# Patient Record
Sex: FEMALE | Race: WHITE | NOT HISPANIC OR LATINO | Employment: UNEMPLOYED | URBAN - NONMETROPOLITAN AREA
[De-identification: names, ages, dates, MRNs, and addresses within clinical notes are randomized per-mention and may not be internally consistent; named-entity substitution may affect disease eponyms.]

---

## 2017-04-10 ENCOUNTER — OFFICE VISIT (OUTPATIENT)
Dept: OTOLARYNGOLOGY | Facility: CLINIC | Age: 37
End: 2017-04-10

## 2017-04-10 VITALS
WEIGHT: 158 LBS | TEMPERATURE: 97.8 F | DIASTOLIC BLOOD PRESSURE: 87 MMHG | HEIGHT: 62 IN | BODY MASS INDEX: 29.08 KG/M2 | SYSTOLIC BLOOD PRESSURE: 135 MMHG | HEART RATE: 78 BPM

## 2017-04-10 DIAGNOSIS — H61.21 IMPACTED CERUMEN OF RIGHT EAR: Primary | ICD-10-CM

## 2017-04-10 DIAGNOSIS — Z85.841 HISTORY OF BRAIN CANCER: ICD-10-CM

## 2017-04-10 PROCEDURE — 99213 OFFICE O/P EST LOW 20 MIN: CPT | Performed by: NURSE PRACTITIONER

## 2017-04-10 PROCEDURE — 69210 REMOVE IMPACTED EAR WAX UNI: CPT | Performed by: NURSE PRACTITIONER

## 2017-04-10 NOTE — PROGRESS NOTES
YOB: 1980  Location: [facility]  Location Address: [address] [place] [address], [facility], [address] [place] [address]  Location Phone: [phone]    Chief Complaint   Patient presents with   • Cerumen Impaction       History of Present Illness  Valarie Gomez is a 37 y.o. female.  Valarie Gomez is here for follow up of ENT complaints. The patient has had problems with cerumen accumulation  The symptoms are localized to the right ear. The patient has had moderate symptoms. The symptoms have been present for the last several months . The symptoms are aggravated by  no identifiable factors . The symptoms are improved by no identifieable factors.  Hx of brain cancer with XRT - related chronic ear problems.  She has recently had an  MRI - with a recurrence of tumor that is in in an inoperable location.  Denies any other complaints.       Past Medical History:   Diagnosis Date   • Brain cancer    • Cerumen impaction    • Depression    • Seizure    • Sensorineural hearing loss    • Subjective tinnitus        Past Surgical History:   Procedure Laterality Date   • BRAIN TUMOR EXCISION     • BREAST EXCISIONAL BIOPSY Left 2006   • BREAST EXCISIONAL BIOPSY Left 2010   • CRANIOTOMY     • TUBAL ABDOMINAL LIGATION           Current Outpatient Prescriptions:   •  Cholecalciferol (VITAMIN D3) 5000 UNITS capsule capsule, Take 5,000 Units by mouth Daily., Disp: , Rfl:   •  ferrous sulfate 325 (65 FE) MG tablet, Take 325 mg by mouth Daily With Breakfast., Disp: , Rfl:   •  folic acid (FOLVITE) 1 MG tablet, Take 1 mg by mouth Daily., Disp: , Rfl:   •  OXcarbazepine (TRILEPTAL) 300 MG tablet, TAKE 1 TABLET BY MOUTH TWICE A DAY, Disp: , Rfl: 3  •  tobramycin (TOBREX) 0.3 % solution ophthalmic solution, Administer 1 drop into the left eye Every 4 (Four) Hours., Disp: 5 mL, Rfl: 0  •  Vortioxetine HBr (TRINTELLIX) 10 MG tablet, Take  by mouth., Disp: , Rfl:     Review of patient's allergies indicates no known allergies.    Family  History   Problem Relation Age of Onset   • Breast cancer Mother    • Breast cancer Maternal Aunt        Social History     Social History   • Marital status:      Spouse name: N/A   • Number of children: N/A   • Years of education: N/A     Occupational History   • Not on file.     Social History Main Topics   • Smoking status: Never Smoker   • Smokeless tobacco: Not on file   • Alcohol use No   • Drug use: Defer   • Sexual activity: Not on file     Other Topics Concern   • Not on file     Social History Narrative       Review of Systems   Constitutional: Negative.    HENT:        See hpi   Eyes: Negative.    Respiratory: Negative.    Cardiovascular: Negative.    Gastrointestinal: Negative.    Endocrine: Negative.    Genitourinary: Negative.    Musculoskeletal: Negative.    Skin: Negative.    Allergic/Immunologic: Negative.    Neurological: Negative.    Hematological: Negative.    Psychiatric/Behavioral: Negative.        Vitals:    04/10/17 1305   BP: 135/87   Pulse: 78   Temp: 97.8 °F (36.6 °C)       Objective     Physical Exam  Physical Exam     CONSTITUTIONAL: well nourished, alert, oriented, in no acute distress     COMMUNICATION AND VOICE: able to communicate normally, normal voice quality    HEAD: normocephalic, no lesions, atraumatic, no tenderness, no masses     FACE: appearance normal, no lesions, no tenderness, no deformities, facial motion symmetric    SALIVARY GLANDS: parotid glands with no tenderness, no swelling, no masses, submandibular glands with normal size, nontender    EYES: ocular motility normal, eyelids normal, orbits normal, no proptosis, conjunctiva normal , pupils equal, round     EARS:  Hearing: response to conversational voice normal bilaterally   External Ears: auricles without lesions  Otoscopic: tympanic membrane appearance normal, no lesions, no perforation, normal mobility, no fluid, right TM with adhered cerumen removed with suction    NOSE:  External Nose: structure normal,  no tenderness on palpation, no nasal discharge, no lesions, no evidence of trauma, nostrils patent         ORAL:  Lips: upper and lower lips without lesion   HYPOPHARYNX:   LARYNX: deferred    LYMPH NODES: no lymphadenopathy    CHEST/RESPIRATORY: respiratory effort normal,  CARDIOVASCULAR:  extremities without cyanosis or edema      NEUROLOGIC/PSYCHIATRIC: oriented to time, place and person, mood normal, affect appropriate, CN II-XII intact grossly    Assessment/Plan   Valarie was seen today for cerumen impaction.    Diagnoses and all orders for this visit:    Impacted cerumen of right ear    History of brain cancer      * Surgery not found *  No orders of the defined types were placed in this encounter.    Return in about 6 months (around 10/10/2017).       Patient Instructions   Cerumen was removed

## 2017-04-10 NOTE — PROGRESS NOTES
Procedure Note    Preprocedure Diagnosis:  Cerumen Impaction    Postprocedure Diagnosis:  Cerumen Impaction      PROCEDURE NOTE    PROCEDURE:cerumen removal .     ANESTHESIA:None     REASON FOR THE PROCEDURE:The patient presented with cerumen impaction .   The patient verbally consented to intervention after a full discussion of risks, benefits, and alternatives. No guarantees were made or implied.    DETAILS OF THE PROCEDURE:The patient was seated upright in the exam room chair. The open head otoscope was used to visualize the ear canal and tympanic membrane. Cerumen was then removed from the right ear/TM using a suction

## 2017-06-27 RX ORDER — ALPRAZOLAM 0.25 MG/1
0.25 TABLET ORAL 2 TIMES DAILY PRN
COMMUNITY
End: 2020-10-13 | Stop reason: ALTCHOICE

## 2017-06-27 RX ORDER — FERROUS SULFATE 325(65) MG
325 TABLET ORAL
COMMUNITY

## 2017-06-27 RX ORDER — FOLIC ACID 1 MG/1
1 TABLET ORAL DAILY
COMMUNITY
End: 2019-03-21

## 2017-06-27 RX ORDER — OXCARBAZEPINE 300 MG/1
300 TABLET, FILM COATED ORAL
COMMUNITY

## 2017-06-28 ENCOUNTER — OFFICE VISIT (OUTPATIENT)
Dept: INTERNAL MEDICINE | Age: 37
End: 2017-06-28
Payer: COMMERCIAL

## 2017-06-28 VITALS
WEIGHT: 162 LBS | OXYGEN SATURATION: 98 % | BODY MASS INDEX: 31.8 KG/M2 | HEART RATE: 80 BPM | HEIGHT: 60 IN | DIASTOLIC BLOOD PRESSURE: 68 MMHG | SYSTOLIC BLOOD PRESSURE: 100 MMHG

## 2017-06-28 DIAGNOSIS — G40.909 SEIZURE DISORDER (HCC): ICD-10-CM

## 2017-06-28 DIAGNOSIS — C71.9 ANAPLASTIC GLIOMA OF BRAIN (HCC): ICD-10-CM

## 2017-06-28 DIAGNOSIS — F41.9 ANXIETY: ICD-10-CM

## 2017-06-28 DIAGNOSIS — F34.1 DYSTHYMIA: ICD-10-CM

## 2017-06-28 PROCEDURE — 99213 OFFICE O/P EST LOW 20 MIN: CPT | Performed by: INTERNAL MEDICINE

## 2017-06-28 ASSESSMENT — ENCOUNTER SYMPTOMS
ABDOMINAL PAIN: 0
VOMITING: 0
RHINORRHEA: 0
NAUSEA: 0
EYE REDNESS: 0
SINUS PRESSURE: 0
DIARRHEA: 0
CONSTIPATION: 0
COUGH: 0
SHORTNESS OF BREATH: 0

## 2017-07-13 ENCOUNTER — TELEPHONE (OUTPATIENT)
Dept: INTERNAL MEDICINE | Age: 37
End: 2017-07-13

## 2017-08-29 ENCOUNTER — OFFICE VISIT (OUTPATIENT)
Dept: INTERNAL MEDICINE | Age: 37
End: 2017-08-29
Payer: COMMERCIAL

## 2017-08-29 VITALS
OXYGEN SATURATION: 96 % | BODY MASS INDEX: 32.22 KG/M2 | DIASTOLIC BLOOD PRESSURE: 68 MMHG | HEART RATE: 80 BPM | SYSTOLIC BLOOD PRESSURE: 104 MMHG | WEIGHT: 165 LBS

## 2017-08-29 DIAGNOSIS — F41.9 ANXIETY: ICD-10-CM

## 2017-08-29 DIAGNOSIS — F34.1 DYSTHYMIA: Primary | ICD-10-CM

## 2017-08-29 DIAGNOSIS — G40.909 SEIZURE DISORDER (HCC): ICD-10-CM

## 2017-08-29 DIAGNOSIS — C71.9 ANAPLASTIC GLIOMA OF BRAIN (HCC): ICD-10-CM

## 2017-08-29 PROCEDURE — 99213 OFFICE O/P EST LOW 20 MIN: CPT | Performed by: INTERNAL MEDICINE

## 2017-09-04 ASSESSMENT — ENCOUNTER SYMPTOMS: SHORTNESS OF BREATH: 0

## 2017-09-11 ENCOUNTER — OFFICE VISIT (OUTPATIENT)
Dept: OTOLARYNGOLOGY | Facility: CLINIC | Age: 37
End: 2017-09-11

## 2017-09-11 VITALS
HEART RATE: 75 BPM | WEIGHT: 166 LBS | SYSTOLIC BLOOD PRESSURE: 118 MMHG | HEIGHT: 60 IN | DIASTOLIC BLOOD PRESSURE: 89 MMHG | BODY MASS INDEX: 32.59 KG/M2 | TEMPERATURE: 97.9 F

## 2017-09-11 DIAGNOSIS — Z85.841 HISTORY OF BRAIN CANCER: ICD-10-CM

## 2017-09-11 DIAGNOSIS — H61.21 IMPACTED CERUMEN OF RIGHT EAR: Primary | ICD-10-CM

## 2017-09-11 PROCEDURE — 99213 OFFICE O/P EST LOW 20 MIN: CPT | Performed by: NURSE PRACTITIONER

## 2017-09-11 RX ORDER — ALPRAZOLAM 0.25 MG/1
TABLET ORAL
COMMUNITY

## 2017-09-11 NOTE — PROGRESS NOTES
YOB: 1980  Location: [facility]  Location Address: [address] [place] [address], [facility] [address], [address] [place] [address]  Location Phone: [phone]    Chief Complaint   Patient presents with   • Cerumen Impaction       History of Present Illness  Valarie Gomez is a 37 y.o. female.  Valarie Gomez is here for follow up of ENT complaints. The patient has had problems with cerumen accumulation  The symptoms are localized to the right ear. The patient has had moderate symptoms. The symptoms have been present for the last several months . The symptoms are aggravated by  no identifiable factors . The symptoms are improved by no identifieable factors.  Hx of brain cancer with XRT - related chronic ear problems.  She has recently had an  MRI - with a recurrence of tumor that is in in an inoperable location however suspect its radiation effects.  Denies any other complaints.  Father passed away two years ago       Past Medical History:   Diagnosis Date   • Brain cancer    • Cerumen impaction    • Depression    • Seizure    • Sensorineural hearing loss    • Subjective tinnitus        Past Surgical History:   Procedure Laterality Date   • BRAIN TUMOR EXCISION     • BREAST EXCISIONAL BIOPSY Left 2006   • BREAST EXCISIONAL BIOPSY Left 2010   • CRANIOTOMY     • TUBAL ABDOMINAL LIGATION           Current Outpatient Prescriptions:   •  ALPRAZolam (XANAX) 0.25 MG tablet, Take  by mouth., Disp: , Rfl:   •  Cholecalciferol (VITAMIN D3) 5000 UNITS capsule capsule, Take 5,000 Units by mouth Daily., Disp: , Rfl:   •  ferrous sulfate 325 (65 FE) MG tablet, Take 325 mg by mouth Daily With Breakfast., Disp: , Rfl:   •  folic acid (FOLVITE) 1 MG tablet, Take 1 mg by mouth Daily., Disp: , Rfl:   •  OXcarbazepine (TRILEPTAL) 300 MG tablet, TAKE 1 TABLET BY MOUTH TWICE A DAY, Disp: , Rfl: 3  •  tobramycin (TOBREX) 0.3 % solution ophthalmic solution, Administer 1 drop into the left eye Every 4 (Four) Hours., Disp: 5 mL, Rfl: 0  •   Vortioxetine HBr (TRINTELLIX) 10 MG tablet, Take  by mouth., Disp: , Rfl:     Review of patient's allergies indicates no known allergies.    Family History   Problem Relation Age of Onset   • Breast cancer Mother    • Breast cancer Maternal Aunt        Social History     Social History   • Marital status:      Spouse name: N/A   • Number of children: N/A   • Years of education: N/A     Occupational History   • Not on file.     Social History Main Topics   • Smoking status: Never Smoker   • Smokeless tobacco: Not on file   • Alcohol use No   • Drug use: Defer   • Sexual activity: Not on file     Other Topics Concern   • Not on file     Social History Narrative       Review of Systems   Constitutional: Negative.    HENT:        See hpi   Eyes: Negative.    Respiratory: Negative.    Cardiovascular: Negative.    Gastrointestinal: Negative.    Endocrine: Negative.    Genitourinary: Negative.    Musculoskeletal: Negative.    Skin: Negative.    Allergic/Immunologic: Negative.    Neurological: Negative.    Hematological: Negative.    Psychiatric/Behavioral: Negative.        Vitals:    09/11/17 1106   BP: 118/89   Pulse: 75   Temp: 97.9 °F (36.6 °C)       Objective     Physical Exam  Physical Exam     CONSTITUTIONAL: well nourished, alert, oriented, in no acute distress     COMMUNICATION AND VOICE: able to communicate normally, normal voice quality    HEAD: normocephalic, no lesions, atraumatic, no tenderness, no masses     FACE: appearance normal, no lesions, no tenderness, no deformities, facial motion symmetric    SALIVARY GLANDS: parotid glands with no tenderness, no swelling, no masses, submandibular glands with normal size, nontender    EYES: ocular motility normal, eyelids normal, orbits normal, no proptosis, conjunctiva normal , pupils equal, round     EARS:  Hearing: response to conversational voice normal bilaterally   External Ears: auricles without lesions  Otoscopic: tympanic membrane appearance normal,  no lesions, no perforation, normal mobility, no fluid, right TM with adhered cerumen removed with suction    NOSE:  External Nose: structure normal, no tenderness on palpation, no nasal discharge, no lesions, no evidence of trauma, nostrils patent       ORAL:  Lips: upper and lower lips without lesion   HYPOPHARYNX:   LARYNX: deferred    LYMPH NODES: no lymphadenopathy    CHEST/RESPIRATORY: respiratory effort normal,  CARDIOVASCULAR:  extremities without cyanosis or edema      NEUROLOGIC/PSYCHIATRIC: oriented to time, place and person, mood normal, affect appropriate, CN II-XII intact grossly    Assessment/Plan   Valarie was seen today for cerumen impaction.    Diagnoses and all orders for this visit:    Impacted cerumen of right ear    History of brain cancer      * Surgery not found *  No orders of the defined types were placed in this encounter.    Return in about 4 months (around 1/11/2018).       Patient Instructions   Cerumen was removed    Call for problems or worsening symptoms

## 2017-10-12 ENCOUNTER — TRANSCRIBE ORDERS (OUTPATIENT)
Dept: ADMINISTRATIVE | Facility: HOSPITAL | Age: 37
End: 2017-10-12

## 2017-10-12 DIAGNOSIS — Z12.39 SCREENING FOR BREAST CANCER: ICD-10-CM

## 2017-10-12 DIAGNOSIS — Z12.31 ENCOUNTER FOR SCREENING MAMMOGRAM FOR MALIGNANT NEOPLASM OF BREAST: Primary | ICD-10-CM

## 2017-10-19 ENCOUNTER — TELEPHONE (OUTPATIENT)
Dept: INTERNAL MEDICINE | Age: 37
End: 2017-10-19

## 2017-10-19 DIAGNOSIS — B37.9 YEAST INFECTION: ICD-10-CM

## 2017-10-19 RX ORDER — FLUCONAZOLE 200 MG/1
TABLET ORAL
Qty: 3 TABLET | Refills: 0 | Status: SHIPPED | OUTPATIENT
Start: 2017-10-19 | End: 2018-03-01 | Stop reason: ALTCHOICE

## 2017-10-19 NOTE — TELEPHONE ENCOUNTER
She has a yeast infection and needs medicine sent to [facility]. She is concerned that she is getting these frequently either after her period or sex.

## 2017-10-19 NOTE — TELEPHONE ENCOUNTER
Diflucan sent to pharmacy  And some nystatin/ triamcinolone cream   Try eating yogurt or taking probiotics- might help prevent

## 2017-11-27 ENCOUNTER — TELEPHONE (OUTPATIENT)
Dept: INTERNAL MEDICINE | Age: 37
End: 2017-11-27

## 2017-11-27 ENCOUNTER — HOSPITAL ENCOUNTER (EMERGENCY)
Age: 37
Discharge: HOME | End: 2017-11-27
Attending: EMERGENCY MEDICINE
Payer: COMMERCIAL

## 2017-11-27 ENCOUNTER — APPOINTMENT (OUTPATIENT)
Dept: CT IMAGING | Age: 37
End: 2017-11-27
Payer: COMMERCIAL

## 2017-11-27 VITALS
DIASTOLIC BLOOD PRESSURE: 87 MMHG | TEMPERATURE: 98.4 F | OXYGEN SATURATION: 98 % | HEART RATE: 81 BPM | SYSTOLIC BLOOD PRESSURE: 119 MMHG | RESPIRATION RATE: 18 BRPM

## 2017-11-27 DIAGNOSIS — R93.0 ABNORMAL HEAD CT: ICD-10-CM

## 2017-11-27 DIAGNOSIS — G45.9 TRANSIENT CEREBRAL ISCHEMIA, UNSPECIFIED TYPE: Primary | ICD-10-CM

## 2017-11-27 LAB
BACTERIA: NEGATIVE /HPF
BASOPHILS ABSOLUTE: 0.1 K/UL (ref 0–0.2)
BASOPHILS RELATIVE PERCENT: 0.5 % (ref 0–1)
BILIRUBIN URINE: NEGATIVE
BLOOD, URINE: ABNORMAL
CLARITY: CLEAR
COLOR: YELLOW
EOSINOPHILS ABSOLUTE: 0.1 K/UL (ref 0–0.6)
EOSINOPHILS RELATIVE PERCENT: 1 % (ref 0–5)
EPITHELIAL CELLS, UA: 4 /HPF (ref 0–5)
GLUCOSE URINE: NEGATIVE MG/DL
HCT VFR BLD CALC: 41.3 % (ref 37–47)
HEMOGLOBIN: 13.3 G/DL (ref 12–16)
HYALINE CASTS: 0 /HPF (ref 0–8)
KETONES, URINE: NEGATIVE MG/DL
LEUKOCYTE ESTERASE, URINE: ABNORMAL
LYMPHOCYTES ABSOLUTE: 2.6 K/UL (ref 1.1–4.5)
LYMPHOCYTES RELATIVE PERCENT: 24.5 % (ref 20–40)
MCH RBC QN AUTO: 29.9 PG (ref 27–31)
MCHC RBC AUTO-ENTMCNC: 32.2 G/DL (ref 33–37)
MCV RBC AUTO: 92.8 FL (ref 81–99)
MONOCYTES ABSOLUTE: 0.4 K/UL (ref 0–0.9)
MONOCYTES RELATIVE PERCENT: 4.1 % (ref 0–10)
NEUTROPHILS ABSOLUTE: 7.5 K/UL (ref 1.5–7.5)
NEUTROPHILS RELATIVE PERCENT: 69.6 % (ref 50–65)
NITRITE, URINE: NEGATIVE
PDW BLD-RTO: 13 % (ref 11.5–14.5)
PH UA: 5.5
PLATELET # BLD: 285 K/UL (ref 130–400)
PMV BLD AUTO: 10.8 FL (ref 9.4–12.3)
PROTEIN UA: NEGATIVE MG/DL
RBC # BLD: 4.45 M/UL (ref 4.2–5.4)
RBC UA: 7 /HPF (ref 0–4)
SPECIFIC GRAVITY UA: 1.02
UROBILINOGEN, URINE: 0.2 E.U./DL
WBC # BLD: 10.8 K/UL (ref 4.8–10.8)
WBC UA: 4 /HPF (ref 0–5)

## 2017-11-27 PROCEDURE — 81001 URINALYSIS AUTO W/SCOPE: CPT

## 2017-11-27 PROCEDURE — 85025 COMPLETE CBC W/AUTO DIFF WBC: CPT

## 2017-11-27 PROCEDURE — 99284 EMERGENCY DEPT VISIT MOD MDM: CPT | Performed by: EMERGENCY MEDICINE

## 2017-11-27 PROCEDURE — 70450 CT HEAD/BRAIN W/O DYE: CPT

## 2017-11-27 PROCEDURE — 99285 EMERGENCY DEPT VISIT HI MDM: CPT

## 2017-11-27 PROCEDURE — 87086 URINE CULTURE/COLONY COUNT: CPT

## 2017-11-27 PROCEDURE — 36415 COLL VENOUS BLD VENIPUNCTURE: CPT

## 2017-11-27 ASSESSMENT — ENCOUNTER SYMPTOMS
BACK PAIN: 0
SHORTNESS OF BREATH: 0
ABDOMINAL PAIN: 0

## 2017-11-27 NOTE — TELEPHONE ENCOUNTER
Her  called and said they were fixing to leave to go to [facility] for some scheduled MRI's and Lila Lynch started having slurred speech and weakness on left side. He is taking her to [facility] ER.

## 2017-11-27 NOTE — ED PROVIDER NOTES
[facility]  eMERGENCY dEPARTMENT eNCOUnter      Pt Name: Carola Gunn  MRN: [id number]  [place] 1980  Date of evaluation: 11/27/2017  Provider: Eliud Lewis MD    CHIEF COMPLAINT       Chief Complaint   Patient presents with    Aphasia     Patient had slurred speech that began while en route to [facility]. Symptoms began at 1430. Left sided weakness, unable to  objects         HISTORY OF PRESENT ILLNESS   (Location/Symptom, Timing/Onset, Context/Setting, Quality, Duration, Modifying Factors, Severity)  Note limiting factors. Carola Gunn is a 44 y.o. female who presents to the emergency department with slurred speech and left arm weakness that lasted about 15 minutes. She was riding in a car with her . They were having a conversation when she couldn't get her words out. The patient can move her jaw correctly either. 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 She is follow with Dr. Davon Lowe at [facility]. The patient is back to normal now. This happened about 30 minutes prior to arrival.     The patient was on her way to [facility] to have scheduled MRI done tomorrow. The patient has had seizures in the past only once or twice. She had an episode similar to this [facility] in 2013 follow-up likely to be may be more a partial seizure. The patient does take Trileptal for these. She doesn't really have seizures often at all. The patient otherwise denies any acute headache she has no weakness she speaking normally she is at her normal baseline at this time. She has frequent MRIs of her brain to follow with the abnormal right temporal lobe of her brain. The patient denies any recent illness or fevers. The history is provided by the patient and the spouse. Nursing Notes were reviewed. REVIEW OF SYSTEMS    (2-9 systems for level 4, 10 or more for level 5)     Review of Systems   Constitutional: Negative for fever. Respiratory: Negative for shortness of breath. Gastrointestinal: Negative for abdominal pain. Genitourinary: Negative for dysuria. Musculoskeletal: Negative for back pain. Neurological: Positive for speech difficulty and weakness. Negative for syncope and headaches. Psychiatric/Behavioral: Negative for confusion. A complete review of systems was performed and is negative except as noted above in the HPI. PAST MEDICAL HISTORY     Past Medical History:   Diagnosis Date    [facility]     Anxiety 6/28/2017    Dysthymia 6/28/2017    [facility] 6/28/2017         SURGICAL HISTORY       Past Surgical History:   Procedure Laterality Date    CRANIOTOMY           CURRENT MEDICATIONS       Discharge Medication List as of 11/27/2017  4:22 PM      CONTINUE these medications which have NOT CHANGED    Details   VORTIoxetine (TRINTELLIX) 10 MG TABS tablet Take 10 mg by mouth daily, Disp-90 tablet, R-3Next time this is due give a 3 month supply if coveredNormal      ALPRAZolam (XANAX) 0.25 MG tablet Take 0.25 mg by mouth 2 times daily as needed for SleepHistorical Med      folic acid (FOLVITE) 1 MG tablet Take 1 mg by mouth dailyHistorical Med      ferrous sulfate 325 (65 Fe) MG tablet Take 325 mg by mouth daily (with breakfast)Historical Med      OXcarbazepine (TRILEPTAL) 300 MG tablet Take 300 mg by mouth 2 times dailyHistorical Med      Cholecalciferol (VITAMIN D3) 5000 units TABS Take by mouth Indications: TAke 1 tab dailyHistorical Med      fluconazole (DIFLUCAN) 200 MG tablet Take 1 po times 1 and repeat weekly if not better, Disp-3 tablet, R-0Normal      nystatin-triamcinolone (MYCOLOG II) 331076-3.1 UNIT/GM-% cream Apply topically 2 times daily prn itching, Disp-30 g, R-0, Normal             ALLERGIES     Review of patient's allergies indicates no known allergies.     FAMILY HISTORY       Family History   Problem Relation Age of Onset    Hypertension Mother    [facility] Esophageal Cancer Father     Breast Cancer Other     Ovarian Cancer Other           SOCIAL HISTORY       Social History     Social History    Marital status:      Spouse name: N/A    Number of children: N/A    Years of education: N/A     Social History Main Topics    Smoking status: Never Smoker    Smokeless tobacco: Never Used    Alcohol use None    Drug use: No    Sexual activity: Not Asked     Other Topics Concern    None     Social History Narrative    None       SCREENINGS   NIH Stroke Scale  NIH Stroke Scale Assessed: Yes  Level of Consciousness (1a. ): Alert  LOC Questions (1b. ): Answers both correctly  LOC Commands (1c. ): Obeys both correctly  Best Gaze (2. ): Normal  Visual (3. ): No visual loss  Facial Palsy (4. ): Normal  Motor Arm, Left (5a. ): No drift  Motor Arm, Right (5b. ): No drift  Motor Leg, Left (6a. ): No drift  Motor Leg, Right (6b. ): No driftGlasgow Coma Scale  Eye Opening: Spontaneous  Best Verbal Response: Oriented  Best Motor Response: Obeys commands  Elmore Coma Scale Score: 15        PHYSICAL EXAM    (up to 7 for level 4, 8 or more for level 5)     ED Triage Vitals [11/27/17 1451]   BP Temp Temp Source Pulse Resp SpO2 Height Weight   119/87 98.4 °F (36.9 °C) Oral 86 16 99 % -- --       Physical Exam   Constitutional: She is oriented to person, place, and time. She appears well-developed and well-nourished. No distress. HENT:   Head: Normocephalic. Prior craniotomy scar   Eyes: EOM are normal. Pupils are equal, round, and reactive to light. Neck: Normal range of motion. Neck supple. Cardiovascular: Normal rate, regular rhythm and normal heart sounds. Pulmonary/Chest: Effort normal and breath sounds normal.   Abdominal: Soft. Bowel sounds are normal. She exhibits no distension. There is no tenderness. Musculoskeletal: Normal range of motion. She exhibits no edema. Neurological: She is alert and oriented to person, place, and time.  No cranial nerve deficit or sensory deficit. She exhibits normal muscle tone. Coordination and gait normal. GCS eye subscore is 4. GCS verbal subscore is 5. GCS motor subscore is 6. Skin: Skin is warm and dry. She is not diaphoretic. Psychiatric: She has a normal mood and affect. Her behavior is normal.   Nursing note and vitals reviewed. DIAGNOSTIC RESULTS     EKG: All EKG's are interpreted by the Emergency Department Physician who either signs or Co-signs this chart in the absence of a cardiologist.      RADIOLOGY:   Non-plain film images such as CT, Ultrasound and MRI are read by the radiologist. Plain radiographic images are visualized and preliminarily interpreted by the emergency physician with the below findings:        Interpretation per the Radiologist below, if available at the time of this note:    CT Head WO Contrast   Final Result   1. Prior craniotomy on the right side with encephalomalacia in the   right temporal fossa that appears stable. 2. Vague small area of low density in the right frontal   periventricular white matter is nonspecific. Acute infarct is in the   differential. Tumor recurrence would be in the differential. Follow-up   MRI without and with contrast is recommended. The full report of this exam was immediately signed and available to   the emergency room. The patient is currently in the emergency room. Signed by Dr Irma Valenzuela on 11/27/2017 4:05 PM            ED BEDSIDE ULTRASOUND:   Performed by ED Physician - none    LABS:  Labs Reviewed   CBC WITH AUTO DIFFERENTIAL - Abnormal; Notable for the following:        Result Value    MCHC 32.2 (*)     Neutrophils % 69.6 (*)     All other components within normal limits    Narrative:     Hemolyzed. Notified Dalila/RN/ER at [phone], ER to collect. URINALYSIS - Abnormal; Notable for the following:     Blood, Urine TRACE (*)     Leukocyte Esterase, Urine SMALL (*)     All other components within normal limits    Narrative:     Hemolyzed.

## 2017-11-29 LAB — URINE CULTURE, ROUTINE: NORMAL

## 2017-12-12 ENCOUNTER — OFFICE VISIT (OUTPATIENT)
Dept: OTOLARYNGOLOGY | Facility: CLINIC | Age: 37
End: 2017-12-12

## 2017-12-12 ENCOUNTER — HOSPITAL ENCOUNTER (OUTPATIENT)
Dept: MAMMOGRAPHY | Facility: HOSPITAL | Age: 37
Discharge: HOME | End: 2017-12-12
Admitting: INTERNAL MEDICINE

## 2017-12-12 VITALS
TEMPERATURE: 97.8 F | DIASTOLIC BLOOD PRESSURE: 85 MMHG | BODY MASS INDEX: 33.23 KG/M2 | HEART RATE: 68 BPM | HEIGHT: 60 IN | SYSTOLIC BLOOD PRESSURE: 123 MMHG | WEIGHT: 169.25 LBS

## 2017-12-12 DIAGNOSIS — H61.21 IMPACTED CERUMEN OF RIGHT EAR: ICD-10-CM

## 2017-12-12 DIAGNOSIS — Z12.31 ENCOUNTER FOR SCREENING MAMMOGRAM FOR MALIGNANT NEOPLASM OF BREAST: ICD-10-CM

## 2017-12-12 PROCEDURE — G0202 SCR MAMMO BI INCL CAD: HCPCS

## 2017-12-12 PROCEDURE — 69210 REMOVE IMPACTED EAR WAX UNI: CPT | Performed by: NURSE PRACTITIONER

## 2017-12-12 PROCEDURE — 77063 BREAST TOMOSYNTHESIS BI: CPT

## 2017-12-12 NOTE — PROGRESS NOTES
Procedure Note    Preprocedure Diagnosis:  Cerumen Impaction    Postprocedure Diagnosis:  Cerumen Impaction      PROCEDURE NOTE    PROCEDURE:cerumen removal .     ANESTHESIA:None     REASON FOR THE PROCEDURE:The patient presented with cerumen impaction .   The patient verbally consented to intervention after a full discussion of risks, benefits, and alternatives. No guarantees were made or implied.    DETAILS OF THE PROCEDURE:The patient was seated upright in the exam room chair. The open head otoscope was used to visualize the ear canal and tympanic membrane. Cerumen was then removed from the right TM/EAC with suction/forceps

## 2017-12-13 ENCOUNTER — APPOINTMENT (OUTPATIENT)
Dept: MAMMOGRAPHY | Facility: HOSPITAL | Age: 37
End: 2017-12-13

## 2018-02-22 DIAGNOSIS — G40.909 SEIZURE DISORDER (HCC): ICD-10-CM

## 2018-02-22 LAB
ALBUMIN SERPL-MCNC: 4.1 G/DL (ref 3.5–5.2)
ALP BLD-CCNC: 89 U/L (ref 35–104)
ALT SERPL-CCNC: 15 U/L (ref 5–33)
ANION GAP SERPL CALCULATED.3IONS-SCNC: 13 MMOL/L (ref 7–19)
AST SERPL-CCNC: 15 U/L (ref 5–32)
BILIRUB SERPL-MCNC: <0.2 MG/DL (ref 0.2–1.2)
BUN BLDV-MCNC: 8 MG/DL (ref 6–20)
CALCIUM SERPL-MCNC: 9.2 MG/DL (ref 8.6–10)
CHLORIDE BLD-SCNC: 96 MMOL/L (ref 98–111)
CHOLESTEROL, TOTAL: 151 MG/DL (ref 160–199)
CO2: 27 MMOL/L (ref 22–29)
CREAT SERPL-MCNC: 0.6 MG/DL (ref 0.5–0.9)
GFR NON-AFRICAN AMERICAN: >60
GLUCOSE BLD-MCNC: 90 MG/DL (ref 74–109)
HCT VFR BLD CALC: 35.3 % (ref 37–47)
HDLC SERPL-MCNC: 55 MG/DL (ref 65–121)
HEMOGLOBIN: 11.5 G/DL (ref 12–16)
LDL CHOLESTEROL CALCULATED: 83 MG/DL
MCH RBC QN AUTO: 30.1 PG (ref 27–31)
MCHC RBC AUTO-ENTMCNC: 32.6 G/DL (ref 33–37)
MCV RBC AUTO: 92.4 FL (ref 81–99)
PDW BLD-RTO: 12.8 % (ref 11.5–14.5)
PLATELET # BLD: 233 K/UL (ref 130–400)
PMV BLD AUTO: 10.8 FL (ref 9.4–12.3)
POTASSIUM SERPL-SCNC: 4.6 MMOL/L (ref 3.5–5)
RBC # BLD: 3.82 M/UL (ref 4.2–5.4)
SODIUM BLD-SCNC: 136 MMOL/L (ref 136–145)
T4 FREE: 0.7 NG/DL (ref 0.9–1.7)
TOTAL PROTEIN: 7.2 G/DL (ref 6.6–8.7)
TRIGL SERPL-MCNC: 66 MG/DL (ref 0–149)
TSH SERPL DL<=0.05 MIU/L-ACNC: 2.65 UIU/ML (ref 0.27–4.2)
WBC # BLD: 6.1 K/UL (ref 4.8–10.8)

## 2018-02-23 ENCOUNTER — TELEPHONE (OUTPATIENT)
Dept: INTERNAL MEDICINE | Age: 38
End: 2018-02-23

## 2018-02-23 DIAGNOSIS — J06.9 UPPER RESPIRATORY TRACT INFECTION, UNSPECIFIED TYPE: ICD-10-CM

## 2018-02-23 RX ORDER — AZITHROMYCIN 250 MG/1
TABLET, FILM COATED ORAL
Qty: 1 PACKET | Refills: 0 | Status: SHIPPED | OUTPATIENT
Start: 2018-02-23 | End: 2018-03-01 | Stop reason: ALTCHOICE

## 2018-02-23 RX ORDER — FLUCONAZOLE 200 MG/1
200 TABLET ORAL DAILY
Qty: 3 TABLET | Refills: 0 | Status: SHIPPED | OUTPATIENT
Start: 2018-02-23 | End: 2018-02-26

## 2018-03-01 ENCOUNTER — OFFICE VISIT (OUTPATIENT)
Dept: INTERNAL MEDICINE | Age: 38
End: 2018-03-01
Payer: COMMERCIAL

## 2018-03-01 ENCOUNTER — HOSPITAL ENCOUNTER (OUTPATIENT)
Age: 38
Setting detail: SPECIMEN
Discharge: HOME | End: 2018-03-01
Payer: COMMERCIAL

## 2018-03-01 VITALS
HEART RATE: 68 BPM | SYSTOLIC BLOOD PRESSURE: 100 MMHG | WEIGHT: 177 LBS | DIASTOLIC BLOOD PRESSURE: 70 MMHG | BODY MASS INDEX: 34.57 KG/M2

## 2018-03-01 DIAGNOSIS — F34.1 DYSTHYMIA: ICD-10-CM

## 2018-03-01 DIAGNOSIS — D64.9 ANEMIA, UNSPECIFIED TYPE: ICD-10-CM

## 2018-03-01 DIAGNOSIS — Z00.00 ANNUAL PHYSICAL EXAM: Primary | ICD-10-CM

## 2018-03-01 DIAGNOSIS — Z12.4 PAP SMEAR FOR CERVICAL CANCER SCREENING: ICD-10-CM

## 2018-03-01 DIAGNOSIS — C71.9 ANAPLASTIC GLIOMA OF BRAIN (HCC): ICD-10-CM

## 2018-03-01 DIAGNOSIS — G40.909 SEIZURE DISORDER (HCC): ICD-10-CM

## 2018-03-01 PROCEDURE — 99395 PREV VISIT EST AGE 18-39: CPT | Performed by: INTERNAL MEDICINE

## 2018-03-01 PROCEDURE — 88142 CYTOPATH C/V THIN LAYER: CPT

## 2018-03-01 PROCEDURE — 87624 HPV HI-RISK TYP POOLED RSLT: CPT

## 2018-03-01 ASSESSMENT — ENCOUNTER SYMPTOMS
COUGH: 0
ABDOMINAL DISTENTION: 0
ABDOMINAL PAIN: 0
BACK PAIN: 0
SINUS PRESSURE: 0
EYE REDNESS: 0
EYE DISCHARGE: 0
SHORTNESS OF BREATH: 0

## 2018-03-01 ASSESSMENT — PATIENT HEALTH QUESTIONNAIRE - PHQ9
1. LITTLE INTEREST OR PLEASURE IN DOING THINGS: 0
2. FEELING DOWN, DEPRESSED OR HOPELESS: 0
SUM OF ALL RESPONSES TO PHQ9 QUESTIONS 1 & 2: 0
SUM OF ALL RESPONSES TO PHQ QUESTIONS 1-9: 0

## 2018-03-01 NOTE — PROGRESS NOTES
by mouth 2 times daily as needed for Sleep      folic acid (FOLVITE) 1 MG tablet Take 1 mg by mouth daily      ferrous sulfate 325 (65 Fe) MG tablet Take 325 mg by mouth daily (with breakfast)      OXcarbazepine (TRILEPTAL) 300 MG tablet Take 600 mg by mouth 2 times daily       Cholecalciferol (VITAMIN D3) 5000 units TABS Take by mouth Indications: TAke 1 tab daily       No current facility-administered medications for this visit. Review of Systems   Constitutional: Positive for fatigue. Negative for chills and fever. HENT: Negative for congestion and sinus pressure. Eyes: Negative for discharge and redness. Respiratory: Negative for cough and shortness of breath. Cardiovascular: Negative for chest pain, palpitations and leg swelling. Gastrointestinal: Negative for abdominal distention and abdominal pain. Genitourinary: Negative for dysuria, frequency and urgency. Musculoskeletal: Negative for arthralgias and back pain. Skin: Negative for rash and wound. Neurological: Negative for dizziness, light-headedness and headaches. Psychiatric/Behavioral: Positive for dysphoric mood. Negative for sleep disturbance. The patient is not nervous/anxious. /70   Pulse 68   Wt 177 lb (80.3 kg)   BMI 34.57 kg/m²     Physical Exam   Constitutional: She is oriented to person, place, and time. She appears well-developed. No distress. HENT:   Right Ear: External ear normal. Tympanic membrane is not injected. Left Ear: External ear normal. Tympanic membrane is not injected. Mouth/Throat: Oropharynx is clear and moist. No oropharyngeal exudate. Eyes: Conjunctivae are normal. No scleral icterus. Neck: Neck supple. Carotid bruit is not present. No thyroid mass and no thyromegaly present. Cardiovascular: Normal rate, regular rhythm, S1 normal and S2 normal.  Exam reveals no S3 and no S4. No murmur heard.   Pulses:       Carotid pulses are 0 on the right side, and 0 on the left mg/dL    Triglycerides 66 0 - 149 mg/dL    HDL 55 (L) 65 - 121 mg/dL    LDL Calculated 83 <100 mg/dL   TSH without Reflex   Result Value Ref Range    TSH 2.650 0.270 - 4.200 uIU/mL   T4, Free   Result Value Ref Range    T4 Free 0.7 (L) 0.9 - 1.7 ng/dL       ASSESSMENT/ PLAN:  1. Annual physical exam  Chart medications and labs reviewed keep up-to-date with routine medical care and follow-up call with any problems or complaints    2. 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 3. Dysthymia  Stable we looked for samples and did not have any hopefully her insurance will cover her current care as she has tried other antidepressants without good results. We also encouraged her to go to counseling and counseling with her and her family would also be helpful. - Comprehensive Metabolic Panel; Future  - TSH without Reflex; Future  - T4, Free; Future    4. Seizure disorder [facility])  Stay off driving until cleared by her neuro-oncologist sounds like he will clear her in 2 more months if seizure-free he recently adjusted her medication continue to follow    5. Anemia, unspecified type  Stable monitor reassess with next visit  - CBC; Future  - Vitamin B12; Future  - Folate; Future  - Ferritin; Future  - Iron; Future    6.  Pap smear for cervical cancer screening    - PAP SMEAR; Future

## 2018-03-13 LAB
HPV SOURCE: NORMAL
HPV, HIGH RISK: NEGATIVE

## 2018-04-11 PROBLEM — J06.9 URI (UPPER RESPIRATORY INFECTION): Status: RESOLVED | Noted: 2018-02-23 | Resolved: 2018-04-11

## 2018-04-11 PROBLEM — Z00.00 ANNUAL PHYSICAL EXAM: Status: RESOLVED | Noted: 2018-03-01 | Resolved: 2018-04-11

## 2018-04-12 ENCOUNTER — OFFICE VISIT (OUTPATIENT)
Dept: OTOLARYNGOLOGY | Facility: CLINIC | Age: 38
End: 2018-04-12

## 2018-04-12 VITALS
SYSTOLIC BLOOD PRESSURE: 114 MMHG | HEART RATE: 84 BPM | DIASTOLIC BLOOD PRESSURE: 81 MMHG | WEIGHT: 182 LBS | HEIGHT: 60 IN | TEMPERATURE: 98.2 F | BODY MASS INDEX: 35.73 KG/M2

## 2018-04-12 DIAGNOSIS — H61.23 BILATERAL IMPACTED CERUMEN: ICD-10-CM

## 2018-04-12 PROBLEM — Z12.39 SCREENING FOR BREAST CANCER: Status: RESOLVED | Noted: 2017-10-12 | Resolved: 2018-04-12

## 2018-04-12 PROCEDURE — 69210 REMOVE IMPACTED EAR WAX UNI: CPT | Performed by: NURSE PRACTITIONER

## 2018-04-12 NOTE — PROGRESS NOTES
Procedure Note    Preprocedure Diagnosis:  Cerumen Impaction    Postprocedure Diagnosis:  Cerumen Impaction      PROCEDURE NOTE    PROCEDURE:Bilateral cerumen removal .     ANESTHESIA:None     REASON FOR THE PROCEDURE:The patient presented with cerumen impaction .   The patient verbally consented to intervention after a full discussion of risks, benefits, and alternatives. No guarantees were made or implied.    DETAILS OF THE PROCEDURE:The patient was seated upright in the exam room chair. The open head otoscope was used to visualize the ear canal and tympanic membrane. Cerumen was then removed from the both ears using a suction, forceps .

## 2018-07-16 ENCOUNTER — OFFICE VISIT (OUTPATIENT)
Dept: OTOLARYNGOLOGY | Facility: CLINIC | Age: 38
End: 2018-07-16

## 2018-07-16 VITALS
DIASTOLIC BLOOD PRESSURE: 88 MMHG | HEART RATE: 84 BPM | WEIGHT: 183 LBS | BODY MASS INDEX: 35.93 KG/M2 | HEIGHT: 60 IN | TEMPERATURE: 98 F | SYSTOLIC BLOOD PRESSURE: 130 MMHG

## 2018-07-16 DIAGNOSIS — Z85.841 HISTORY OF BRAIN CANCER: ICD-10-CM

## 2018-07-16 DIAGNOSIS — L92.9 ABNORMAL GRANULATION: ICD-10-CM

## 2018-07-16 DIAGNOSIS — H61.21 IMPACTED CERUMEN OF RIGHT EAR: Primary | ICD-10-CM

## 2018-07-16 PROCEDURE — 99213 OFFICE O/P EST LOW 20 MIN: CPT | Performed by: NURSE PRACTITIONER

## 2018-07-16 RX ORDER — NEOMYCIN SULFATE, POLYMYXIN B SULFATE, HYDROCORTISONE 3.5; 10000; 1 MG/ML; [USP'U]/ML; MG/ML
3 SOLUTION/ DROPS AURICULAR (OTIC) 2 TIMES DAILY
Qty: 10 ML | Refills: 1 | Status: SHIPPED | OUTPATIENT
Start: 2018-07-16 | End: 2018-10-16

## 2018-07-16 NOTE — PROGRESS NOTES
YOB: 1980  Location: [facility]  Location Address: [address] [place] [address], [facility], [address] [place] [address]  Location Phone: [phone]    Chief Complaint   Patient presents with   • Cerumen Impaction       History of Present Illness  Valarie Gomez is a 38 y.o. female.  Valarie Gomez is here for follow up of ENT complaints. The patient has had problems with cerumen accumulation  The symptoms are localized to the right side. The patient has had moderate symptoms. The symptoms have been present for the last several years The symptoms are aggravated by  no identifiable factors. The symptoms are improved by no identifiable factors.       Past Medical History:   Diagnosis Date   • Brain cancer (CMS/HCC)    • Cerumen impaction    • Depression    • Seizure (CMS/HCC)    • Sensorineural hearing loss    • Subjective tinnitus        Past Surgical History:   Procedure Laterality Date   • BRAIN TUMOR EXCISION     • BREAST EXCISIONAL BIOPSY Left 2006   • BREAST EXCISIONAL BIOPSY Left 2010   • CRANIOTOMY     • TUBAL ABDOMINAL LIGATION         Outpatient Prescriptions Marked as Taking for the 18 encounter (Office Visit) with CAROL Cheek   Medication Sig Dispense Refill   • ALPRAZolam (XANAX) 0.25 MG tablet Take  by mouth.     • aspirin 81 MG tablet Take 81 mg by mouth.     • Cholecalciferol (VITAMIN D3) 5000 UNITS capsule capsule Take 5,000 Units by mouth Daily.     • ferrous sulfate 325 (65 FE) MG tablet Take 325 mg by mouth Daily With Breakfast.     • folic acid (FOLVITE) 1 MG tablet Take 1 mg by mouth Daily.     • OXcarbazepine (TRILEPTAL) 300 MG tablet TAKE 1 TABLET BY MOUTH TWICE A DAY  3   • tobramycin (TOBREX) 0.3 % solution ophthalmic solution Administer 1 drop into the left eye Every 4 (Four) Hours. 5 mL 0   • Vortioxetine HBr (TRINTELLIX) 10 MG tablet Take  by mouth.         Patient has no known allergies.    Family History   Problem Relation Age of Onset   • Breast cancer Mother    • Breast  cancer Maternal Aunt    • No Known Problems Father    • No Known Problems Sister    • No Known Problems Brother    • No Known Problems Daughter    • No Known Problems Son    • No Known Problems Maternal Grandmother    • No Known Problems Paternal Grandmother    • No Known Problems Paternal Aunt    • BRCA 1/2 Neg Hx    • Colon cancer Neg Hx    • Endometrial cancer Neg Hx    • Ovarian cancer Neg Hx        Social History     Social History   • Marital status:      Spouse name: N/A   • Number of children: N/A   • Years of education: N/A     Occupational History   • Not on file.     Social History Main Topics   • Smoking status: Never Smoker   • Smokeless tobacco: Never Used   • Alcohol use No   • Drug use: Unknown   • Sexual activity: Not on file     Other Topics Concern   • Not on file     Social History Narrative   • No narrative on file       Review of Systems   Constitutional: Negative.    HENT:        SEE HPI   Eyes: Negative.    Respiratory: Negative.    Cardiovascular: Negative.    Gastrointestinal: Negative.    Endocrine: Negative.    Genitourinary: Negative.    Musculoskeletal: Negative.    Skin: Negative.    Allergic/Immunologic: Negative.    Neurological: Negative.    Hematological: Negative.    Psychiatric/Behavioral: Negative.        Vitals:    07/16/18 1043   BP: 130/88   Pulse: 84   Temp: 98 °F (36.7 °C)       Body mass index is 35.74 kg/m².    Objective     Physical Exam  CONSTITUTIONAL: well nourished, alert, oriented, in no acute distress     COMMUNICATION AND VOICE: able to communicate normally, normal voice quality    HEAD: normocephalic, no lesions, atraumatic, no tenderness, no masses     FACE: appearance normal, no lesions, no tenderness, no deformities, facial motion symmetric    SALIVARY GLANDS: parotid glands with no tenderness, no swelling, no masses, submandibular glands with normal size, nontender    EYES: ocular motility normal, eyelids normal, orbits normal, no proptosis,  conjunctiva normal , pupils equal, round     EARS:  Hearing: response to conversational voice normal bilaterally   External Ears: auricles without lesions  Otoscopic: right EAC with squamoceruminous debris removed with suction, small 1 mm inferior granulation tissue, left TM/EAC normal.    NOSE:  External Nose: structure normal, no tenderness on palpation, no nasal discharge, no lesions, no evidence of trauma, nostrils patent   Intranasal Exam: nasal mucosa normal, vestibule within normal limits, inferior turbinate normal, nasal septum midline     ORAL:  Lips: upper and lower lips without lesion   Teeth: dentition within normal limits for age   Gums: gingivae healthy   Oral Mucosa: oral mucosa normal, no mucosal lesions   Floor of Mouth: Warthin’s duct patent, mucosa normal  Tongue: lingual mucosa normal without lesions, normal tongue mobility   Palate: soft and hard palates with normal mucosa and structure  Oropharynx: oropharyngeal mucosa normal    NECK: neck appearance normal, no mass,  noted without erythema or tenderness    THYROID: no overt thyromegaly, no tenderness, nodules or mass present on palpation, position midline     LYMPH NODES: no lymphadenopathy    CHEST/RESPIRATORY: respiratory effort normal,     CARDIOVASCULAR:extremities without cyanosis or edema      NEUROLOGIC/PSYCHIATRIC: oriented to time, place and person, mood normal, affect appropriate, CN II-XII intact grossly    Assessment/Plan   Valarie was seen today for cerumen impaction.    Diagnoses and all orders for this visit:    Impacted cerumen of right ear    History of brain cancer    Abnormal granulation    Other orders  -     neomycin-polymyxin-hydrocortisone (CORTISPORIN) 1 % solution otic solution; Administer 3 drops to the right ear 2 (Two) Times a Day.      * Surgery not found *  No orders of the defined types were placed in this encounter.    Return in about 6 months (around 1/16/2019).       Patient Instructions   Call for problems or  worsening symptoms    Cerumen was removed

## 2018-08-23 RX ORDER — FLUCONAZOLE 200 MG/1
200 TABLET ORAL DAILY
Qty: 2 TABLET | Refills: 0 | Status: SHIPPED | OUTPATIENT
Start: 2018-08-23 | End: 2018-08-25

## 2018-08-23 RX ORDER — AZITHROMYCIN 250 MG/1
TABLET, FILM COATED ORAL
Qty: 1 PACKET | Refills: 0 | Status: SHIPPED | OUTPATIENT
Start: 2018-08-23 | End: 2018-08-27

## 2018-08-28 DIAGNOSIS — F34.1 DYSTHYMIA: ICD-10-CM

## 2018-08-28 DIAGNOSIS — D64.9 ANEMIA, UNSPECIFIED TYPE: ICD-10-CM

## 2018-08-28 LAB
ALBUMIN SERPL-MCNC: 4 G/DL (ref 3.5–5.2)
ALP BLD-CCNC: 97 U/L (ref 35–104)
ALT SERPL-CCNC: 35 U/L (ref 5–33)
ANION GAP SERPL CALCULATED.3IONS-SCNC: 16 MMOL/L (ref 7–19)
AST SERPL-CCNC: 21 U/L (ref 5–32)
BILIRUB SERPL-MCNC: <0.2 MG/DL (ref 0.2–1.2)
BUN BLDV-MCNC: 8 MG/DL (ref 6–20)
CALCIUM SERPL-MCNC: 9.4 MG/DL (ref 8.6–10)
CHLORIDE BLD-SCNC: 100 MMOL/L (ref 98–111)
CO2: 24 MMOL/L (ref 22–29)
CREAT SERPL-MCNC: 0.6 MG/DL (ref 0.5–0.9)
FERRITIN: 38 NG/ML (ref 13–150)
FOLATE: 18.4 NG/ML (ref 4.8–37.3)
GFR NON-AFRICAN AMERICAN: >60
GLUCOSE BLD-MCNC: 86 MG/DL (ref 74–109)
HCT VFR BLD CALC: 34.7 % (ref 37–47)
HEMOGLOBIN: 11.1 G/DL (ref 12–16)
IRON: 50 UG/DL (ref 37–145)
MCH RBC QN AUTO: 29.8 PG (ref 27–31)
MCHC RBC AUTO-ENTMCNC: 32 G/DL (ref 33–37)
MCV RBC AUTO: 93 FL (ref 81–99)
PDW BLD-RTO: 13 % (ref 11.5–14.5)
PLATELET # BLD: 294 K/UL (ref 130–400)
PMV BLD AUTO: 10.5 FL (ref 9.4–12.3)
POTASSIUM SERPL-SCNC: 4.5 MMOL/L (ref 3.5–5)
RBC # BLD: 3.73 M/UL (ref 4.2–5.4)
SODIUM BLD-SCNC: 140 MMOL/L (ref 136–145)
T4 FREE: 0.8 NG/DL (ref 0.9–1.7)
TOTAL PROTEIN: 7.4 G/DL (ref 6.6–8.7)
TSH SERPL DL<=0.05 MIU/L-ACNC: 2.38 UIU/ML (ref 0.27–4.2)
VITAMIN B-12: 490 PG/ML (ref 211–946)
WBC # BLD: 6.4 K/UL (ref 4.8–10.8)

## 2018-09-04 ENCOUNTER — TRANSCRIBE ORDERS (OUTPATIENT)
Dept: ADMINISTRATIVE | Facility: HOSPITAL | Age: 38
End: 2018-09-04

## 2018-09-04 ENCOUNTER — OFFICE VISIT (OUTPATIENT)
Dept: INTERNAL MEDICINE | Age: 38
End: 2018-09-04
Payer: COMMERCIAL

## 2018-09-04 VITALS
HEART RATE: 80 BPM | BODY MASS INDEX: 35.93 KG/M2 | HEIGHT: 60 IN | WEIGHT: 183 LBS | SYSTOLIC BLOOD PRESSURE: 112 MMHG | DIASTOLIC BLOOD PRESSURE: 70 MMHG | OXYGEN SATURATION: 98 %

## 2018-09-04 DIAGNOSIS — Z12.31 SCREENING MAMMOGRAM, ENCOUNTER FOR: ICD-10-CM

## 2018-09-04 DIAGNOSIS — G40.909 SEIZURE DISORDER (HCC): ICD-10-CM

## 2018-09-04 DIAGNOSIS — F41.9 ANXIETY: ICD-10-CM

## 2018-09-04 DIAGNOSIS — C71.9 ANAPLASTIC GLIOMA OF BRAIN (HCC): ICD-10-CM

## 2018-09-04 DIAGNOSIS — F34.1 DYSTHYMIA: Primary | ICD-10-CM

## 2018-09-04 DIAGNOSIS — Z12.31 ENCOUNTER FOR SCREENING MAMMOGRAM FOR MALIGNANT NEOPLASM OF BREAST: Primary | ICD-10-CM

## 2018-09-04 DIAGNOSIS — E55.9 VITAMIN D DEFICIENCY: ICD-10-CM

## 2018-09-04 DIAGNOSIS — D64.9 ANEMIA, UNSPECIFIED TYPE: ICD-10-CM

## 2018-09-04 PROCEDURE — 99213 OFFICE O/P EST LOW 20 MIN: CPT | Performed by: INTERNAL MEDICINE

## 2018-09-04 NOTE — PROGRESS NOTES
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 She asked she seems to be better today with anxiety and depression she is stable regarding her history of glioma and follows at [address] [place] and no recent seizure. She is frustrated with her weight she denies chest pressure chest pain dyspnea abdominal pain. Past Medical History:   Diagnosis Date    [facility] brain ([facility]     Anxiety 6/28/2017    Dysthymia 6/28/2017    Seizure disorder ([facility] 6/28/2017       Past Surgical History:   Procedure Laterality Date    CRANIOTOMY         Family History   Problem Relation Age of Onset    Hypertension Mother     Esophageal Cancer Father     Breast Cancer Other     Ovarian Cancer Other        Social History     Social History    Marital status:      Spouse name: N/A    Number of children: N/A    Years of education: N/A     Occupational History    Not on file.      Social History Main Topics    Smoking status: Never Smoker    Smokeless tobacco: Never Used    Alcohol use Not on file    Drug use: No    Sexual activity: Not on file     Other Topics Concern    Not on file     Social History Narrative    No narrative on file       No Known Allergies    Current Outpatient Prescriptions   Medication Sig Dispense Refill    aspirin 81 MG tablet Take 81 mg by mouth daily      ALPRAZolam (XANAX) 0.25 MG tablet Take 0.25 mg by mouth 2 times daily as needed for Sleep      folic acid (FOLVITE) 1 MG tablet Take 1 mg by mouth daily      ferrous sulfate 325 (65 Fe) MG tablet Take 325 mg by mouth daily (with breakfast)      OXcarbazepine (TRILEPTAL) 300 MG tablet Take 600 mg by mouth 2 times daily       Cholecalciferol (VITAMIN D3) 5000 units TABS Take by mouth daily       TRINTELLIX 10 MG TABS tablet TAKE 1 TABLET BY

## 2018-09-05 ENCOUNTER — NURSE ONLY (OUTPATIENT)
Dept: INTERNAL MEDICINE | Age: 38
End: 2018-09-05
Payer: COMMERCIAL

## 2018-09-05 DIAGNOSIS — Z23 NEED FOR VACCINATION: Primary | ICD-10-CM

## 2018-09-05 PROCEDURE — 90715 TDAP VACCINE 7 YRS/> IM: CPT | Performed by: INTERNAL MEDICINE

## 2018-09-05 PROCEDURE — 90471 IMMUNIZATION ADMIN: CPT | Performed by: INTERNAL MEDICINE

## 2018-09-08 DIAGNOSIS — F41.9 ANXIETY: ICD-10-CM

## 2018-09-08 DIAGNOSIS — F34.1 DYSTHYMIA: ICD-10-CM

## 2018-09-10 RX ORDER — VORTIOXETINE 10 MG/1
TABLET, FILM COATED ORAL
Qty: 30 TABLET | Refills: 3 | Status: SHIPPED | OUTPATIENT
Start: 2018-09-10 | End: 2018-12-07 | Stop reason: SDUPTHER

## 2018-10-16 ENCOUNTER — OFFICE VISIT (OUTPATIENT)
Dept: OTOLARYNGOLOGY | Facility: CLINIC | Age: 38
End: 2018-10-16

## 2018-10-16 VITALS
DIASTOLIC BLOOD PRESSURE: 89 MMHG | SYSTOLIC BLOOD PRESSURE: 149 MMHG | BODY MASS INDEX: 36.37 KG/M2 | HEIGHT: 60 IN | HEART RATE: 86 BPM | WEIGHT: 185.25 LBS | TEMPERATURE: 98 F

## 2018-10-16 DIAGNOSIS — H61.23 BILATERAL IMPACTED CERUMEN: ICD-10-CM

## 2018-10-16 PROCEDURE — 69210 REMOVE IMPACTED EAR WAX UNI: CPT | Performed by: NURSE PRACTITIONER

## 2018-10-16 NOTE — PROGRESS NOTES
Procedure Note    Preprocedure Diagnosis:  Cerumen Impaction    Postprocedure Diagnosis:  Cerumen Impaction      PROCEDURE NOTE    PROCEDURE:cerumen removal .     ANESTHESIA:None     REASON FOR THE PROCEDURE:The patient presented with cerumen impaction .   The patient verbally consented to intervention after a full discussion of risks, benefits, and alternatives. No guarantees were made or implied.    DETAILS OF THE PROCEDURE:The patient was seated upright in the exam room chair. The open head otoscope was used to visualize the ear canal and tympanic membrane. Cerumen was then removed from the both ears using a suction, forceps .

## 2018-12-07 DIAGNOSIS — F34.1 DYSTHYMIA: ICD-10-CM

## 2018-12-07 DIAGNOSIS — F41.9 ANXIETY: ICD-10-CM

## 2018-12-07 RX ORDER — VORTIOXETINE 10 MG/1
TABLET, FILM COATED ORAL
Qty: 90 TABLET | Refills: 3 | Status: SHIPPED | OUTPATIENT
Start: 2018-12-07 | End: 2019-05-24 | Stop reason: CLARIF

## 2018-12-13 ENCOUNTER — HOSPITAL ENCOUNTER (OUTPATIENT)
Dept: MAMMOGRAPHY | Facility: HOSPITAL | Age: 38
Discharge: HOME | End: 2018-12-13
Admitting: INTERNAL MEDICINE

## 2018-12-13 DIAGNOSIS — Z12.31 SCREENING MAMMOGRAM, ENCOUNTER FOR: ICD-10-CM

## 2018-12-13 DIAGNOSIS — Z12.31 ENCOUNTER FOR SCREENING MAMMOGRAM FOR MALIGNANT NEOPLASM OF BREAST: ICD-10-CM

## 2018-12-13 PROCEDURE — 77067 SCR MAMMO BI INCL CAD: CPT

## 2018-12-13 PROCEDURE — 77063 BREAST TOMOSYNTHESIS BI: CPT

## 2019-01-16 ENCOUNTER — OFFICE VISIT (OUTPATIENT)
Dept: OTOLARYNGOLOGY | Facility: CLINIC | Age: 39
End: 2019-01-16

## 2019-01-16 VITALS
SYSTOLIC BLOOD PRESSURE: 116 MMHG | TEMPERATURE: 97.9 F | WEIGHT: 184.8 LBS | HEIGHT: 60 IN | DIASTOLIC BLOOD PRESSURE: 74 MMHG | OXYGEN SATURATION: 98 % | HEART RATE: 81 BPM | BODY MASS INDEX: 36.28 KG/M2 | RESPIRATION RATE: 16 BRPM

## 2019-01-16 DIAGNOSIS — H61.21 IMPACTED CERUMEN OF RIGHT EAR: Primary | ICD-10-CM

## 2019-01-16 PROCEDURE — 69210 REMOVE IMPACTED EAR WAX UNI: CPT | Performed by: NURSE PRACTITIONER

## 2019-01-16 NOTE — PROGRESS NOTES
Procedure Note    Preprocedure Diagnosis:  Right Cerumen Impaction    Postprocedure Diagnosis:  Right Cerumen Impaction      PROCEDURE NOTE    PROCEDURE:cerumen removal .     ANESTHESIA:None     REASON FOR THE PROCEDURE:The patient presented with cerumen impaction .   The patient verbally consented to intervention after a full discussion of risks, benefits, and alternatives. No guarantees were made or implied.    DETAILS OF THE PROCEDURE:The patient was seated upright in the exam room chair. The open head otoscope was used to visualize the ear canal and tympanic membrane. Cerumen was then removed from the right ears using a suction.

## 2019-01-17 RX ORDER — FLUCONAZOLE 200 MG/1
200 TABLET ORAL DAILY
Qty: 3 TABLET | Refills: 0 | Status: SHIPPED | OUTPATIENT
Start: 2019-01-17 | End: 2019-01-20

## 2019-03-18 ENCOUNTER — TELEPHONE (OUTPATIENT)
Dept: INTERNAL MEDICINE | Age: 39
End: 2019-03-18

## 2019-03-18 RX ORDER — BUPROPION HYDROCHLORIDE 150 MG/1
150 TABLET ORAL EVERY MORNING
Qty: 30 TABLET | Refills: 3 | Status: SHIPPED | OUTPATIENT
Start: 2019-03-18 | End: 2019-07-15 | Stop reason: SDUPTHER

## 2019-03-21 ENCOUNTER — OFFICE VISIT (OUTPATIENT)
Dept: OTOLARYNGOLOGY | Age: 39
End: 2019-03-21
Payer: COMMERCIAL

## 2019-03-21 VITALS
HEART RATE: 80 BPM | SYSTOLIC BLOOD PRESSURE: 106 MMHG | DIASTOLIC BLOOD PRESSURE: 72 MMHG | WEIGHT: 180 LBS | HEIGHT: 60 IN | RESPIRATION RATE: 18 BRPM | OXYGEN SATURATION: 99 % | TEMPERATURE: 97.5 F | BODY MASS INDEX: 35.34 KG/M2

## 2019-03-21 DIAGNOSIS — H61.23 BILATERAL IMPACTED CERUMEN: Primary | ICD-10-CM

## 2019-03-21 DIAGNOSIS — Z92.3 HX OF RADIATION THERAPY: ICD-10-CM

## 2019-03-21 DIAGNOSIS — H93.11 TINNITUS OF RIGHT EAR: ICD-10-CM

## 2019-03-21 DIAGNOSIS — Z87.898 HISTORY OF BRAIN TUMOR: ICD-10-CM

## 2019-03-21 PROCEDURE — 99203 OFFICE O/P NEW LOW 30 MIN: CPT | Performed by: OTOLARYNGOLOGY

## 2019-04-15 ENCOUNTER — TELEPHONE (OUTPATIENT)
Dept: INTERNAL MEDICINE | Age: 39
End: 2019-04-15

## 2019-04-15 NOTE — TELEPHONE ENCOUNTER
She has a history of seizures and recently had to switch from Trintelix to Wellbutrin. Since she has switched she randomly has spasms in her head. No other symptoms  She had this happen several years ago when she stopped her antidepressent. She is scheduled for an MRI in [place] next month.

## 2019-05-24 ENCOUNTER — TRANSCRIBE ORDERS (OUTPATIENT)
Dept: ADMINISTRATIVE | Facility: HOSPITAL | Age: 39
End: 2019-05-24

## 2019-05-24 ENCOUNTER — HOSPITAL ENCOUNTER (OUTPATIENT)
Age: 39
Setting detail: SPECIMEN
Discharge: HOME | End: 2019-05-24
Payer: COMMERCIAL

## 2019-05-24 ENCOUNTER — OFFICE VISIT (OUTPATIENT)
Dept: INTERNAL MEDICINE | Age: 39
End: 2019-05-24
Payer: COMMERCIAL

## 2019-05-24 VITALS
OXYGEN SATURATION: 99 % | HEART RATE: 80 BPM | HEIGHT: 60 IN | DIASTOLIC BLOOD PRESSURE: 80 MMHG | BODY MASS INDEX: 35.14 KG/M2 | WEIGHT: 179 LBS | SYSTOLIC BLOOD PRESSURE: 120 MMHG

## 2019-05-24 DIAGNOSIS — E55.9 VITAMIN D DEFICIENCY: ICD-10-CM

## 2019-05-24 DIAGNOSIS — C71.9 ANAPLASTIC GLIOMA OF BRAIN (HCC): ICD-10-CM

## 2019-05-24 DIAGNOSIS — F34.1 DYSTHYMIA: ICD-10-CM

## 2019-05-24 DIAGNOSIS — G40.909 SEIZURE DISORDER (HCC): ICD-10-CM

## 2019-05-24 DIAGNOSIS — Z12.31 SCREENING MAMMOGRAM, ENCOUNTER FOR: Primary | ICD-10-CM

## 2019-05-24 DIAGNOSIS — D22.9 ATYPICAL MOLE: ICD-10-CM

## 2019-05-24 DIAGNOSIS — Z01.419 PAP SMEAR, AS PART OF ROUTINE GYNECOLOGICAL EXAMINATION: ICD-10-CM

## 2019-05-24 DIAGNOSIS — Z12.31 SCREENING MAMMOGRAM, ENCOUNTER FOR: ICD-10-CM

## 2019-05-24 DIAGNOSIS — N94.9 ADNEXAL FULLNESS: ICD-10-CM

## 2019-05-24 DIAGNOSIS — Z00.00 ANNUAL PHYSICAL EXAM: Primary | ICD-10-CM

## 2019-05-24 DIAGNOSIS — F41.9 ANXIETY: ICD-10-CM

## 2019-05-24 PROCEDURE — 99396 PREV VISIT EST AGE 40-64: CPT | Performed by: INTERNAL MEDICINE

## 2019-05-24 PROCEDURE — 88142 CYTOPATH C/V THIN LAYER: CPT

## 2019-05-24 RX ORDER — ERGOCALCIFEROL 1.25 MG/1
50000 CAPSULE ORAL WEEKLY
Qty: 12 CAPSULE | Refills: 1 | Status: SHIPPED | OUTPATIENT
Start: 2019-05-24 | End: 2019-10-24 | Stop reason: SDUPTHER

## 2019-05-24 NOTE — PROGRESS NOTES
Chief Complaint   Patient presents with    Annual Exam     pap today    Depression       HPI: Patient is here today for annual physical exam and to follow-up a history of anaplastic glioma of the brain history of seizure disorder at the time of diagnosis history of anxiety and depression she was doing very well with depression with trintelix but it was cost prohibitive. Switched her back to Wellbutrin but it really she does have a history of a seizure disorder this is not her most preferable medication but she has done well with Wellbutrin in the past does not tolerate SSRI medicines well and she feels comfortable remaining on low-dose Wellbutrin for now she denies any seizure she denies any new complaints or depression and anxiety persists with some social stressors but she's managing doing well she denies chest pain dyspnea abdominal pain fevers chills rashes cough or congestion. She sees her neuro-oncologist in the next week at [place] and will have further scans.     Past Medical History:   Diagnosis Date    [facility]plastic [facility]     Anxiety 6/28/2017    Dysthymia 6/28/2017    Seizure disorder ([facility] 6/28/2017       Past Surgical History:   Procedure Laterality Date    CRANIOTOMY         Family History   Problem Relation Age of Onset    Hypertension Mother     Esophageal Cancer Father     Breast Cancer Other     Ovarian Cancer Other        Social History     Socioeconomic History    Marital status:      Spouse name: Not on file    Number of children: Not on file    Years of education: Not on file    Highest education level: Not on file   Occupational History    Not on file   Social Needs    Financial resource strain: Not on file    Food insecurity:     Worry: Not on file     Inability: Not on file    Transportation needs:     Medical: Not on file     Non-medical: Not on file   Tobacco Use    Smoking status: Never Smoker    Smokeless tobacco: Never Used   Substance and Sexual Activity    Alcohol use: Not on file    Drug use: No    Sexual activity: Not on file   Lifestyle    Physical activity:     Days per week: Not on file     Minutes per session: Not on file    Stress: Not on file   Relationships    Social connections:     Talks on phone: Not on file     Gets together: Not on file     Attends Quaker service: Not on file     Active member of club or organization: Not on file     Attends meetings of clubs or organizations: Not on file     Relationship status: Not on file    Intimate partner violence:     Fear of current or ex partner: Not on file     Emotionally abused: Not on file     Physically abused: Not on file     Forced sexual activity: Not on file   Other Topics Concern    Not on file   Social History Narrative    Not on file       No Known Allergies    Current Outpatient Medications   Medication Sig Dispense Refill    vitamin D (ERGOCALCIFEROL) 89152 units CAPS capsule Take 1 capsule by mouth once a week 12 capsule 1    buPROPion (WELLBUTRIN XL) 150 MG extended release tablet Take 1 tablet by mouth every morning 30 tablet 3    aspirin 81 MG tablet Take 81 mg by mouth daily      ALPRAZolam (XANAX) 0.25 MG tablet Take 0.25 mg by mouth 2 times daily as needed for Sleep      ferrous sulfate 325 (65 Fe) MG tablet Take 325 mg by mouth daily (with breakfast)      OXcarbazepine (TRILEPTAL) 300 MG tablet Take 600 mg by mouth 2 times daily       Cholecalciferol (VITAMIN D3) 5000 units TABS Take by mouth daily        No current facility-administered medications for this visit. Review of Systems   Constitutional: Positive for fatigue. Negative for chills and fever. HENT: Negative for congestion and sinus pressure. Eyes: Negative for discharge and redness. Respiratory: Negative for cough and shortness of breath. Cardiovascular: Negative for chest pain, palpitations and leg swelling.    Gastrointestinal: Negative for abdominal distention and abdominal pain.   Genitourinary: Negative for dysuria, frequency and urgency. Musculoskeletal: Negative for arthralgias and back pain. Skin: Negative for rash and wound. Neurological: Negative for dizziness, light-headedness and headaches. Psychiatric/Behavioral: Positive for dysphoric mood. Negative for sleep disturbance. The patient is nervous/anxious. /80 (Site: Left Upper Arm)   Pulse 80   Ht 5' (1.524 m)   Wt 179 lb (81.2 kg)   SpO2 99%   BMI 34.96 kg/m²   BP Readings from Last 7 Encounters:   05/24/19 120/80   03/21/19 106/72   09/04/18 112/70   03/01/18 100/70   11/27/17 119/87   08/29/17 104/68   06/28/17 100/68     Wt Readings from Last 7 Encounters:   05/24/19 179 lb (81.2 kg)   03/21/19 180 lb (81.6 kg)   09/04/18 183 lb (83 kg)   03/01/18 177 lb (80.3 kg)   08/29/17 165 lb (74.8 kg)   06/28/17 162 lb (73.5 kg)     BMI Readings from Last 7 Encounters:   05/24/19 34.96 kg/m²   03/21/19 35.15 kg/m²   09/04/18 35.74 kg/m²   03/01/18 34.57 kg/m²   08/29/17 32.22 kg/m²   06/28/17 31.64 kg/m²     Resp Readings from Last 7 Encounters:   03/21/19 18   11/27/17 18       Physical Exam   Constitutional: She is oriented to person, place, and time. She appears well-developed. No distress. HENT:   Right Ear: External ear normal. Tympanic membrane is not injected. Left Ear: External ear normal. Tympanic membrane is not injected. Mouth/Throat: Oropharynx is clear and moist. No oropharyngeal exudate. Eyes: Conjunctivae are normal. No scleral icterus. Neck: Neck supple. Carotid bruit is not present. No thyroid mass and no thyromegaly present. Cardiovascular: Normal rate, regular rhythm, S1 normal and S2 normal. Exam reveals no S3 and no S4. No murmur heard. Pulmonary/Chest: Effort normal and breath sounds normal. No respiratory distress. She has no wheezes. She has no rales. Abdominal: Soft. Normal appearance and bowel sounds are normal. She exhibits no distension and no mass.  There is no seizure would have to stop Wellbutrin    9. Dysthymia  Mood is doing okay with the current medication but follow closely would not want to increase    10.  Anxiety  Seems better continue current care and follow

## 2019-05-28 ENCOUNTER — TELEPHONE (OUTPATIENT)
Dept: INTERNAL MEDICINE | Age: 39
End: 2019-05-28

## 2019-05-28 DIAGNOSIS — Z01.419 PAP SMEAR, AS PART OF ROUTINE GYNECOLOGICAL EXAMINATION: ICD-10-CM

## 2019-05-28 NOTE — TELEPHONE ENCOUNTER
She called to let you know that she had MRI at [facility] today if you want to look at results and will get ultrasound tomorrow.

## 2019-05-29 NOTE — TELEPHONE ENCOUNTER
Let her know thanks for contacting me I reviewed Dr. Josefa Lucero note and the tests.  We will keep follow-up also and continue to be cautious with Wellbutrin due to slightly higher seizure risk and follow

## 2019-05-30 LAB
HPV TYPE 16: NOT DETECTED
HPV TYPE 18: NOT DETECTED
INTERPRETATION: NORMAL
OTHER HIGH RISK HPV: NOT DETECTED
SOURCE: NORMAL

## 2019-06-03 ASSESSMENT — ENCOUNTER SYMPTOMS
BACK PAIN: 0
ABDOMINAL DISTENTION: 0
EYE REDNESS: 0
EYE DISCHARGE: 0
SINUS PRESSURE: 0
ABDOMINAL PAIN: 0
COUGH: 0
SHORTNESS OF BREATH: 0

## 2019-06-21 ENCOUNTER — OFFICE VISIT (OUTPATIENT)
Dept: OTOLARYNGOLOGY | Age: 39
End: 2019-06-21
Payer: COMMERCIAL

## 2019-06-21 VITALS
SYSTOLIC BLOOD PRESSURE: 118 MMHG | HEART RATE: 64 BPM | HEIGHT: 60 IN | OXYGEN SATURATION: 98 % | TEMPERATURE: 97.9 F | BODY MASS INDEX: 34.96 KG/M2 | RESPIRATION RATE: 18 BRPM | DIASTOLIC BLOOD PRESSURE: 68 MMHG

## 2019-06-21 DIAGNOSIS — H61.21 IMPACTED CERUMEN OF RIGHT EAR: Primary | ICD-10-CM

## 2019-06-21 PROCEDURE — 69210 REMOVE IMPACTED EAR WAX UNI: CPT | Performed by: OTOLARYNGOLOGY

## 2019-06-21 NOTE — PROGRESS NOTES
[facility]  [address] [facility]  [address]  Dept: [phone]  Dept Fax: [phone]  Loc: [phone]     Gorge Paul is a 35 y.o. female who presents today for her medical conditions/complaintsas noted below. Gorge Paul is c/o of Cerumen Impaction (3 momth ear cleaning )      Current Outpatient Medications   Medication Sig Dispense Refill    vitamin D (ERGOCALCIFEROL) 84998 units CAPS capsule Take 1 capsule by mouth once a week 12 capsule 1    buPROPion (WELLBUTRIN XL) 150 MG extended release tablet Take 1 tablet by mouth every morning 30 tablet 3    aspirin 81 MG tablet Take 81 mg by mouth daily      ALPRAZolam (XANAX) 0.25 MG tablet Take 0.25 mg by mouth 2 times daily as needed for Sleep      ferrous sulfate 325 (65 Fe) MG tablet Take 325 mg by mouth daily (with breakfast)      OXcarbazepine (TRILEPTAL) 300 MG tablet Take 600 mg by mouth 2 times daily       Cholecalciferol (VITAMIN D3) 5000 units TABS Take by mouth daily        No current facility-administered medications for this visit. No Known Allergies    Subjective:    Impacted cerumen bilaterally. Patient medical history reviewed. Objective:   Cerumen and false epithelium on TM surface. Physical Exam  /68   Pulse 64   Temp 97.9 °F (36.6 °C)   Resp 18   Ht 5' (1.524 m)   SpO2 98%   BMI 34.96 kg/m²       The patient has impacted cerumen. Impacted cerumen was safely removed from the right ear with appropriate instrumentation viewing through an operating microscope. It was necessary to use the microscope. Cerumen was removed as well as other debris uneventfully with special instruments including a day hook, curette, and ear suction. Assessment:       ICD-10-CM    1. Impacted cerumen of right ear H61.21            Plan:     Impacted cerumen right ear. Cleared. RTO 3 months.       Lizzie Piper am scribing for and in the presence of Dr. Livan Rasmussen June 21, 2019/8:49 AM/Luiza Borrego. Krzysztof Burton MD,  I personally performed the services described in this documentation as scribed by Ranjan Saavedra in my presence and it appears accurate and complete.

## 2019-07-15 RX ORDER — BUPROPION HYDROCHLORIDE 150 MG/1
150 TABLET ORAL EVERY MORNING
Qty: 90 TABLET | Refills: 3 | Status: SHIPPED | OUTPATIENT
Start: 2019-07-15 | End: 2020-06-24

## 2019-07-17 DIAGNOSIS — F34.1 DYSTHYMIA: ICD-10-CM

## 2019-07-17 DIAGNOSIS — F41.9 ANXIETY: ICD-10-CM

## 2019-07-17 RX ORDER — VORTIOXETINE 10 MG/1
TABLET, FILM COATED ORAL
Qty: 90 TABLET | Refills: 1 | Status: SHIPPED | OUTPATIENT
Start: 2019-07-17 | End: 2019-09-24 | Stop reason: CLARIF

## 2019-09-23 ENCOUNTER — PROCEDURE VISIT (OUTPATIENT)
Dept: OTOLARYNGOLOGY | Age: 39
End: 2019-09-23
Payer: COMMERCIAL

## 2019-09-23 ENCOUNTER — OFFICE VISIT (OUTPATIENT)
Dept: OTOLARYNGOLOGY | Age: 39
End: 2019-09-23
Payer: COMMERCIAL

## 2019-09-23 VITALS
DIASTOLIC BLOOD PRESSURE: 83 MMHG | HEART RATE: 82 BPM | BODY MASS INDEX: 32.79 KG/M2 | TEMPERATURE: 98.3 F | HEIGHT: 60 IN | SYSTOLIC BLOOD PRESSURE: 120 MMHG | WEIGHT: 167 LBS

## 2019-09-23 DIAGNOSIS — H61.21 CERUMEN DEBRIS ON TYMPANIC MEMBRANE OF RIGHT EAR: ICD-10-CM

## 2019-09-23 DIAGNOSIS — C71.9 ANAPLASTIC GLIOMA OF BRAIN (HCC): ICD-10-CM

## 2019-09-23 DIAGNOSIS — H72.91 TYMPANIC MEMBRANE PERFORATION, RIGHT: Primary | ICD-10-CM

## 2019-09-23 DIAGNOSIS — H72.91 TYMPANIC MEMBRANE PERFORATION, RIGHT: ICD-10-CM

## 2019-09-23 DIAGNOSIS — H90.71 MIXED CONDUCTIVE AND SENSORINEURAL HEARING LOSS OF RIGHT EAR WITH UNRESTRICTED HEARING OF LEFT EAR: ICD-10-CM

## 2019-09-23 PROCEDURE — 99212 OFFICE O/P EST SF 10 MIN: CPT | Performed by: OTOLARYNGOLOGY

## 2019-09-23 PROCEDURE — 69210 REMOVE IMPACTED EAR WAX UNI: CPT | Performed by: OTOLARYNGOLOGY

## 2019-09-23 PROCEDURE — 92567 TYMPANOMETRY: CPT | Performed by: AUDIOLOGIST

## 2019-09-23 PROCEDURE — 92557 COMPREHENSIVE HEARING TEST: CPT | Performed by: AUDIOLOGIST

## 2019-09-23 SDOH — HEALTH STABILITY: MENTAL HEALTH: HOW OFTEN DO YOU HAVE A DRINK CONTAINING ALCOHOL?: NEVER

## 2019-09-23 NOTE — ASSESSMENT & PLAN NOTE
High-frequency component of hearing loss primarily sensorineural and likely related to prior radiation treatments  Low-frequency component of hearing loss possibly related to perforation.   We will reevaluate in 1 month

## 2019-09-23 NOTE — ASSESSMENT & PLAN NOTE
Treated at [facility] in February 2010  Craniotomy and radiation therapy

## 2019-09-23 NOTE — PROGRESS NOTES
35 y.o.  female presents today with strip recurrent wax impactions. She had formally been a patient of Dr. Brice Cruz and in the past and also been seen by [facility] for ear cleaning.   She feels that her hearing has deteriorated and presents today for follow-up evaluation    Family History   Problem Relation Age of Onset    Hypertension Mother     Esophageal Cancer Father     Breast Cancer Other     Ovarian Cancer Other      Social History     Socioeconomic History    Marital status:      Spouse name: None    Number of children: None    Years of education: None    Highest education level: None   Occupational History    None   Social Needs    Financial resource strain: None    Food insecurity:     Worry: None     Inability: None    Transportation needs:     Medical: None     Non-medical: None   Tobacco Use    Smoking status: Never Smoker    Smokeless tobacco: Never Used   Substance and Sexual Activity    Alcohol use: Never     Frequency: Never    Drug use: No    Sexual activity: None   Lifestyle    Physical activity:     Days per week: None     Minutes per session: None    Stress: None   Relationships    Social connections:     Talks on phone: None     Gets together: None     Attends Jew service: None     Active member of club or organization: None     Attends meetings of clubs or organizations: None     Relationship status: None    Intimate partner violence:     Fear of current or ex partner: None     Emotionally abused: None     Physically abused: None     Forced sexual activity: None   Other Topics Concern    None   Social History Narrative    None     Past Medical History:   Diagnosis Date    Anaplastic glioma of brain ([facility]     Anxiety 6/28/2017    Dysthymia 6/28/2017    Seizure disorder ([facility] 6/28/2017     Past Surgical History:   Procedure Laterality Date    BREAST BIOPSY      benign    CRANIOTOMY           REVIEW OF SYSTEMS:  all other systems reviewed and are negative  General Health: no change in health status since last visit  Ears: ear pain No Bilateral drainage No  Bilateral ear popping/ fullness Yes  Right episodic  Hearing: hearing loss: Yes       Comments:     PHYSICAL EXAM:    /83   Pulse 82   Temp 98.3 °F (36.8 °C)   Ht 5' (1.524 m)   Wt 167 lb (75.8 kg)   BMI 32.61 kg/m²   Body mass index is 32.61 kg/m². General Appearance: well developed  and well nourished  Head/ Face: normocephalic and atraumatic  Vocal Quality: good/ normal and weak  Ears: Right Ear: External: external ears normal Otoscopy Ear Canal: cerumen impaction Otoscopy TM: perforation: central and Small elliptical central perforation and posterior-superior portion of TM below attic region. Involves less than 5% of TM surface Left Ear: External: external ears normal Otoscopy Ear Canal: canal clear Otoscopy TM: TM's normal  Hearing: see audiogram  Neuro: alert and oriented x3 and cranial nerves II- XII grossly intact  Psych/ Mood: cooperative and no depression, anxiety or agitation    Assessment & Plan:    Problem List Items Addressed This Visit        ENT Problems    Tympanic membrane perforation, right     Small perforation and posterior-superior portion of the TM. Elliptical shape involving 5% of TM  Patient unaware of perforation but does feel that her hearing has deteriorated. No recent history of ear drainage pain or discomfort. In reviewing records from Dr. Elvis Cerna no mention was made of perforation during initial visit in March 2019. However audiology evaluation refers to large volumes and tympanometry testing of right ear which was felt to be consistent with perforation. Hearing testing does show a conductive component in the lower frequencies in the right ear. Possibly related to perforation. At this point will have patient return in 1 month for reevaluation.   If perforation persists we will consider placing paper patch and see how she responds            Other Anaplastic glioma of brain [facility])     Treated at [facility] in February 2010  Craniotomy and radiation therapy         Cerumen debris on tympanic membrane of right ear     Structuring wax in right ear canal.  Will clean. Relevant Orders    HI REMOVAL IMPACTED CERUMEN INSTRUMENTATION UNILAT (Completed)    Mixed conductive and sensorineural hearing loss of right ear with unrestricted hearing of left ear     High-frequency component of hearing loss primarily sensorineural and likely related to prior radiation treatments  Low-frequency component of hearing loss possibly related to perforation. We will reevaluate in 1 month               Orders Placed This Encounter   Procedures    HI REMOVAL IMPACTED CERUMEN INSTRUMENTATION UNILAT     Wax impaction cleared from right ear canal under microscopic guidance with aid of suction and forceps       No orders of the defined types were placed in this encounter. Please note that this chart was generated using dragon dictation software. Although every effort was made to ensure the accuracy of this automated transcription, some errors in transcription may have occurred.

## 2019-09-24 ENCOUNTER — OFFICE VISIT (OUTPATIENT)
Dept: INTERNAL MEDICINE | Age: 39
End: 2019-09-24
Payer: COMMERCIAL

## 2019-09-24 VITALS
OXYGEN SATURATION: 98 % | HEIGHT: 61 IN | WEIGHT: 176 LBS | HEART RATE: 74 BPM | BODY MASS INDEX: 33.23 KG/M2 | SYSTOLIC BLOOD PRESSURE: 120 MMHG | DIASTOLIC BLOOD PRESSURE: 80 MMHG

## 2019-09-24 DIAGNOSIS — C71.9 ANAPLASTIC GLIOMA OF BRAIN (HCC): Primary | ICD-10-CM

## 2019-09-24 DIAGNOSIS — F41.9 ANXIETY: ICD-10-CM

## 2019-09-24 DIAGNOSIS — E61.1 IRON DEFICIENCY: ICD-10-CM

## 2019-09-24 DIAGNOSIS — Z23 NEEDS FLU SHOT: ICD-10-CM

## 2019-09-24 DIAGNOSIS — E55.9 VITAMIN D DEFICIENCY: ICD-10-CM

## 2019-09-24 DIAGNOSIS — F34.1 DYSTHYMIA: ICD-10-CM

## 2019-09-24 DIAGNOSIS — G40.909 SEIZURE DISORDER (HCC): ICD-10-CM

## 2019-09-24 LAB
ALBUMIN SERPL-MCNC: 4.4 G/DL (ref 3.5–5.2)
ALP BLD-CCNC: 98 U/L (ref 35–104)
ALT SERPL-CCNC: 15 U/L (ref 5–33)
ANION GAP SERPL CALCULATED.3IONS-SCNC: 11 MMOL/L (ref 7–19)
AST SERPL-CCNC: 16 U/L (ref 5–32)
BILIRUB SERPL-MCNC: <0.2 MG/DL (ref 0.2–1.2)
BUN BLDV-MCNC: 7 MG/DL (ref 6–20)
CALCIUM SERPL-MCNC: 9.6 MG/DL (ref 8.6–10)
CHLORIDE BLD-SCNC: 97 MMOL/L (ref 98–111)
CO2: 25 MMOL/L (ref 22–29)
CREAT SERPL-MCNC: 0.6 MG/DL (ref 0.5–0.9)
FERRITIN: 35.5 NG/ML (ref 13–150)
GFR NON-AFRICAN AMERICAN: >60
GLUCOSE BLD-MCNC: 96 MG/DL (ref 74–109)
HCT VFR BLD CALC: 37 % (ref 37–47)
HEMOGLOBIN: 11.8 G/DL (ref 12–16)
IRON: 183 UG/DL (ref 37–145)
MCH RBC QN AUTO: 29.8 PG (ref 27–31)
MCHC RBC AUTO-ENTMCNC: 31.9 G/DL (ref 33–37)
MCV RBC AUTO: 93.4 FL (ref 81–99)
PDW BLD-RTO: 12.9 % (ref 11.5–14.5)
PLATELET # BLD: 318 K/UL (ref 130–400)
PMV BLD AUTO: 10.8 FL (ref 9.4–12.3)
POTASSIUM SERPL-SCNC: 4.1 MMOL/L (ref 3.5–5)
RBC # BLD: 3.96 M/UL (ref 4.2–5.4)
SODIUM BLD-SCNC: 133 MMOL/L (ref 136–145)
TOTAL PROTEIN: 7.5 G/DL (ref 6.6–8.7)
TSH SERPL DL<=0.05 MIU/L-ACNC: 2.49 UIU/ML (ref 0.27–4.2)
VITAMIN D 25-HYDROXY: 80.3 NG/ML
WBC # BLD: 9.7 K/UL (ref 4.8–10.8)

## 2019-09-24 PROCEDURE — 99213 OFFICE O/P EST LOW 20 MIN: CPT | Performed by: INTERNAL MEDICINE

## 2019-09-24 PROCEDURE — 90471 IMMUNIZATION ADMIN: CPT | Performed by: INTERNAL MEDICINE

## 2019-09-24 PROCEDURE — 90686 IIV4 VACC NO PRSV 0.5 ML IM: CPT | Performed by: INTERNAL MEDICINE

## 2019-09-24 RX ORDER — FOLIC ACID 1 MG/1
1 TABLET ORAL DAILY
COMMUNITY
End: 2020-10-13 | Stop reason: ALTCHOICE

## 2019-10-01 ENCOUNTER — TELEPHONE (OUTPATIENT)
Dept: OTOLARYNGOLOGY | Age: 39
End: 2019-10-01

## 2019-10-03 ENCOUNTER — TELEPHONE (OUTPATIENT)
Dept: OTOLARYNGOLOGY | Age: 39
End: 2019-10-03

## 2019-10-03 RX ORDER — CIPROFLOXACIN AND DEXAMETHASONE 3; 1 MG/ML; MG/ML
SUSPENSION/ DROPS AURICULAR (OTIC)
Qty: 1 BOTTLE | Refills: 1 | Status: SHIPPED | OUTPATIENT
Start: 2019-10-03 | End: 2019-10-03 | Stop reason: SDUPTHER

## 2019-10-03 RX ORDER — CIPROFLOXACIN AND DEXAMETHASONE 3; 1 MG/ML; MG/ML
SUSPENSION/ DROPS AURICULAR (OTIC)
Qty: 1 BOTTLE | Refills: 1 | Status: SHIPPED | OUTPATIENT
Start: 2019-10-03 | End: 2020-05-04 | Stop reason: ALTCHOICE

## 2019-10-07 ASSESSMENT — ENCOUNTER SYMPTOMS
SINUS PRESSURE: 0
EYE DISCHARGE: 0
ABDOMINAL DISTENTION: 0
COUGH: 0
EYE REDNESS: 0
BACK PAIN: 0
SHORTNESS OF BREATH: 0
ABDOMINAL PAIN: 0

## 2019-10-16 ENCOUNTER — OFFICE VISIT (OUTPATIENT)
Dept: INTERNAL MEDICINE | Age: 39
End: 2019-10-16
Payer: COMMERCIAL

## 2019-10-16 ENCOUNTER — TELEPHONE (OUTPATIENT)
Dept: OTOLARYNGOLOGY | Age: 39
End: 2019-10-16

## 2019-10-16 VITALS
TEMPERATURE: 97.4 F | BODY MASS INDEX: 34.36 KG/M2 | SYSTOLIC BLOOD PRESSURE: 130 MMHG | DIASTOLIC BLOOD PRESSURE: 80 MMHG | HEART RATE: 70 BPM | HEIGHT: 60 IN | OXYGEN SATURATION: 96 % | WEIGHT: 175 LBS

## 2019-10-16 DIAGNOSIS — H72.91 TYMPANIC MEMBRANE PERFORATION, RIGHT: Primary | ICD-10-CM

## 2019-10-16 DIAGNOSIS — H90.71 MIXED CONDUCTIVE AND SENSORINEURAL HEARING LOSS OF RIGHT EAR WITH UNRESTRICTED HEARING OF LEFT EAR: ICD-10-CM

## 2019-10-16 PROCEDURE — 99213 OFFICE O/P EST LOW 20 MIN: CPT | Performed by: PHYSICIAN ASSISTANT

## 2019-10-16 ASSESSMENT — ENCOUNTER SYMPTOMS
DIARRHEA: 0
EYE REDNESS: 0
EYE PAIN: 0
RHINORRHEA: 0
PHOTOPHOBIA: 0
ABDOMINAL PAIN: 0
CHEST TIGHTNESS: 0
NAUSEA: 0
SORE THROAT: 0
WHEEZING: 0
VOMITING: 0
SHORTNESS OF BREATH: 0
COUGH: 0
CONSTIPATION: 0
COLOR CHANGE: 0
SINUS PRESSURE: 0

## 2019-10-24 ENCOUNTER — OFFICE VISIT (OUTPATIENT)
Dept: OTOLARYNGOLOGY | Age: 39
End: 2019-10-24
Payer: COMMERCIAL

## 2019-10-24 ENCOUNTER — PROCEDURE VISIT (OUTPATIENT)
Dept: OTOLARYNGOLOGY | Age: 39
End: 2019-10-24
Payer: COMMERCIAL

## 2019-10-24 VITALS
DIASTOLIC BLOOD PRESSURE: 72 MMHG | HEIGHT: 60 IN | TEMPERATURE: 98.6 F | BODY MASS INDEX: 35.14 KG/M2 | WEIGHT: 179 LBS | HEART RATE: 76 BPM | SYSTOLIC BLOOD PRESSURE: 106 MMHG

## 2019-10-24 DIAGNOSIS — E55.9 VITAMIN D DEFICIENCY: ICD-10-CM

## 2019-10-24 DIAGNOSIS — H72.91 TYMPANIC MEMBRANE PERFORATION, RIGHT: ICD-10-CM

## 2019-10-24 DIAGNOSIS — H72.91 TYMPANIC MEMBRANE PERFORATION, RIGHT: Primary | ICD-10-CM

## 2019-10-24 DIAGNOSIS — H90.71 MIXED CONDUCTIVE AND SENSORINEURAL HEARING LOSS OF RIGHT EAR WITH UNRESTRICTED HEARING OF LEFT EAR: ICD-10-CM

## 2019-10-24 PROCEDURE — 99212 OFFICE O/P EST SF 10 MIN: CPT | Performed by: OTOLARYNGOLOGY

## 2019-10-24 PROCEDURE — 92552 PURE TONE AUDIOMETRY AIR: CPT | Performed by: AUDIOLOGIST

## 2019-10-25 RX ORDER — ERGOCALCIFEROL 1.25 MG/1
CAPSULE ORAL
Qty: 12 CAPSULE | Refills: 1 | Status: SHIPPED | OUTPATIENT
Start: 2019-10-25 | End: 2020-03-20

## 2019-11-12 DIAGNOSIS — Z12.31 SCREENING MAMMOGRAM, ENCOUNTER FOR: Primary | ICD-10-CM

## 2019-11-25 ENCOUNTER — TELEPHONE (OUTPATIENT)
Dept: INTERNAL MEDICINE | Age: 39
End: 2019-11-25

## 2019-11-25 RX ORDER — CEFDINIR 300 MG/1
300 CAPSULE ORAL 2 TIMES DAILY
Qty: 14 CAPSULE | Refills: 0 | Status: SHIPPED | OUTPATIENT
Start: 2019-11-25 | End: 2019-12-02

## 2019-12-05 ENCOUNTER — OFFICE VISIT (OUTPATIENT)
Dept: OTOLARYNGOLOGY | Age: 39
End: 2019-12-05
Payer: COMMERCIAL

## 2019-12-05 VITALS
WEIGHT: 172.38 LBS | SYSTOLIC BLOOD PRESSURE: 122 MMHG | TEMPERATURE: 98.2 F | HEIGHT: 60 IN | DIASTOLIC BLOOD PRESSURE: 72 MMHG | BODY MASS INDEX: 33.84 KG/M2

## 2019-12-05 DIAGNOSIS — H72.91 TYMPANIC MEMBRANE PERFORATION, RIGHT: ICD-10-CM

## 2019-12-05 DIAGNOSIS — H90.71 MIXED CONDUCTIVE AND SENSORINEURAL HEARING LOSS OF RIGHT EAR WITH UNRESTRICTED HEARING OF LEFT EAR: ICD-10-CM

## 2019-12-05 DIAGNOSIS — H93.11 TINNITUS, RIGHT: ICD-10-CM

## 2019-12-05 PROCEDURE — 99212 OFFICE O/P EST SF 10 MIN: CPT | Performed by: OTOLARYNGOLOGY

## 2019-12-17 ENCOUNTER — HOSPITAL ENCOUNTER (OUTPATIENT)
Dept: WOMENS IMAGING | Age: 39
Discharge: HOME | End: 2019-12-17
Payer: COMMERCIAL

## 2019-12-17 DIAGNOSIS — Z12.31 SCREENING MAMMOGRAM, ENCOUNTER FOR: ICD-10-CM

## 2019-12-17 PROCEDURE — 77063 BREAST TOMOSYNTHESIS BI: CPT

## 2020-02-10 NOTE — PROGRESS NOTES
Name:  Valarie Gomez  YOB: 1980  Location: [facility]  Location Address: [address] [place] [address], [facility], [address] [place] [address]  Location Phone: [phone]    Chief Complaint  Chief Complaint   Patient presents with   • Skin Lesion     under right eye removed by katie       History of Present Illness  The patient  is a 39 y.o. female who is referred by Maddie Jimenez MD for preoperative evaluation. She complains of a lesion of the right superior medial malar cheek present for >5 year(s). It has been  biopsied.  Pathology demonstrated a basal cell carcinoma.    I have personally reviewed the information imported into the chart during this visit.      I have personally reviewed the review of systems.                         Past Medical History:   Diagnosis Date   • Brain cancer (CMS/HCC)    • Cerumen impaction    • Depression    • Seizure (CMS/HCC)    • Sensorineural hearing loss    • Subjective tinnitus        Past Surgical History:   Procedure Laterality Date   • BRAIN TUMOR EXCISION     • BREAST EXCISIONAL BIOPSY Left    • BREAST EXCISIONAL BIOPSY Left 2010   • CRANIOTOMY     • SKIN LESION EXCISION     • TUBAL ABDOMINAL LIGATION           Current Outpatient Medications:   •  aspirin 81 MG tablet, Take 81 mg by mouth., Disp: , Rfl:   •  buPROPion XL (WELLBUTRIN XL) 150 MG 24 hr tablet, Take 150 mg by mouth Every Morning., Disp: , Rfl:   •  Cholecalciferol (VITAMIN D3) 5000 UNITS capsule capsule, Take 5,000 Units by mouth Daily., Disp: , Rfl:   •  ferrous sulfate 325 (65 FE) MG tablet, Take 325 mg by mouth Daily With Breakfast., Disp: , Rfl:   •  OXcarbazepine (TRILEPTAL) 300 MG tablet, TAKE 1 TABLET BY MOUTH TWICE A DAY, Disp: , Rfl: 3  •  vitamin D (ERGOCALCIFEROL) 1.25 MG (29382 UT) capsule capsule, Take 50,000 Units by mouth Every 7 (Seven) Days.  , Disp: , Rfl:   •  Vortioxetine HBr (TRINTELLIX) 10 MG tablet, Take  by mouth., Disp: , Rfl:   •  ALPRAZolam (XANAX)  0.25 MG tablet, Take  by mouth., Disp: , Rfl:     Patient has no known allergies.    Family History   Problem Relation Age of Onset   • Breast cancer Mother    • Breast cancer Maternal Aunt    • Cancer Father    • No Known Problems Sister    • No Known Problems Brother    • No Known Problems Daughter    • No Known Problems Son    • No Known Problems Maternal Grandmother    • No Known Problems Paternal Grandmother    • Cancer Paternal Aunt    • BRCA 1/2 Neg Hx    • Colon cancer Neg Hx    • Endometrial cancer Neg Hx    • Ovarian cancer Neg Hx        Social History     Socioeconomic History   • Marital status:      Spouse name: Not on file   • Number of children: Not on file   • Years of education: Not on file   • Highest education level: Not on file   Tobacco Use   • Smoking status: Never Smoker   • Smokeless tobacco: Never Used   Substance and Sexual Activity   • Alcohol use: No   • Drug use: Defer       Review of Systems   Constitutional: Negative.    HENT: Negative.    Eyes: Negative.    Respiratory: Negative.    Cardiovascular: Negative.    Gastrointestinal: Negative.    Endocrine: Negative.    Genitourinary: Negative.    Musculoskeletal: Negative.    Skin:        lesion of the right superior medial malar cheek    Allergic/Immunologic: Negative.    Neurological: Negative.    Hematological: Negative.    Psychiatric/Behavioral: Negative.        Vitals:    02/11/20 0923   BP: 127/77   Pulse: 86   Temp: 98.2 °F (36.8 °C)       Objective     Physical Exam  CONSTITUTIONAL: well nourished, well-developed, alert, oriented, in no acute distress     COMMUNICATION AND VOICE: able to communicate normally, normal voice quality    HEAD: normocephalic, no lesions, atraumatic, no tenderness, no masses     FACE: appearance normal, no lesions, no tenderness, no deformities, facial motion symmetric    EYES: ocular motility normal, eyelids normal, orbits normal, no proptosis, conjunctiva normal , pupils equal, round  "    EARS:  Hearing: hearing to conversational voice intact bilaterally   External Ears: normal bilaterally, no lesions    NOSE:  External Nose: external nasal structure normal, no tenderness on palpation, no nasal discharge, 4 mm scar right inferior palpebrum with minimal palpable papular change RPP, no evidence of trauma, nostrils patent     ORAL:  Lips: upper and lower lips without lesion     NECK:  Inspection and Palpation: neck appearance normal, no masses or tenderness    CHEST/RESPIRATORY: normal respiratory effort     CARDIOVASCULAR: no cyanosis or edema     NEUROLOGICAL/PSYCHIATRIC: oriented to time, place and person, mood normal, affect appropriate, CN II-XII intact grossly      Assessment/Plan   Valarie was seen today for skin lesion.    Diagnoses and all orders for this visit:    Basal cell carcinoma of right lower eyelid      * Surgery not found *  No orders of the defined types were placed in this encounter.    Return for postop.       Patient Instructions   The risks, benefits and options of the procedure were explained to the patient. The possiblity of a persistent cosmetic defect as well as a \"flap\" or a graft to close the defect was discussed. The patient was instructed to stop all aspirin, NSAIDs and VIT E etc.  If appropriate, clearance with primary MD or specialist will be obtained preoperatively.  The patient was scheduled for a LOCAL in the office.    For instructions on the proper use, care and disposal of controled substances, ask you doctor or refer to the [place] Board of Medicine website: http://kbml.[place].gov/hb1/Pages/Considerations-For-Patient-Education.aspx            "

## 2020-02-11 ENCOUNTER — OFFICE VISIT (OUTPATIENT)
Dept: OTOLARYNGOLOGY | Facility: CLINIC | Age: 40
End: 2020-02-11

## 2020-02-11 VITALS
HEIGHT: 60 IN | TEMPERATURE: 98.2 F | DIASTOLIC BLOOD PRESSURE: 77 MMHG | BODY MASS INDEX: 34.24 KG/M2 | SYSTOLIC BLOOD PRESSURE: 127 MMHG | WEIGHT: 174.4 LBS | HEART RATE: 86 BPM

## 2020-02-11 DIAGNOSIS — C44.1122 BASAL CELL CARCINOMA OF RIGHT LOWER EYELID: Primary | ICD-10-CM

## 2020-02-11 PROCEDURE — 99203 OFFICE O/P NEW LOW 30 MIN: CPT | Performed by: OTOLARYNGOLOGY

## 2020-02-11 RX ORDER — BUPROPION HYDROCHLORIDE 150 MG/1
150 TABLET ORAL EVERY MORNING
COMMUNITY
Start: 2019-12-30

## 2020-02-11 RX ORDER — ERGOCALCIFEROL 1.25 MG/1
50000 CAPSULE ORAL
COMMUNITY
Start: 2019-12-30

## 2020-02-17 ENCOUNTER — TELEPHONE (OUTPATIENT)
Dept: OTOLARYNGOLOGY | Facility: CLINIC | Age: 40
End: 2020-02-17

## 2020-02-17 NOTE — TELEPHONE ENCOUNTER
PT called asking whether to hold ASA 81mg for IOP on Thursday, per MD's RN, PT was instructed to hold ASA 24 hours before procedure, PT was notified and voiced understanding.

## 2020-02-20 ENCOUNTER — PROCEDURE VISIT (OUTPATIENT)
Dept: OTOLARYNGOLOGY | Facility: CLINIC | Age: 40
End: 2020-02-20

## 2020-02-20 DIAGNOSIS — C44.1122 BASAL CELL CARCINOMA OF RIGHT LOWER EYELID: Primary | ICD-10-CM

## 2020-02-20 PROCEDURE — 12051 INTMD RPR FACE/MM 2.5 CM/<: CPT | Performed by: OTOLARYNGOLOGY

## 2020-02-20 PROCEDURE — 11640 EXC F/E/E/N/L MAL+MRG 0.5CM<: CPT | Performed by: OTOLARYNGOLOGY

## 2020-02-20 NOTE — PROGRESS NOTES
PROCEDURE NOTE    Valarie Gomez    DATE OF PROCEDURE: 02/20/2020    PROCEDURE:   Excision of basal cell carcinoma of the right lower eyelid\cheek with complex closure    PREPROCEDURE DIAGNOSIS:   Basal cell carcinoma                                                                                                                       of the right inferior eyelid\cheek    POSTPROCEDURE DIAGNOSIS:  SAME    ANESTHESIA:   Injected 1% Lidocaine with 1:100,000 parts epinephrine solution - 3 cc    PROCEDURE DESCRIPTION:    The patient was prepped and draped in the usual fashion.  Following the injection of local anesthesia circumferential elliptical excision was accomplished of the lesion of the anterior neck without difficulty utilizing a #15 blade.  Excision was 1.3 x 0.4 cm.  The lesion was 0.3 x 0.2 cm.  The margin was 0.1 mm. Wide undermining was performed with curved iris scissors in order to facilitate closure and preserve normal anatomic relationships..  There was minimal to no bleeding.    Reapproximation was accomplished with interrupted 5-0 nylon.    Bacitracin ointment was applied and the procedure terminated.  The patient tolerated the procedure well. There were no complications.

## 2020-02-20 NOTE — PATIENT INSTRUCTIONS
Call or return for problems  Wound care as instructed; clean 3 times a day with warm soapy water and apply ointment provided  Follow up in 10 days for suture removal

## 2020-02-24 ENCOUNTER — TELEPHONE (OUTPATIENT)
Dept: INTERNAL MEDICINE | Age: 40
End: 2020-02-24

## 2020-02-24 NOTE — TELEPHONE ENCOUNTER
I would cut back on salt and make sure not taking psuedophed----  Eat lots of fruits and vegetable and drink lots of water--- send the DASH diet or can look it up on computer (diet at stopping htn - by [employer])  Just check once or so a day--- if check too much can go up with anxiety-- watch and let juanpablo shaikhw how doing and keep apt 3/24 but let us know how doing with bp in 1-2 weeks

## 2020-02-28 ENCOUNTER — OFFICE VISIT (OUTPATIENT)
Dept: OTOLARYNGOLOGY | Facility: CLINIC | Age: 40
End: 2020-02-28

## 2020-02-28 DIAGNOSIS — C44.1122 BASAL CELL CARCINOMA OF RIGHT LOWER EYELID: Primary | ICD-10-CM

## 2020-02-28 PROCEDURE — 99024 POSTOP FOLLOW-UP VISIT: CPT | Performed by: OTOLARYNGOLOGY

## 2020-02-28 NOTE — PROGRESS NOTES
Procedure   Suture Removal  Date/Time: 2/28/2020 10:33 AM  Performed by: Neris Mitchell RN  Authorized by: Tate Jimenez MD   Consent: Verbal consent obtained.  Patient identity confirmed: verbally with patient  Body area: head/neck  Location details: right cheek  Comments: Patient presents for suture removal. The incision is well-approximated with no redness or edema noted. We will call patient with path           Answers for HPI/ROS submitted by the patient on 2/26/2020   What is the primary reason for your visit?: Other  Please describe your symptoms.: Remove stitches from incision.  Have you had these symptoms before?: No  How long have you been having these symptoms?: Other

## 2020-03-02 ENCOUNTER — TELEPHONE (OUTPATIENT)
Dept: OTOLARYNGOLOGY | Facility: CLINIC | Age: 40
End: 2020-03-02

## 2020-03-12 ENCOUNTER — OFFICE VISIT (OUTPATIENT)
Dept: OTOLARYNGOLOGY | Age: 40
End: 2020-03-12
Payer: COMMERCIAL

## 2020-03-12 ENCOUNTER — PROCEDURE VISIT (OUTPATIENT)
Dept: OTOLARYNGOLOGY | Age: 40
End: 2020-03-12
Payer: COMMERCIAL

## 2020-03-12 VITALS
WEIGHT: 171 LBS | HEIGHT: 60 IN | SYSTOLIC BLOOD PRESSURE: 126 MMHG | DIASTOLIC BLOOD PRESSURE: 82 MMHG | BODY MASS INDEX: 33.57 KG/M2

## 2020-03-12 PROCEDURE — 99212 OFFICE O/P EST SF 10 MIN: CPT | Performed by: OTOLARYNGOLOGY

## 2020-03-12 PROCEDURE — 69210 REMOVE IMPACTED EAR WAX UNI: CPT | Performed by: OTOLARYNGOLOGY

## 2020-03-12 PROCEDURE — 92553 AUDIOMETRY AIR & BONE: CPT | Performed by: AUDIOLOGIST

## 2020-03-12 PROCEDURE — 92567 TYMPANOMETRY: CPT | Performed by: AUDIOLOGIST

## 2020-03-12 RX ORDER — NEOMYCIN SULFATE, POLYMYXIN B SULFATE AND HYDROCORTISONE 10; 3.5; 1 MG/ML; MG/ML; [USP'U]/ML
4 SUSPENSION/ DROPS AURICULAR (OTIC) 4 TIMES DAILY
Qty: 1 BOTTLE | Refills: 3 | Status: SHIPPED | OUTPATIENT
Start: 2020-03-12 | End: 2020-03-26

## 2020-03-12 NOTE — PROGRESS NOTES
Underlying TM appears to be intact       Orders Placed This Encounter   Medications    neomycin-polymyxin-hydrocortisone (CORTISPORIN) 3.5-67647-0 otic suspension     Sig: Place 4 drops into the right ear 4 times daily for 14 days     Dispense:  1 Bottle     Refill:  3             Please note that this chart was generated using dragon dictation software. Although every effort was made to ensure the accuracy of this automated transcription, some errors in transcription may have occurred.

## 2020-03-12 NOTE — PROGRESS NOTES
History   Michelle Melo is a 35 y.o. female who presented to the clinic this date for a recheck of hearing due to history of right TM perforation and tinnitus. She reported continued hissing tinnitus in her right ear. Summary   Tympanometry consistent with normal TM mobility bilaterally. Pure tone testing indicates mild to moderate mixed hearing loss right and hearing WNL left. When compared to previous audiograms obtained in September and October of 2019, low frequency thresholds showed improvement. All other thresholds remained essentially stable bilaterally. Results   Otoscopy:    Right: Clear EAC/Normal TM   Left: Clear EAC/Normal TM    Audiometry:    Right: Mild to moderate sloping mixed hearing loss   Left: Hearing WNL           Tympanometry:     Right: Type A   Left: Type A      Plan   Results of today's testing were discussed with Ms. Adeline Archibald and the following recommendations were made:    1. Follow up with ENT as scheduled. 2. Monitor hearing yearly, sooner with changes. 3. Hearing aid evaluation for right ear as desired. 4. Hearing protection as warranted.         Audiogram and Acoustic Immittance

## 2020-03-17 ENCOUNTER — TELEPHONE (OUTPATIENT)
Dept: INTERNAL MEDICINE | Age: 40
End: 2020-03-17

## 2020-04-24 ENCOUNTER — TELEPHONE (OUTPATIENT)
Dept: INTERNAL MEDICINE | Age: 40
End: 2020-04-24

## 2020-05-04 ENCOUNTER — TELEMEDICINE (OUTPATIENT)
Dept: INTERNAL MEDICINE | Age: 40
End: 2020-05-04
Payer: COMMERCIAL

## 2020-05-04 VITALS — HEIGHT: 61 IN | WEIGHT: 175 LBS | BODY MASS INDEX: 33.04 KG/M2

## 2020-05-04 PROCEDURE — 99213 OFFICE O/P EST LOW 20 MIN: CPT | Performed by: INTERNAL MEDICINE

## 2020-05-04 ASSESSMENT — ENCOUNTER SYMPTOMS
SHORTNESS OF BREATH: 0
COUGH: 0
EYE DISCHARGE: 0
SINUS PRESSURE: 0
BACK PAIN: 0
ABDOMINAL PAIN: 0
ABDOMINAL DISTENTION: 0
EYE REDNESS: 0

## 2020-06-24 RX ORDER — BUPROPION HYDROCHLORIDE 150 MG/1
TABLET ORAL
Qty: 90 TABLET | Refills: 3 | Status: SHIPPED | OUTPATIENT
Start: 2020-06-24 | End: 2021-06-08

## 2020-06-29 NOTE — PROGRESS NOTES
YOB: 1980  Location: [facility]  Location Address: [address] [place] [address], [facility], [address] [place] [address]  Location Phone: [phone]    Chief Complaint   Patient presents with   • Follow-up       History of Present Illness  Valarie Gomez is a 40 y.o. female.  Valarie Goemz is status post Excision of basal cell carcinoma of the right lower eyelid\cheek with complex closure on 20. The surgical site has healed well without complaints.                  Past Medical History:   Diagnosis Date   • Brain cancer (CMS/HCC)    • Cerumen impaction    • Depression    • Seizure (CMS/HCC)    • Sensorineural hearing loss    • Subjective tinnitus        Past Surgical History:   Procedure Laterality Date   • BRAIN TUMOR EXCISION     • BREAST EXCISIONAL BIOPSY Left    • BREAST EXCISIONAL BIOPSY Left    • CRANIOTOMY     • SKIN LESION EXCISION     • TUBAL ABDOMINAL LIGATION         Outpatient Medications Marked as Taking for the 20 encounter (Office Visit) with Tate Jimenez MD   Medication Sig Dispense Refill   • aspirin 81 MG tablet Take 81 mg by mouth.     • buPROPion XL (WELLBUTRIN XL) 150 MG 24 hr tablet Take 150 mg by mouth Every Morning.     • Cholecalciferol (VITAMIN D3) 5000 UNITS capsule capsule Take 5,000 Units by mouth Daily.     • ferrous sulfate 325 (65 FE) MG tablet Take 325 mg by mouth Daily With Breakfast.     • OXcarbazepine (TRILEPTAL) 300 MG tablet TAKE 1 TABLET BY MOUTH TWICE A DAY  3   • vitamin D (ERGOCALCIFEROL) 1.25 MG (52378 UT) capsule capsule Take 50,000 Units by mouth Every 7 (Seven) Days.           Patient has no known allergies.    Family History   Problem Relation Age of Onset   • Breast cancer Mother    • Breast cancer Maternal Aunt    • Cancer Father    • No Known Problems Sister    • No Known Problems Brother    • No Known Problems Daughter    • No Known Problems Son    • No Known Problems Maternal Grandmother    • No Known Problems Paternal Grandmother    •  Cancer Paternal Aunt    • BRCA 1/2 Neg Hx    • Colon cancer Neg Hx    • Endometrial cancer Neg Hx    • Ovarian cancer Neg Hx        Social History     Socioeconomic History   • Marital status:      Spouse name: Not on file   • Number of children: Not on file   • Years of education: Not on file   • Highest education level: Not on file   Tobacco Use   • Smoking status: Never Smoker   • Smokeless tobacco: Never Used   Substance and Sexual Activity   • Alcohol use: No   • Drug use: Defer       Review of Systems   Constitutional: Negative for activity change, appetite change, chills, diaphoresis, fatigue, fever and unexpected weight change.   HENT: Negative for congestion, dental problem, drooling, ear discharge, ear pain, facial swelling, hearing loss, mouth sores, nosebleeds, postnasal drip, rhinorrhea, sinus pressure, sneezing, sore throat, tinnitus, trouble swallowing and voice change.    Eyes: Negative.    Respiratory: Negative.    Cardiovascular: Negative.    Gastrointestinal: Negative.    Endocrine: Negative.    Skin:        S/p removal of a basal cell carcinoma of the right lower eyelid   Allergic/Immunologic: Negative for environmental allergies, food allergies and immunocompromised state.   Neurological: Negative.    Hematological: Negative.    Psychiatric/Behavioral: Negative.        Vitals:    06/30/20 0910   BP: 127/87   Pulse: 89   Temp: 97 °F (36.1 °C)       Body mass index is 34.1 kg/m².    Objective     Physical Exam  Physical Exam  CONSTITUTIONAL: well nourished, alert, oriented, in no acute distress      COMMUNICATION AND VOICE: able to communicate normally, normal voice quality     HEAD: normocephalic, no lesions, atraumatic, no tenderness, no masses      FACE: appearance normal, right lower eyelid surgical site well healed without evidence of a recurrent lesion, no tenderness, no deformities, facial motion symmetric     EYES: ocular motility normal, eyelids normal, orbits normal, no proptosis,  conjunctiva normal , pupils equal, round      EARS:  Hearing: response to conversational voice normal bilaterally   External Ears: auricles without lesions     NOSE:  External Nose: structure normal, no tenderness on palpation, no nasal discharge, no lesions, no evidence of trauma, nostrils patent      ORAL:  Lips: upper and lower lips without lesion      NECK: neck appearance normal     CHEST/RESPIRATORY: respiratory effort normaL     CARDIOVASCULAR: extremities without cyanosis or edema       NEUROLOGIC/PSYCHIATRIC: oriented to time, place and person, mood normal, affect appropriate, CN II-XII intact grossly    Assessment/Plan   Problems Addressed this Visit        Other    H/O basal cell carcinoma excision - Primary        * Surgery not found *  No orders of the defined types were placed in this encounter.    Return if symptoms worsen or fail to improve.       Patient Instructions   Protect the incisions from sunlight. Sunlight to the incisions will cause permanent pigmentation to the incision line and make the incision more noticeable. After the incision has reepithelialized (typically 2-3 weeks after the procedure), you may begin to use sunscreen with an SPF of 15 or greater    For the first week after your procedure, apply a thin coat of Vaseline to the incision 3-4 times daily.  Starting the second week, use a silicone-based wound cream to the incisions to optimize the end result. Apply topically twice daily, or as directed, to help optimize wound healing and decrease redness.    It is very important to continue routine skin checks with a dermatologist or your PCP every 6-12 months.

## 2020-06-30 ENCOUNTER — OFFICE VISIT (OUTPATIENT)
Dept: OTOLARYNGOLOGY | Facility: CLINIC | Age: 40
End: 2020-06-30

## 2020-06-30 VITALS
WEIGHT: 174.6 LBS | HEART RATE: 89 BPM | BODY MASS INDEX: 34.28 KG/M2 | DIASTOLIC BLOOD PRESSURE: 87 MMHG | HEIGHT: 60 IN | SYSTOLIC BLOOD PRESSURE: 127 MMHG | TEMPERATURE: 97 F

## 2020-06-30 DIAGNOSIS — Z98.890 H/O BASAL CELL CARCINOMA EXCISION: Primary | ICD-10-CM

## 2020-06-30 DIAGNOSIS — Z85.828 H/O BASAL CELL CARCINOMA EXCISION: Primary | ICD-10-CM

## 2020-06-30 PROCEDURE — 99213 OFFICE O/P EST LOW 20 MIN: CPT | Performed by: PHYSICIAN ASSISTANT

## 2020-06-30 NOTE — PATIENT INSTRUCTIONS
Protect the incisions from sunlight. Sunlight to the incisions will cause permanent pigmentation to the incision line and make the incision more noticeable. After the incision has reepithelialized (typically 2-3 weeks after the procedure), you may begin to use sunscreen with an SPF of 15 or greater    For the first week after your procedure, apply a thin coat of Vaseline to the incision 3-4 times daily.  Starting the second week, use a silicone-based wound cream to the incisions to optimize the end result. Apply topically twice daily, or as directed, to help optimize wound healing and decrease redness.    It is very important to continue routine skin checks with a dermatologist or your PCP every 6-12 months.

## 2020-07-13 ENCOUNTER — OFFICE VISIT (OUTPATIENT)
Dept: OTOLARYNGOLOGY | Age: 40
End: 2020-07-13
Payer: COMMERCIAL

## 2020-07-13 VITALS
HEIGHT: 60 IN | SYSTOLIC BLOOD PRESSURE: 124 MMHG | WEIGHT: 175 LBS | DIASTOLIC BLOOD PRESSURE: 80 MMHG | BODY MASS INDEX: 34.36 KG/M2

## 2020-07-13 PROCEDURE — 69210 REMOVE IMPACTED EAR WAX UNI: CPT | Performed by: OTOLARYNGOLOGY

## 2020-07-13 NOTE — PROGRESS NOTES
38 y.o.  female presents today for routine follow-up and ear exam.  No new ear related problems are reported.     Family History   Problem Relation Age of Onset    Hypertension Mother     Esophageal Cancer Father     Breast Cancer Other     Ovarian Cancer Other      Social History     Socioeconomic History    Marital status:      Spouse name: None    Number of children: None    Years of education: None    Highest education level: None   Occupational History    None   Social Needs    Financial resource strain: None    Food insecurity     Worry: None     Inability: None    Transportation needs     Medical: None     Non-medical: None   Tobacco Use    Smoking status: Never Smoker    Smokeless tobacco: Never Used   Substance and Sexual Activity    Alcohol use: Never     Frequency: Never    Drug use: No    Sexual activity: None   Lifestyle    Physical activity     Days per week: None     Minutes per session: None    Stress: None   Relationships    Social connections     Talks on phone: None     Gets together: None     Attends Restorationist service: None     Active member of club or organization: None     Attends meetings of clubs or organizations: None     Relationship status: None    Intimate partner violence     Fear of current or ex partner: None     Emotionally abused: None     Physically abused: None     Forced sexual activity: None   Other Topics Concern    None   Social History Narrative    None     Past Medical History:   Diagnosis Date    [facility]plastic [facility] [address]     Anxiety 6/28/2017    Basal cell carcinoma     Dysthymia 6/28/2017    Seizure disorder ([facility] [address] [facility] 6/28/2017     Past Surgical History:   Procedure Laterality Date    BREAST BIOPSY      benign    CRANIOTOMY      SKIN CANCER EXCISION           REVIEW OF SYSTEMS:  all other systems reviewed and are negative  General Health: no change in health status since last visit  Ears: ear pain No Right recent drainage No Right       Comments:     PHYSICAL EXAM:    /80   Ht 5' (1.524 m)   Wt 175 lb (79.4 kg)   BMI 34.18 kg/m²   Body mass index is 34.18 kg/m². General Appearance: well developed , well nourished and no distress  Head/ Face: normocephalic and atraumatic  Vocal Quality: good/ normal  Ears: Right Ear: External: external ears normal Otoscopy Ear Canal: cerumen impaction Otoscopy TM: TM's dull Left Ear: External: external ears normal Otoscopy Ear Canal: canal clear Otoscopy TM: TM's normal  Hearing: grossly intact  Neck: negative and supple  Thyroid: normal  Neuro: alert and oriented x3 and cranial nerves II- XII grossly intact  Psych/ Mood: cooperative and no depression, anxiety or agitation    Assessment & Plan:    Problem List Items Addressed This Visit        ENT Problems    Cerumen debris on tympanic membrane of right ear     Right-sided wax accumulation. Will clean. Relevant Orders    [place] REMOVAL IMPACTED CERUMEN INSTRUMENTATION UNILAT (Completed)          Orders Placed This Encounter   Procedures    [place] REMOVAL IMPACTED CERUMEN INSTRUMENTATION UNILAT     Under microscopic guidance impacted wax cleared from right ear canal with curette and suction       No orders of the defined types were placed in this encounter. Please note that this chart was generated using dragon dictation software. Although every effort was made to ensure the accuracy of this automated transcription, some errors in transcription may have occurred.

## 2020-08-06 ENCOUNTER — TELEPHONE (OUTPATIENT)
Dept: INTERNAL MEDICINE | Age: 40
End: 2020-08-06

## 2020-08-06 RX ORDER — SULFAMETHOXAZOLE AND TRIMETHOPRIM 800; 160 MG/1; MG/1
1 TABLET ORAL 2 TIMES DAILY
Qty: 10 TABLET | Refills: 0 | Status: SHIPPED | OUTPATIENT
Start: 2020-08-06 | End: 2020-08-11

## 2020-08-25 ENCOUNTER — NURSE TRIAGE (OUTPATIENT)
Dept: OTHER | Facility: CLINIC | Age: 40
End: 2020-08-25

## 2020-08-25 NOTE — TELEPHONE ENCOUNTER
Rt leg injury following fall down 3 stairs. Bruising and pain noted from calf down to ankle. Occurred on 8/18/2020. Able to weight bear. No swelling present. Pain of 3 currently, does get worse. Worse with standing and walking. Generally better when elevated. Does hurt some times at night. Reason for Disposition   Injury interferes with work or school    Protocols used: LEG INJURY-ADULT-[place]    Received call from [address]. Pt agrees with discharge disposition. Care advice given    Call soft transferred to Marguerite Madrid at [address] to schedule appointment. Please do not reply to the triage nurse through this encounter. Any subsequent communication should be directly with the patient. 2

## 2020-08-26 ENCOUNTER — OFFICE VISIT (OUTPATIENT)
Dept: INTERNAL MEDICINE | Age: 40
End: 2020-08-26
Payer: COMMERCIAL

## 2020-08-26 VITALS
HEART RATE: 89 BPM | SYSTOLIC BLOOD PRESSURE: 124 MMHG | BODY MASS INDEX: 34.36 KG/M2 | WEIGHT: 175 LBS | HEIGHT: 60 IN | DIASTOLIC BLOOD PRESSURE: 88 MMHG

## 2020-08-26 PROCEDURE — 99213 OFFICE O/P EST LOW 20 MIN: CPT | Performed by: NURSE PRACTITIONER

## 2020-08-26 ASSESSMENT — ENCOUNTER SYMPTOMS
COLOR CHANGE: 0
VOMITING: 0
DIARRHEA: 0
CONSTIPATION: 0
TROUBLE SWALLOWING: 0
WHEEZING: 0
COUGH: 0
EYE ITCHING: 0
STRIDOR: 0
EYE DISCHARGE: 0
ABDOMINAL DISTENTION: 0
CHOKING: 0
SORE THROAT: 0
BLOOD IN STOOL: 0
ABDOMINAL PAIN: 0
NAUSEA: 0
SHORTNESS OF BREATH: 0

## 2020-08-26 NOTE — PROGRESS NOTES
[address] [facility]  [address] [address] [address] [place] [address]  Dept: [phone]  Dept Fax: [phone]  Loc: [phone]    Ning Sloan is a 38 y.o. female who presents today for her medical conditions/complaints as noted below. Ning Sloan is c/gina Leg Pain (Fall x 1 week ago and still having pain right lower leg. Are is still also bruised.)        HPI:     HPI   1. Fell kimberlyn up some steps and fell ; She twisted left foot and her right leg hit the stairs; This was a week ago; she has continued to have pain right anterior leg s; She has not used meds ice or heat   She has been on baby ASA dialy ; Her neurologist started her on that over a year ago    Chief Complaint   Patient presents with    Leg Pain     Fall x 1 week ago and still having pain right lower leg. Are is still also bruised.        Past Medical History:   Diagnosis Date    Anaplastic [facility] ([facility] [address]     Anxiety 6/28/2017    Basal cell carcinoma     Dysthymia 6/28/2017    Seizure disorder ([facility] [address] 6/28/2017      Past Surgical History:   Procedure Laterality Date    BREAST BIOPSY      benign    CRANIOTOMY      SKIN CANCER EXCISION         Vitals 8/26/2020 7/13/2020 5/4/2020 3/12/2020 12/5/2019 64/49/8707   SYSTOLIC 162 095 - 512 568 870   DIASTOLIC 88 80 - 82 72 72   Site - - - - - -   Position - - - - - -   Pulse 89 - - - - 76   Temp - - - - 98.2 98.6   Resp - - - - - -   SpO2 - - - - - -   Weight 175 lb 175 lb 175 lb 171 lb 172 lb 6 oz 179 lb   Height 5' 0\" 5' 0\" 5' 1\" 5' 0\" 5' 0\" 5' 0\"   BMI (wt*703/ht~2) 34.17 kg/m2 34.17 kg/m2 33.06 kg/m2 33.39 kg/m2 33.66 kg/m2 34.95 kg/m2       Family History   Problem Relation Age of Onset    Hypertension Mother     Esophageal Cancer Father     Breast Cancer Other     Ovarian Cancer Other        Social History     Tobacco Use    Smoking status: Never Smoker    Smokeless tobacco: Never Used   Substance Use Topics    Alcohol use: Never     Frequency: Never      Current Outpatient Medications   Medication Sig Dispense Refill    buPROPion (WELLBUTRIN XL) 150 MG extended release tablet TAKE 1 TABLET BY MOUTH EVERY DAY IN THE MORNING 90 tablet 3    vitamin D (ERGOCALCIFEROL) 1.25 MG (43486 UT) CAPS capsule TAKE ONE CAPSULE BY MOUTH ONE TIME PER WEEK 12 capsule 3    aspirin 81 MG tablet Take 81 mg by mouth daily      ferrous sulfate 325 (65 Fe) MG tablet Take 325 mg by mouth daily (with breakfast)      OXcarbazepine (TRILEPTAL) 300 MG tablet Take 600 mg by mouth 4 times daily       folic acid (FOLVITE) 1 MG tablet Take 1 mg by mouth daily      ALPRAZolam (XANAX) 0.25 MG tablet Take 0.25 mg by mouth 2 times daily as needed for Sleep       No current facility-administered medications for this visit.       No Known Allergies    Health Maintenance   Topic Date Due    HIV screen  03/27/1994    Flu vaccine (1) 09/01/2020    Breast cancer screen  12/17/2021    Lipid screen  02/28/2024    Cervical cancer screen  05/28/2024    DTaP/Tdap/Td vaccine (2 - Td) 09/05/2028    Hepatitis A vaccine  Aged Out    Hepatitis B vaccine  Aged Out    Hib vaccine  Aged Out    Meningococcal (ACWY) vaccine  Aged Out    Pneumococcal 0-64 years Vaccine  Aged Out       No results found for: LABA1C  No results found for: PSA, PSADIA  TSH   Date Value Ref Range Status   09/24/2019 2.490 0.270 - 4.200 uIU/mL Final   ]  Lab Results   Component Value Date     (L) 09/24/2019    K 4.1 09/24/2019    CL 97 (L) 09/24/2019    CO2 25 09/24/2019    BUN 7 09/24/2019    CREATININE 0.6 09/24/2019    GLUCOSE 96 09/24/2019    CALCIUM 9.6 09/24/2019    PROT 7.5 09/24/2019    LABALBU 4.4 09/24/2019    BILITOT <0.2 09/24/2019    ALKPHOS 98 09/24/2019    AST 16 09/24/2019    ALT 15 09/24/2019    LABGLOM >60 09/24/2019     Lab Results   Component Value Date    CHOL 150 (L) 02/28/2019    CHOL 151 (L) 02/22/2018     Lab Results   Component Value Date    TRIG 76 02/28/2019 TRIG 66 02/22/2018     Lab Results   Component Value Date    HDL 47 (L) 02/28/2019    HDL 55 (L) 02/22/2018     Lab Results   Component Value Date    LDLCALC 88 02/28/2019    LDLCALC 83 02/22/2018     Lab Results   Component Value Date     09/24/2019    K 4.1 09/24/2019    CL 97 09/24/2019    CO2 25 09/24/2019    BUN 7 09/24/2019    CREATININE 0.6 09/24/2019    GLUCOSE 96 09/24/2019    CALCIUM 9.6 09/24/2019      Lab Results   Component Value Date    WBC 9.7 09/24/2019    HGB 11.8 (L) 09/24/2019    HCT 37.0 09/24/2019    MCV 93.4 09/24/2019     09/24/2019    LABLYMP 1.81 06/30/2012    LYMPHOPCT 24.5 11/27/2017    RBC 3.96 (L) 09/24/2019    MCH 29.8 09/24/2019    MCHC 31.9 (L) 09/24/2019    RDW 12.9 09/24/2019     Lab Results   Component Value Date    VITD25 80.3 09/24/2019       Subjective:      Review of Systems   Constitutional: Negative for fatigue, fever and unexpected weight change. HENT: Negative for ear discharge, ear pain, mouth sores, sore throat and trouble swallowing. Eyes: Negative for discharge, itching and visual disturbance. Respiratory: Negative for cough, choking, shortness of breath, wheezing and stridor. Cardiovascular: Negative for chest pain, palpitations and leg swelling. Gastrointestinal: Negative for abdominal distention, abdominal pain, blood in stool, constipation, diarrhea, nausea and vomiting. Endocrine: Negative for cold intolerance, polydipsia and polyuria. Genitourinary: Negative for difficulty urinating, dysuria, frequency and urgency. Musculoskeletal: Negative for arthralgias and gait problem. Right leg pain   Skin: Negative for color change and rash. Allergic/Immunologic: Negative for food allergies and immunocompromised state. Neurological: Negative for dizziness, tremors, syncope, speech difficulty, weakness and headaches. Hematological: Negative for adenopathy. Does not bruise/bleed easily.    Psychiatric/Behavioral: Negative for confusion and hallucinations. Objective:     Physical Exam  Constitutional:       General: She is not in acute distress. Appearance: She is well-developed. HENT:      Head: Normocephalic and atraumatic. Eyes:      General: No scleral icterus. Right eye: No discharge. Left eye: No discharge. Pupils: Pupils are equal, round, and reactive to light. Neck:      Musculoskeletal: Normal range of motion and neck supple. Thyroid: No thyromegaly. Vascular: No JVD. Cardiovascular:      Rate and Rhythm: Normal rate and regular rhythm. Heart sounds: Normal heart sounds. No murmur. Pulmonary:      Effort: Pulmonary effort is normal. No respiratory distress. Breath sounds: Normal breath sounds. No wheezing or rales. Abdominal:      General: Bowel sounds are normal. There is no distension. Palpations: Abdomen is soft. There is no mass. Tenderness: There is no abdominal tenderness. There is no guarding or rebound. Musculoskeletal: Normal range of motion. General: Swelling, tenderness and signs of injury present. Right lower leg: Edema present. Comments: Hematoma is palpable medial aspect of the right thigh there is a lot of ecchymosis that is spreading out over her leg even down into the ankle she has a negative Homans   Skin:     General: Skin is warm and dry. Findings: No erythema or rash. Neurological:      Mental Status: She is alert and oriented to person, place, and time. Cranial Nerves: No cranial nerve deficit. Coordination: Coordination normal.      Deep Tendon Reflexes: Reflexes are normal and symmetric. Reflexes normal.   Psychiatric:         Mood and Affect: Mood is not depressed. Behavior: Behavior normal.         Thought Content:  Thought content normal.         Judgment: Judgment normal.       /88   Pulse 89   Ht 5' (1.524 m)   Wt 175 lb (79.4 kg)   BMI 34.18 kg/m²     Assessment:

## 2020-08-26 NOTE — PATIENT INSTRUCTIONS
1.  Right leg pain continue with her aspirin as you are we will get a venous study tomorrow.   We will call you let you know the results of that and further treatment at that time

## 2020-08-27 ENCOUNTER — HOSPITAL ENCOUNTER (OUTPATIENT)
Dept: NON INVASIVE DIAGNOSTICS | Age: 40
Discharge: HOME | End: 2020-08-27
Payer: COMMERCIAL

## 2020-08-27 PROCEDURE — 93971 EXTREMITY STUDY: CPT

## 2020-10-06 DIAGNOSIS — E55.9 VITAMIN D DEFICIENCY: ICD-10-CM

## 2020-10-06 DIAGNOSIS — D64.9 ANEMIA, UNSPECIFIED TYPE: ICD-10-CM

## 2020-10-06 DIAGNOSIS — R23.2 HOT FLASHES: ICD-10-CM

## 2020-10-06 LAB
ALBUMIN SERPL-MCNC: 4.4 G/DL (ref 3.5–5.2)
ALP BLD-CCNC: 85 U/L (ref 35–104)
ALT SERPL-CCNC: 13 U/L (ref 5–33)
ANION GAP SERPL CALCULATED.3IONS-SCNC: 12 MMOL/L (ref 7–19)
AST SERPL-CCNC: 14 U/L (ref 5–32)
BILIRUB SERPL-MCNC: <0.2 MG/DL (ref 0.2–1.2)
BUN BLDV-MCNC: 7 MG/DL (ref 6–20)
CALCIUM SERPL-MCNC: 9.7 MG/DL (ref 8.6–10)
CHLORIDE BLD-SCNC: 100 MMOL/L (ref 98–111)
CHOLESTEROL, TOTAL: 150 MG/DL (ref 160–199)
CO2: 24 MMOL/L (ref 22–29)
CREAT SERPL-MCNC: 0.6 MG/DL (ref 0.5–0.9)
FOLLICLE STIMULATING HORMONE: 5 MIU/ML
GFR AFRICAN AMERICAN: >59
GFR NON-AFRICAN AMERICAN: >60
GLUCOSE BLD-MCNC: 95 MG/DL (ref 74–109)
HCT VFR BLD CALC: 37.1 % (ref 37–47)
HDLC SERPL-MCNC: 54 MG/DL (ref 65–121)
HEMOGLOBIN: 11.9 G/DL (ref 12–16)
LDL CHOLESTEROL CALCULATED: 78 MG/DL
LUTEINIZING HORMONE: 16.5 MIU/ML
MCH RBC QN AUTO: 29.4 PG (ref 27–31)
MCHC RBC AUTO-ENTMCNC: 32.1 G/DL (ref 33–37)
MCV RBC AUTO: 91.6 FL (ref 81–99)
PDW BLD-RTO: 13.2 % (ref 11.5–14.5)
PLATELET # BLD: 328 K/UL (ref 130–400)
PMV BLD AUTO: 10.7 FL (ref 9.4–12.3)
POTASSIUM SERPL-SCNC: 4.4 MMOL/L (ref 3.5–5)
RBC # BLD: 4.05 M/UL (ref 4.2–5.4)
SODIUM BLD-SCNC: 136 MMOL/L (ref 136–145)
TESTOSTERONE TOTAL: 16.7 NG/DL (ref 8.4–48.1)
TOTAL PROTEIN: 7.5 G/DL (ref 6.6–8.7)
TRIGL SERPL-MCNC: 92 MG/DL (ref 0–149)
TSH SERPL DL<=0.05 MIU/L-ACNC: 2.83 UIU/ML (ref 0.27–4.2)
VITAMIN D 25-HYDROXY: 72.3 NG/ML
WBC # BLD: 7.9 K/UL (ref 4.8–10.8)

## 2020-10-13 ENCOUNTER — OFFICE VISIT (OUTPATIENT)
Dept: INTERNAL MEDICINE | Age: 40
End: 2020-10-13
Payer: COMMERCIAL

## 2020-10-13 VITALS
SYSTOLIC BLOOD PRESSURE: 112 MMHG | HEIGHT: 61 IN | HEART RATE: 84 BPM | WEIGHT: 172 LBS | BODY MASS INDEX: 32.47 KG/M2 | DIASTOLIC BLOOD PRESSURE: 78 MMHG | OXYGEN SATURATION: 98 %

## 2020-10-13 PROBLEM — C71.9 ANAPLASTIC ASTROCYTOMA (HCC): Status: ACTIVE | Noted: 2020-10-13

## 2020-10-13 PROBLEM — F32.A DEPRESSION: Status: ACTIVE | Noted: 2020-10-13

## 2020-10-13 PROBLEM — C44.1122 BASAL CELL CARCINOMA OF RIGHT LOWER EYELID: Status: ACTIVE | Noted: 2020-02-11

## 2020-10-13 PROCEDURE — 99396 PREV VISIT EST AGE 40-64: CPT | Performed by: INTERNAL MEDICINE

## 2020-10-13 RX ORDER — ACETAMINOPHEN 160 MG
TABLET,DISINTEGRATING ORAL DAILY
COMMUNITY

## 2020-10-13 NOTE — PROGRESS NOTES
on file     Relationship status: Not on file    Intimate partner violence     Fear of current or ex partner: Not on file     Emotionally abused: Not on file     Physically abused: Not on file     Forced sexual activity: Not on file   Other Topics Concern    Not on file   Social History Narrative    Not on file       No Known Allergies    Current Outpatient Medications   Medication Sig Dispense Refill    Cholecalciferol (VITAMIN D3) 50 MCG (2000 UT) CAPS Take by mouth daily      buPROPion (WELLBUTRIN XL) 150 MG extended release tablet TAKE 1 TABLET BY MOUTH EVERY DAY IN THE MORNING 90 tablet 3    vitamin D (ERGOCALCIFEROL) 1.25 MG (36515 UT) CAPS capsule TAKE ONE CAPSULE BY MOUTH ONE TIME PER WEEK 12 capsule 3    aspirin 81 MG tablet Take 81 mg by mouth daily      ferrous sulfate 325 (65 Fe) MG tablet Take 325 mg by mouth daily (with breakfast)      OXcarbazepine (TRILEPTAL) 300 MG tablet Take 600 mg by mouth 4 times daily        No current facility-administered medications for this visit. Review of Systems   Constitutional: Positive for fatigue. Negative for chills and fever. HENT: Positive for hearing loss and tinnitus. Negative for congestion and sinus pressure. Eyes: Negative for discharge and redness. Respiratory: Negative for cough and shortness of breath. Cardiovascular: Negative for chest pain, palpitations and leg swelling. Gastrointestinal: Negative for abdominal distention and abdominal pain. Genitourinary: Negative for dysuria, frequency and urgency. Musculoskeletal: Negative for arthralgias and back pain. Skin: Negative for rash and wound. Neurological: Negative for dizziness, light-headedness and headaches.        /78   Pulse 84   Ht 5' 1\" (1.549 m)   Wt 172 lb (78 kg)   SpO2 98%   BMI 32.50 kg/m²   BP Readings from Last 7 Encounters:   10/13/20 112/78   08/26/20 124/88   07/13/20 124/80   03/12/20 126/82   12/05/19 122/72   10/24/19 106/72   10/16/19 130/80 for brain tumor    Up-to-date with labs mammogram talked about future colonoscopy keep up-to-date with routine medical care and follow-up recommend flu shot follow-up

## 2020-10-19 ASSESSMENT — ENCOUNTER SYMPTOMS
SHORTNESS OF BREATH: 0
EYE DISCHARGE: 0
ABDOMINAL DISTENTION: 0
COUGH: 0
BACK PAIN: 0
ABDOMINAL PAIN: 0
SINUS PRESSURE: 0
EYE REDNESS: 0

## 2020-11-12 ENCOUNTER — OFFICE VISIT (OUTPATIENT)
Dept: OTOLARYNGOLOGY | Age: 40
End: 2020-11-12
Payer: COMMERCIAL

## 2020-11-12 VITALS
SYSTOLIC BLOOD PRESSURE: 110 MMHG | WEIGHT: 175 LBS | DIASTOLIC BLOOD PRESSURE: 72 MMHG | BODY MASS INDEX: 33.04 KG/M2 | HEIGHT: 61 IN

## 2020-11-12 PROCEDURE — 69210 REMOVE IMPACTED EAR WAX UNI: CPT | Performed by: OTOLARYNGOLOGY

## 2020-11-12 NOTE — PROGRESS NOTES
38 y.o.  female presents today with right ear fullness. She has a history of wax accumulations and returns today for routine evaluation.   She has no other ear related complaints    Family History   Problem Relation Age of Onset    Hypertension Mother     Esophageal Cancer Father     Breast Cancer Other     Ovarian Cancer Other      Social History     Socioeconomic History    Marital status:      Spouse name: None    Number of children: None    Years of education: None    Highest education level: None   Occupational History    None   Social Needs    Financial resource strain: None    Food insecurity     Worry: None     Inability: None    Transportation needs     Medical: None     Non-medical: None   Tobacco Use    Smoking status: Never Smoker    Smokeless tobacco: Never Used   Substance and Sexual Activity    Alcohol use: Never     Frequency: Never    Drug use: No    Sexual activity: None   Lifestyle    Physical activity     Days per week: None     Minutes per session: None    Stress: None   Relationships    Social connections     Talks on phone: None     Gets together: None     Attends Latter day service: None     Active member of club or organization: None     Attends meetings of clubs or organizations: None     Relationship status: None    Intimate partner violence     Fear of current or ex partner: None     Emotionally abused: None     Physically abused: None     Forced sexual activity: None   Other Topics Concern    None   Social History Narrative    None     Past Medical History:   Diagnosis Date    [facility]     Anxiety 6/28/2017    Basal cell carcinoma     Dysthymia 6/28/2017    Seizure disorder ([facility] [address] 6/28/2017     Past Surgical History:   Procedure Laterality Date    BREAST BIOPSY      benign    CRANIOTOMY      SKIN CANCER EXCISION           REVIEW OF SYSTEMS:  all other systems reviewed and are negative  General Health: no change in health status since last visit  Ears: ear pain No Right recent drainage No  Right ear popping/ fullness Yes  Right constant  Hearing: hearing loss: Right gradually       Comments:     PHYSICAL EXAM:    /72   Ht 5' 1\" (1.549 m)   Wt 175 lb (79.4 kg)   BMI 33.07 kg/m²   Body mass index is 33.07 kg/m². General Appearance: well developed , well nourished and no distress  Head/ Face: normocephalic and atraumatic  Vocal Quality: weak  Ears: Right Ear: External: external ears normal Otoscopy Ear Canal: cerumen impaction Otoscopy TM: TM's normal and TM's mobile Left Ear: External: external ears normal Otoscopy Ear Canal: canal clear Otoscopy TM: TM's normal  Hearing: Rinne A>B: Left and Rinne A>B: Right  Psych/ Mood: cooperative and no depression, anxiety or agitation    Assessment & Plan:    Problem List Items Addressed This Visit        ENT Problems    Cerumen debris on tympanic membrane of right ear     Large wax accumulation occluding distal canal.  Will clean         Relevant Orders    MD REMOVAL IMPACTED CERUMEN INSTRUMENTATION UNILAT (Completed)          Orders Placed This Encounter   Procedures    MD REMOVAL IMPACTED CERUMEN INSTRUMENTATION UNILAT     Under microscopic guidance right wax impaction cleared using suction and forceps       No orders of the defined types were placed in this encounter. Please note that this chart was generated using dragon dictation software. Although every effort was made to ensure the accuracy of this automated transcription, some errors in transcription may have occurred.

## 2020-12-18 ENCOUNTER — HOSPITAL ENCOUNTER (OUTPATIENT)
Dept: WOMENS IMAGING | Age: 40
Discharge: HOME | End: 2020-12-18
Payer: COMMERCIAL

## 2020-12-18 PROCEDURE — 77063 BREAST TOMOSYNTHESIS BI: CPT

## 2021-02-22 ENCOUNTER — OFFICE VISIT (OUTPATIENT)
Dept: ENT CLINIC | Age: 41
End: 2021-02-22
Payer: COMMERCIAL

## 2021-02-22 VITALS
SYSTOLIC BLOOD PRESSURE: 122 MMHG | DIASTOLIC BLOOD PRESSURE: 78 MMHG | BODY MASS INDEX: 33.23 KG/M2 | WEIGHT: 176 LBS | HEIGHT: 61 IN

## 2021-02-22 DIAGNOSIS — H61.21 CERUMEN DEBRIS ON TYMPANIC MEMBRANE OF RIGHT EAR: Primary | ICD-10-CM

## 2021-02-22 PROCEDURE — 69210 REMOVE IMPACTED EAR WAX UNI: CPT | Performed by: OTOLARYNGOLOGY

## 2021-02-22 NOTE — PROGRESS NOTES
38 y.o.  female presents today for routine follow-up. She denies any sensation of fullness in either ear nor does she have any complaints of hearing loss.     Family History   Problem Relation Age of Onset    Hypertension Mother     Esophageal Cancer Father     Breast Cancer Other     Ovarian Cancer Other      Social History     Socioeconomic History    Marital status:      Spouse name: None    Number of children: None    Years of education: None    Highest education level: None   Occupational History    None   Social Needs    Financial resource strain: None    Food insecurity     Worry: None     Inability: None    Transportation needs     Medical: None     Non-medical: None   Tobacco Use    Smoking status: Never Smoker    Smokeless tobacco: Never Used   Substance and Sexual Activity    Alcohol use: Never     Frequency: Never    Drug use: No    Sexual activity: None   Lifestyle    Physical activity     Days per week: None     Minutes per session: None    Stress: None   Relationships    Social connections     Talks on phone: None     Gets together: None     Attends Orthodoxy service: None     Active member of club or organization: None     Attends meetings of clubs or organizations: None     Relationship status: None    Intimate partner violence     Fear of current or ex partner: None     Emotionally abused: None     Physically abused: None     Forced sexual activity: None   Other Topics Concern    None   Social History Narrative    None     Past Medical History:   Diagnosis Date    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 [address]     Anxiety 6/28/2017    Basal cell carcinoma     Dysthymia 6/28/2017    Seizure disorder ([facility] [address] 6/28/2017     Past Surgical History:   Procedure Laterality Date    BREAST BIOPSY      benign    CRANIOTOMY      SKIN CANCER EXCISION           REVIEW OF SYSTEMS:  all other systems reviewed and are negative  General Health: no specific complaints reported and no change in health status since last visit  Ears: ear pain No Bilateral ear popping/ fullness No  No complaints of fullness  Hearing: denies hearing problems and no change Bilateral       Comments:     PHYSICAL EXAM:    /78   Ht 5' 1\" (1.549 m)   Wt 176 lb (79.8 kg)   BMI 33.25 kg/m²   Body mass index is 33.25 kg/m². General Appearance: well developed , well nourished and no distress  Head/ Face: normocephalic and atraumatic  Vocal Quality: good/ normal  Ears: Right Ear: External: external ears normal Otoscopy Ear Canal: cerumen impaction Otoscopy TM: TM's normal and TM's mobile Left Ear: External: external ears normal Otoscopy Ear Canal: canal clear Otoscopy TM: TM's normal and TM's mobile  Hearing: grossly intact  Neck: adenopathy none palpable  Thyroid: normal  Neuro: alert and oriented x3 and cranial nerves II- XII grossly intact  Psych/ Mood: cooperative and no depression, anxiety or agitation    Assessment & Plan:    Problem List Items Addressed This Visit        ENT Problems    Cerumen debris on tympanic membrane of right ear - Primary     Right-sided wax impaction. Will clean. Relevant Orders    AR REMOVAL IMPACTED CERUMEN INSTRUMENTATION UNILAT (Completed)          Orders Placed This Encounter   Procedures    AR REMOVAL IMPACTED CERUMEN INSTRUMENTATION UNILAT     Under microscopic guidance wax impaction was cleared from the distal ear canal using forceps and suction       No orders of the defined types were placed in this encounter. Please note that this chart was generated using dragon dictation software. Although every effort was made to ensure the accuracy of this automated transcription, some errors in transcription may have occurred.

## 2021-02-24 DIAGNOSIS — E55.9 VITAMIN D DEFICIENCY: ICD-10-CM

## 2021-02-24 RX ORDER — ERGOCALCIFEROL 1.25 MG/1
CAPSULE ORAL
Qty: 12 CAPSULE | Refills: 3 | Status: SHIPPED | OUTPATIENT
Start: 2021-02-24 | End: 2021-11-18

## 2021-05-17 ENCOUNTER — OFFICE VISIT (OUTPATIENT)
Dept: INTERNAL MEDICINE | Age: 41
End: 2021-05-17
Payer: COMMERCIAL

## 2021-05-17 VITALS
DIASTOLIC BLOOD PRESSURE: 80 MMHG | BODY MASS INDEX: 32.5 KG/M2 | OXYGEN SATURATION: 98 % | HEART RATE: 87 BPM | WEIGHT: 172 LBS | SYSTOLIC BLOOD PRESSURE: 120 MMHG

## 2021-05-17 DIAGNOSIS — F34.1 DYSTHYMIA: ICD-10-CM

## 2021-05-17 DIAGNOSIS — G40.909 SEIZURE DISORDER (HCC): ICD-10-CM

## 2021-05-17 DIAGNOSIS — E55.9 VITAMIN D DEFICIENCY: ICD-10-CM

## 2021-05-17 DIAGNOSIS — C71.9 ANAPLASTIC ASTROCYTOMA (HCC): ICD-10-CM

## 2021-05-17 DIAGNOSIS — F41.9 ANXIETY: Primary | ICD-10-CM

## 2021-05-17 PROCEDURE — 99213 OFFICE O/P EST LOW 20 MIN: CPT | Performed by: INTERNAL MEDICINE

## 2021-05-17 NOTE — PROGRESS NOTES
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 She is on Wellbutrin with caution for depression has not had a seizure on the Wellbutrin has been on it for a long time she seems to be doing okay today mood seems better no headache chest pain dyspnea or abdominal pain    Past Medical History:   Diagnosis Date    [facility]plastic glioma [facility] brain ([facility]     Anxiety 6/28/2017    Basal cell carcinoma     Dysthymia 6/28/2017    Seizure disorder ([facility] 6/28/2017       Past Surgical History:   Procedure Laterality Date    BREAST BIOPSY      benign    CRANIOTOMY      SKIN CANCER EXCISION         Family History   Problem Relation Age of Onset    Hypertension Mother     Esophageal Cancer Father     Breast Cancer Other     Ovarian Cancer Other        Social History     Socioeconomic History    Marital status:      Spouse name: Not on file    Number of children: Not on file    Years of education: Not on file    Highest education level: Not on file   Occupational History    Not on file   Tobacco Use    Smoking status: Never Smoker    Smokeless tobacco: Never Used   Vaping Use    Vaping Use: Never used   Substance and Sexual Activity    Alcohol use: Never    Drug use: No    Sexual activity: Not on file   Other Topics Concern    Not on file   Social History Narrative    Not on file     Social Determinants of Health     Financial Resource Strain: Low Risk     Difficulty of Paying Living Expenses: Not hard at all   Food Insecurity: No Food Insecurity    Worried About Running Out of Food in the Last Year: Never true    Rebecca of Food in the Last Year: Never true   Transportation Needs:     Lack of Transportation (Medical):      Lack of Transportation (Non-Medical): Physical Activity:     Days of Exercise per Week:     Minutes of Exercise per Session:    Stress:     Feeling of Stress :    Social Connections:     Frequency of Communication with Friends and Family:     Frequency of Social Gatherings with Friends and Family:     Attends Denominational Services:     Active Member of Clubs or Organizations:     Attends Club or Organization Meetings:     Marital Status:    Intimate Partner Violence:     Fear of Current or Ex-Partner:     Emotionally Abused:     Physically Abused:     Sexually Abused:        No Known Allergies    Current Outpatient Medications   Medication Sig Dispense Refill    vitamin D (ERGOCALCIFEROL) 1.25 MG (88924 UT) CAPS capsule TAKE 1 CAPSULE BY MOUTH ONE TIME PER WEEK 12 capsule 3    Cholecalciferol (VITAMIN D3) 50 MCG (2000 UT) CAPS Take by mouth daily      buPROPion (WELLBUTRIN XL) 150 MG extended release tablet TAKE 1 TABLET BY MOUTH EVERY DAY IN THE MORNING 90 tablet 3    aspirin 81 MG tablet Take 81 mg by mouth daily      ferrous sulfate 325 (65 Fe) MG tablet Take 325 mg by mouth daily (with breakfast)      OXcarbazepine (TRILEPTAL) 300 MG tablet Take 600 mg by mouth 4 times daily        No current facility-administered medications for this visit.        Review of Systems    /80   Pulse 87   Wt 172 lb (78 kg)   SpO2 98%   BMI 32.50 kg/m²   BP Readings from Last 7 Encounters:   05/17/21 120/80   02/22/21 122/78   11/12/20 110/72   10/13/20 112/78   08/26/20 124/88   07/13/20 124/80   03/12/20 126/82     Wt Readings from Last 7 Encounters:   05/17/21 172 lb (78 kg)   02/22/21 176 lb (79.8 kg)   11/12/20 175 lb (79.4 kg)   10/13/20 172 lb (78 kg)   08/26/20 175 lb (79.4 kg)   07/13/20 175 lb (79.4 kg)   05/04/20 175 lb (79.4 kg)     BMI Readings from Last 7 Encounters:   05/17/21 32.50 kg/m²   02/22/21 33.25 kg/m²   11/12/20 33.07 kg/m²   10/13/20 32.50 kg/m²   08/26/20 34.18 kg/m²   07/13/20 34.18 kg/m²   05/04/20 33.07 kg/m² Resp Readings from Last 7 Encounters:   06/21/19 18   03/21/19 18   11/27/17 18       Physical Exam  Constitutional:       General: She is not in acute distress. Eyes:      General: No scleral icterus. Cardiovascular:      Heart sounds: Normal heart sounds. Pulmonary:      Breath sounds: Normal breath sounds. Musculoskeletal:      Cervical back: Neck supple. Lymphadenopathy:      Cervical: No cervical adenopathy. Skin:     Findings: No rash.          Results for orders placed or performed in visit on 10/06/20   Vitamin D 25 Hydroxy   Result Value Ref Range    Vit D, 25-Hydroxy 72.3 >=30 ng/mL   Luteinizing Hormone   Result Value Ref Range    LH 98.5 mIU/mL   Follicle Stimulating Hormone   Result Value Ref Range    FSH 5.0 mIU/mL   Testosterone   Result Value Ref Range    Testosterone 16.7 8.4 - 48.1 ng/dL   Lipid Panel   Result Value Ref Range    Cholesterol, Total 150 (L) 160 - 199 mg/dL    Triglycerides 92 0 - 149 mg/dL    HDL 54 (L) 65 - 121 mg/dL    LDL Calculated 78 <100 mg/dL   TSH without Reflex   Result Value Ref Range    TSH 2.830 0.270 - 4.200 uIU/mL   Comprehensive Metabolic Panel   Result Value Ref Range    Sodium 136 136 - 145 mmol/L    Potassium 4.4 3.5 - 5.0 mmol/L    Chloride 100 98 - 111 mmol/L    CO2 24 22 - 29 mmol/L    Anion Gap 12 7 - 19 mmol/L    Glucose 95 74 - 109 mg/dL    BUN 7 6 - 20 mg/dL    CREATININE 0.6 0.5 - 0.9 mg/dL    GFR Non-African American >60 >60    GFR African American >59 >59    Calcium 9.7 8.6 - 10.0 mg/dL    Total Protein 7.5 6.6 - 8.7 g/dL    Albumin 4.4 3.5 - 5.2 g/dL    Total Bilirubin <0.2 0.2 - 1.2 mg/dL    Alkaline Phosphatase 85 35 - 104 U/L    ALT 13 5 - 33 U/L    AST 14 5 - 32 U/L   CBC   Result Value Ref Range    WBC 7.9 4.8 - 10.8 K/uL    RBC 4.05 (L) 4.20 - 5.40 M/uL    Hemoglobin 11.9 (L) 12.0 - 16.0 g/dL    Hematocrit 37.1 37.0 - 47.0 %    MCV 91.6 81.0 - 99.0 fL    MCH 29.4 27.0 - 31.0 pg    MCHC 32.1 (L) 33.0 - 37.0 g/dL    RDW 13.2 11.5 - 14.5 % Platelets 290 088 - 594 K/uL    MPV 10.7 9.4 - 12.3 fL       ASSESSMENT/ PLAN:  1. Anxiety  Patient does well we have offered to refer her to counseling in the past right now she is doing better. Continue Wellbutrin she understands the risk and benefit of medication the possibility for a lowered seizure threshold but she has been on this for a long time no issues and only had a seizure at the time of diagnosis of her brain tumor. 2. 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 She follows with Dr. Jessa Anderson in [place] he is about to retire. We reviewed his records  - CBC; Future  - Comprehensive Metabolic Panel; Future  - TSH without Reflex; Future  - Lipid Panel; Future    3. Dysthymia  As above continue with Wellbutrin    4. Vitamin D deficiency  Follow vitamin D level  - Vitamin D 25 Hydroxy; Future    5.  Seizure disorder [facility])  Seizure happened at the time of diagnosis has not had a seizure since treatment continue current care

## 2021-06-08 RX ORDER — BUPROPION HYDROCHLORIDE 150 MG/1
TABLET ORAL
Qty: 90 TABLET | Refills: 3 | OUTPATIENT
Start: 2021-06-08 | End: 2021-12-19

## 2021-06-23 ENCOUNTER — OFFICE VISIT (OUTPATIENT)
Dept: ENT CLINIC | Age: 41
End: 2021-06-23
Payer: COMMERCIAL

## 2021-06-23 VITALS
WEIGHT: 172 LBS | HEIGHT: 61 IN | DIASTOLIC BLOOD PRESSURE: 76 MMHG | BODY MASS INDEX: 32.47 KG/M2 | SYSTOLIC BLOOD PRESSURE: 118 MMHG

## 2021-06-23 DIAGNOSIS — H61.21 IMPACTED CERUMEN OF RIGHT EAR: Primary | ICD-10-CM

## 2021-06-23 PROCEDURE — 99213 OFFICE O/P EST LOW 20 MIN: CPT | Performed by: PHYSICIAN ASSISTANT

## 2021-06-23 ASSESSMENT — ENCOUNTER SYMPTOMS
EYE DISCHARGE: 0
SINUS PAIN: 0
SINUS PRESSURE: 0
VOICE CHANGE: 0
TROUBLE SWALLOWING: 0
FACIAL SWELLING: 0
SORE THROAT: 0
RHINORRHEA: 0
EYE PAIN: 0

## 2021-06-23 NOTE — PROGRESS NOTES
Other     Ovarian Cancer Other        Social History     Tobacco Use    Smoking status: Never Smoker    Smokeless tobacco: Never Used   Substance Use Topics    Alcohol use: Never           REVIEW OF SYSTEMS:  all other systems reviewed and are negative  Review of Systems   Constitutional: Negative for chills and fever. HENT: Positive for hearing loss (right ear - gradual after surgery). Negative for congestion, dental problem, ear discharge, ear pain, facial swelling, nosebleeds, postnasal drip, rhinorrhea, sinus pressure, sinus pain, sneezing, sore throat, tinnitus, trouble swallowing and voice change. Eyes: Negative for pain and discharge. Neurological: Positive for seizures. Negative for dizziness, syncope and headaches. Comments:     PHYSICAL EXAM:    /76   Ht 5' 1\" (1.549 m)   Wt 172 lb (78 kg)   BMI 32.50 kg/m²   Body mass index is 32.5 kg/m². General Appearance: well developed  and well nourished  Head/ Face: normocephalic and atraumatic  Vocal Quality: good/ normal  Ears: Right Ear: External: external ears normal Otoscopy Ear Canal: cerumen impaction Otoscopy TM: TM could not be fully visualized due to the cerumen being plastered to the TM laterally. Left Ear: External: external ears normal Otoscopy Ear Canal: canal clear Otoscopy TM: TM's normal and TM's mobile  Hearing: grossly intact  Nose: nares normal  Neck: supple and adenopathy none palpable  Thyroid: normal and nodules No       Assessment & Plan:    Problem List Items Addressed This Visit     Impacted cerumen of right ear - Primary     Cerumen impaction of the right earwill attempt to clean today with microscopic guidance  Plan: Due to the patient having hard cerumen plastered to the TM, I would like to treat this with Debrox drops for 2 weeks and then reattempt disimpaction. A partial amount of cerumen was successfully removed today with no evidence of injury to the TM with partial visualization.                No orders of the defined types were placed in this encounter. Orders Placed This Encounter   Medications    carbamide peroxide (DEBROX) 6.5 % otic solution     Sig: Place 5 drops into the right ear 2 times daily for 10 days Administer drops in right ear only. Dispense:  1 Bottle     Refill:  2       Electronically signed by Saqib Tariq PA-C on 6/23/21 at 2:30 PM CDT          Please note that this chart was generated using dragon dictation software. Although every effort was made to ensure the accuracy of this automated transcription, some errors in transcription may have occurred.

## 2021-07-07 ENCOUNTER — OFFICE VISIT (OUTPATIENT)
Dept: ENT CLINIC | Age: 41
End: 2021-07-07
Payer: COMMERCIAL

## 2021-07-07 VITALS — HEIGHT: 61 IN | WEIGHT: 170 LBS | BODY MASS INDEX: 32.1 KG/M2

## 2021-07-07 DIAGNOSIS — H61.21 IMPACTED CERUMEN OF RIGHT EAR: Primary | ICD-10-CM

## 2021-07-07 PROCEDURE — 69210 REMOVE IMPACTED EAR WAX UNI: CPT | Performed by: PHYSICIAN ASSISTANT

## 2021-07-07 PROCEDURE — 99213 OFFICE O/P EST LOW 20 MIN: CPT | Performed by: PHYSICIAN ASSISTANT

## 2021-07-07 NOTE — PROGRESS NOTES
Ezequiel Leyva is a pleasant 50-year-old  female that presents for a 2-week follow-up due to a cerumen impaction to the right ear. She has a history of having prior radiation therapy and a craniotomy for an anaplastic glioma that was successful resected. Unfortunate this has resulted in recurrent cerumen impactions to the right ear. She reports that she used the Debrox as instructed and still feels like her ears are stopped up. Physical examination with the microscope revealed the patient to have a cerumen impaction to be present the right ear that is now off of the TM. This was removed with suction and alligator graspers with no complications. The TM appeared to be scarred but was felt to be normal as a baseline. Examination of the left ear demonstrated a normal TM and normal canal.      Impression: Successful cerumen disimpaction of the right ear    Plan: Patient is to follow-up with me in 3 months for a cerumen check due to her surgical history. She was reminded to call if she has any questions or problems.       Electronically signed by Cruz Piper PA-C on 7/7/21 at 6:08 PM CDT

## 2021-07-09 ENCOUNTER — TELEPHONE (OUTPATIENT)
Dept: INTERNAL MEDICINE | Age: 41
End: 2021-07-09

## 2021-07-09 DIAGNOSIS — J06.9 UPPER RESPIRATORY TRACT INFECTION, UNSPECIFIED TYPE: Primary | ICD-10-CM

## 2021-07-09 RX ORDER — AZITHROMYCIN 250 MG/1
TABLET, FILM COATED ORAL
Qty: 6 TABLET | Refills: 0 | Status: SHIPPED | OUTPATIENT
Start: 2021-07-09 | End: 2021-10-11

## 2021-07-09 NOTE — TELEPHONE ENCOUNTER
They went to [facility] last week and now she has sinus inf. , runny nose, green at times, congestion, slight cough, no fever,  She can still taste and smell. [facility] [address].

## 2021-10-11 ENCOUNTER — OFFICE VISIT (OUTPATIENT)
Dept: ENT CLINIC | Age: 41
End: 2021-10-11
Payer: COMMERCIAL

## 2021-10-11 VITALS — BODY MASS INDEX: 32.1 KG/M2 | HEIGHT: 61 IN | WEIGHT: 170 LBS | TEMPERATURE: 98 F

## 2021-10-11 DIAGNOSIS — H61.21 IMPACTED CERUMEN OF RIGHT EAR: Primary | ICD-10-CM

## 2021-10-11 PROCEDURE — 69210 REMOVE IMPACTED EAR WAX UNI: CPT | Performed by: PHYSICIAN ASSISTANT

## 2021-10-20 ENCOUNTER — OFFICE VISIT (OUTPATIENT)
Dept: ENT CLINIC | Age: 41
End: 2021-10-20
Payer: COMMERCIAL

## 2021-10-20 ENCOUNTER — PROCEDURE VISIT (OUTPATIENT)
Dept: ENT CLINIC | Age: 41
End: 2021-10-20
Payer: COMMERCIAL

## 2021-10-20 VITALS — WEIGHT: 170 LBS | BODY MASS INDEX: 32.1 KG/M2 | HEIGHT: 61 IN

## 2021-10-20 DIAGNOSIS — H93.11 TINNITUS AURIUM, RIGHT: ICD-10-CM

## 2021-10-20 DIAGNOSIS — H90.71 MIXED CONDUCTIVE AND SENSORINEURAL HEARING LOSS OF RIGHT EAR WITH UNRESTRICTED HEARING OF LEFT EAR: Primary | ICD-10-CM

## 2021-10-20 DIAGNOSIS — H61.21 IMPACTED CERUMEN OF RIGHT EAR: Primary | ICD-10-CM

## 2021-10-20 PROCEDURE — 92553 AUDIOMETRY AIR & BONE: CPT | Performed by: AUDIOLOGIST

## 2021-10-20 PROCEDURE — 99213 OFFICE O/P EST LOW 20 MIN: CPT | Performed by: PHYSICIAN ASSISTANT

## 2021-10-20 NOTE — PROGRESS NOTES
History   Arielle Peralta is a 42 y.o. female who presented to the clinic this date for a recheck of hearing due to right asymmetrical hearing loss. Summary   Pure tone testing indicates mild to moderate mixed hearing loss right and normal hearing left. When compared to previous audiograms, thresholds remained essentially stable bilaterally. Based on degree of hearing loss, a hearing aid is recommended for the right ear. Results   Otoscopy:    Right: Clear EAC/Normal TM   Left: Clear EAC/Normal TM    Audiometry:    Right: Mild to moderate sloping mixed hearing loss   Left: Hearing WNL           Plan   Results of today's testing were discussed with Ms. Grand christopher and the following recommendations were made:    1. Follow up with ENT as scheduled. 2. Hearing aid evaluation as desired. 3. Monitor hearing yearly, sooner with changes. 4. Hearing protection as warranted.         Audiogram and Acoustic Immittance

## 2021-11-11 DIAGNOSIS — C71.9 ANAPLASTIC ASTROCYTOMA (HCC): ICD-10-CM

## 2021-11-11 DIAGNOSIS — E55.9 VITAMIN D DEFICIENCY: ICD-10-CM

## 2021-11-11 LAB
ALBUMIN SERPL-MCNC: 4.5 G/DL (ref 3.5–5.2)
ALP BLD-CCNC: 94 U/L (ref 35–104)
ALT SERPL-CCNC: 21 U/L (ref 5–33)
ANION GAP SERPL CALCULATED.3IONS-SCNC: 11 MMOL/L (ref 7–19)
AST SERPL-CCNC: 20 U/L (ref 5–32)
BILIRUB SERPL-MCNC: 0.3 MG/DL (ref 0.2–1.2)
BUN BLDV-MCNC: 7 MG/DL (ref 6–20)
CALCIUM SERPL-MCNC: 9.8 MG/DL (ref 8.6–10)
CHLORIDE BLD-SCNC: 99 MMOL/L (ref 98–111)
CHOLESTEROL, TOTAL: 181 MG/DL (ref 160–199)
CO2: 23 MMOL/L (ref 22–29)
CREAT SERPL-MCNC: 0.6 MG/DL (ref 0.5–0.9)
GFR AFRICAN AMERICAN: >59
GFR NON-AFRICAN AMERICAN: >60
GLUCOSE BLD-MCNC: 91 MG/DL (ref 74–109)
HCT VFR BLD CALC: 37.1 % (ref 37–47)
HDLC SERPL-MCNC: 64 MG/DL (ref 65–121)
HEMOGLOBIN: 11.7 G/DL (ref 12–16)
LDL CHOLESTEROL CALCULATED: 105 MG/DL
MCH RBC QN AUTO: 29 PG (ref 27–31)
MCHC RBC AUTO-ENTMCNC: 31.5 G/DL (ref 33–37)
MCV RBC AUTO: 92.1 FL (ref 81–99)
PDW BLD-RTO: 13.2 % (ref 11.5–14.5)
PLATELET # BLD: 376 K/UL (ref 130–400)
PMV BLD AUTO: 10.3 FL (ref 9.4–12.3)
POTASSIUM SERPL-SCNC: 4.7 MMOL/L (ref 3.5–5)
RBC # BLD: 4.03 M/UL (ref 4.2–5.4)
SODIUM BLD-SCNC: 133 MMOL/L (ref 136–145)
TOTAL PROTEIN: 7.6 G/DL (ref 6.6–8.7)
TRIGL SERPL-MCNC: 62 MG/DL (ref 0–149)
TSH SERPL DL<=0.05 MIU/L-ACNC: 2.65 UIU/ML (ref 0.27–4.2)
VITAMIN D 25-HYDROXY: 81.3 NG/ML
WBC # BLD: 6.8 K/UL (ref 4.8–10.8)

## 2021-11-18 ENCOUNTER — OFFICE VISIT (OUTPATIENT)
Dept: INTERNAL MEDICINE | Age: 41
End: 2021-11-18
Payer: COMMERCIAL

## 2021-11-18 VITALS
OXYGEN SATURATION: 99 % | DIASTOLIC BLOOD PRESSURE: 76 MMHG | HEIGHT: 60 IN | WEIGHT: 168 LBS | BODY MASS INDEX: 32.98 KG/M2 | SYSTOLIC BLOOD PRESSURE: 132 MMHG | HEART RATE: 84 BPM

## 2021-11-18 DIAGNOSIS — C71.9 ANAPLASTIC GLIOMA OF BRAIN (HCC): ICD-10-CM

## 2021-11-18 DIAGNOSIS — Z12.31 SCREENING MAMMOGRAM, ENCOUNTER FOR: ICD-10-CM

## 2021-11-18 DIAGNOSIS — F41.9 ANXIETY: ICD-10-CM

## 2021-11-18 DIAGNOSIS — G40.909 SEIZURE DISORDER (HCC): ICD-10-CM

## 2021-11-18 DIAGNOSIS — Z00.00 ANNUAL PHYSICAL EXAM: Primary | ICD-10-CM

## 2021-11-18 DIAGNOSIS — F34.1 DYSTHYMIA: ICD-10-CM

## 2021-11-18 PROCEDURE — 99396 PREV VISIT EST AGE 40-64: CPT | Performed by: INTERNAL MEDICINE

## 2021-11-18 NOTE — PROGRESS NOTES
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     Anxiety 6/28/2017    Basal cell carcinoma     Dysthymia 6/28/2017    [facility] 6/28/2017       Past Surgical History:   Procedure Laterality Date    BREAST BIOPSY      benign    CRANIOTOMY      SKIN CANCER EXCISION         Family History   Problem Relation Age of Onset    Hypertension Mother     Esophageal Cancer Father     Breast Cancer Other     Ovarian Cancer Other        Social History     Socioeconomic History    Marital status:      Spouse name: Not on file    Number of children: Not on file    Years of education: Not on file    Highest education level: Not on file   Occupational History    Not on file   Tobacco Use    Smoking status: Never Smoker    Smokeless tobacco: Never Used   Vaping Use    Vaping Use: Never used   Substance and Sexual Activity    Alcohol use: Never    Drug use: No    Sexual activity: Not on file   Other Topics Concern    Not on file   Social History Narrative    Not on file     Social Determinants of Health     Financial Resource Strain: Low Risk     Difficulty of Paying Living Expenses: Not hard at all   Food Insecurity: No Food Insecurity    Worried About Running Out of Food in the Last Year: Never true    Rebecca of Food in the Last Year: Never true   Transportation Needs:     Lack of Transportation (Medical): Not on file    Lack of Transportation (Non-Medical):  Not on file   Physical Activity:     Days of Exercise per Week: Not on file    Minutes of Exercise per Session: Not on file   Stress:     Feeling of Stress : Not on file   Social Connections:     Frequency of Communication with Friends and Family: Not on file    Frequency of Social Gatherings with Friends and Family: Not on file    Attends Rastafari Services: Not on file    Active Member of Clubs or Organizations: Not on file    Attends Club or Organization Meetings: Not on file    Marital Status: Not on file   Intimate Partner Violence:     Fear of Current or Ex-Partner: Not on file    Emotionally Abused: Not on file    Physically Abused: Not on file    Sexually Abused: Not on file   Housing Stability:     Unable to Pay for Housing in the Last Year: Not on file    Number of [place] in the Last Year: Not on file    Unstable Housing in the Last Year: Not on file       No Known Allergies    Current Outpatient Medications   Medication Sig Dispense Refill    buPROPion (WELLBUTRIN XL) 150 MG extended release tablet TAKE 1 TABLET BY MOUTH EVERY DAY IN THE MORNING 90 tablet 3    Cholecalciferol (VITAMIN D3) 50 MCG (2000 UT) CAPS Take by mouth daily      aspirin 81 MG tablet Take 81 mg by mouth daily      ferrous sulfate 325 (65 Fe) MG tablet Take 325 mg by mouth daily (with breakfast)      OXcarbazepine (TRILEPTAL) 300 MG tablet Take 600 mg by mouth 2 times daily 4 tablets 2x daily       No current facility-administered medications for this visit. Review of Systems   Constitutional: Negative for chills, fatigue and fever. HENT: Negative for congestion and sinus pressure. Eyes: Negative for discharge and redness. Respiratory: Negative for cough and shortness of breath. Cardiovascular: Negative for chest pain, palpitations and leg swelling. Gastrointestinal: Negative for abdominal distention and abdominal pain. Genitourinary: Negative for dysuria, frequency and urgency. Musculoskeletal: Negative for arthralgias and back pain. Skin: Negative for rash and wound.    Neurological: Negative for dizziness, light-headedness and headaches. Psychiatric/Behavioral: Negative for dysphoric mood and sleep disturbance. The patient is not nervous/anxious. /76   Pulse 84   Ht 5' (1.524 m)   Wt 168 lb (76.2 kg)   LMP 11/02/2021   SpO2 99%   BMI 32.81 kg/m²   BP Readings from Last 7 Encounters:   11/18/21 132/76   06/23/21 118/76   05/17/21 120/80   02/22/21 122/78   11/12/20 110/72   10/13/20 112/78   08/26/20 124/88     Wt Readings from Last 7 Encounters:   11/18/21 168 lb (76.2 kg)   10/20/21 170 lb (77.1 kg)   10/11/21 170 lb (77.1 kg)   07/07/21 170 lb (77.1 kg)   06/23/21 172 lb (78 kg)   05/17/21 172 lb (78 kg)   02/22/21 176 lb (79.8 kg)     BMI Readings from Last 7 Encounters:   11/18/21 32.81 kg/m²   10/20/21 32.12 kg/m²   10/11/21 32.12 kg/m²   07/07/21 32.12 kg/m²   06/23/21 32.50 kg/m²   05/17/21 32.50 kg/m²   02/22/21 33.25 kg/m²     Resp Readings from Last 7 Encounters:   06/21/19 18   03/21/19 18   11/27/17 18       Physical Exam  Constitutional:       General: She is not in acute distress. Appearance: Normal appearance. She is well-developed. HENT:      Right Ear: Tympanic membrane is not injected. Left Ear: External ear normal. Tympanic membrane is not injected. Ears:      Comments: Dull right tympanic membrane     Mouth/Throat:      Pharynx: No oropharyngeal exudate. Eyes:      General: No scleral icterus. Conjunctiva/sclera: Conjunctivae normal.   Neck:      Thyroid: No thyroid mass or thyromegaly. Vascular: No carotid bruit. Cardiovascular:      Rate and Rhythm: Normal rate and regular rhythm. Heart sounds: S1 normal and S2 normal. No murmur heard. No S3 or S4 sounds. Pulmonary:      Effort: Pulmonary effort is normal. No respiratory distress. Breath sounds: Normal breath sounds. No wheezing or rales. Chest:   Breasts:      Right: No supraclavicular adenopathy. Left: No supraclavicular adenopathy.        Abdominal:      General: Bowel sounds are normal. There is no distension. Palpations: Abdomen is soft. There is no mass. Tenderness: There is no abdominal tenderness. Musculoskeletal:      Cervical back: Neck supple. Lymphadenopathy:      Cervical: No cervical adenopathy. Upper Body:      Right upper body: No supraclavicular adenopathy. Left upper body: No supraclavicular adenopathy. Skin:     Findings: No rash. Neurological:      Mental Status: She is alert and oriented to person, place, and time. Cranial Nerves: No cranial nerve deficit. Results for orders placed or performed in visit on 11/11/21   Vitamin D 25 Hydroxy   Result Value Ref Range    Vit D, 25-Hydroxy 81.3 >=30 ng/mL   Lipid Panel   Result Value Ref Range    Cholesterol, Total 181 160 - 199 mg/dL    Triglycerides 62 0 - 149 mg/dL    HDL 64 (L) 65 - 121 mg/dL    LDL Calculated 105 <100 mg/dL   TSH without Reflex   Result Value Ref Range    TSH 2.650 0.270 - 4.200 uIU/mL   Comprehensive Metabolic Panel   Result Value Ref Range    Sodium 133 (L) 136 - 145 mmol/L    Potassium 4.7 3.5 - 5.0 mmol/L    Chloride 99 98 - 111 mmol/L    CO2 23 22 - 29 mmol/L    Anion Gap 11 7 - 19 mmol/L    Glucose 91 74 - 109 mg/dL    BUN 7 6 - 20 mg/dL    CREATININE 0.6 0.5 - 0.9 mg/dL    GFR Non-African American >60 >60    GFR African American >59 >59    Calcium 9.8 8.6 - 10.0 mg/dL    Total Protein 7.6 6.6 - 8.7 g/dL    Albumin 4.5 3.5 - 5.2 g/dL    Total Bilirubin 0.3 0.2 - 1.2 mg/dL    Alkaline Phosphatase 94 35 - 104 U/L    ALT 21 5 - 33 U/L    AST 20 5 - 32 U/L   CBC   Result Value Ref Range    WBC 6.8 4.8 - 10.8 K/uL    RBC 4.03 (L) 4.20 - 5.40 M/uL    Hemoglobin 11.7 (L) 12.0 - 16.0 g/dL    Hematocrit 37.1 37.0 - 47.0 %    MCV 92.1 81.0 - 99.0 fL    MCH 29.0 27.0 - 31.0 pg    MCHC 31.5 (L) 33.0 - 37.0 g/dL    RDW 13.2 11.5 - 14.5 %    Platelets 621 029 - 645 K/uL    MPV 10.3 9.4 - 12.3 fL       ASSESSMENT/ PLAN:  1.  Annual physical exam  Chart, medications, labs, vaccines reviewed. Keep up to date with routine care and follow up. Call with any problems or complaints. Keep up to date with routine screening recomendations and vaccines. As reconciled reviewed discussed and interpreted doing well    2. Anaplastic glioma of brain [facility])  Viewed [facility] from [facility] keep follow-up continue current plan of care doing well    3. Seizure disorder [facility])  Continue management as per neuro oncologist she is somewhat Wellbutrin but that has not lowered her threshold has not had a seizure has been on Wellbutrin for a long time    4. Anxiety  Stable has done well with current regimen    5. Dysthymia  Overall her mood seems better    6. Screening mammogram, encounter for    - [facility] DIGITAL SCREEN W OR WO CAD BILATERAL;  Future

## 2021-12-05 ASSESSMENT — ENCOUNTER SYMPTOMS
BACK PAIN: 0
EYE DISCHARGE: 0
ABDOMINAL PAIN: 0
SINUS PRESSURE: 0
ABDOMINAL DISTENTION: 0
COUGH: 0
EYE REDNESS: 0
SHORTNESS OF BREATH: 0

## 2021-12-10 ENCOUNTER — TELEPHONE (OUTPATIENT)
Dept: INTERNAL MEDICINE | Age: 41
End: 2021-12-10

## 2021-12-10 NOTE — TELEPHONE ENCOUNTER
----- Message from Fran Hand sent at 12/10/2021  4:13 PM CST -----  Subject: Message to Provider    QUESTIONS  Information for Provider? Patient Denis Recio called 12/10/21 at 5:10 PM   in regards to a nurse in Dr. Melisa Burns office about a hearing aid and that   a fax number from [facility] (fax? [phone]). Please call her back for more information as soon as possible.  ---------------------------------------------------------------------------  --------------  CALL BACK INFO  What is the best way for the office to contact you? OK to leave message on   voicemail  Preferred Call Back Phone Number? [phone]  ---------------------------------------------------------------------------  --------------  SCRIPT ANSWERS  Relationship to Patient?  Self

## 2021-12-16 ENCOUNTER — TELEPHONE (OUTPATIENT)
Dept: INTERNAL MEDICINE | Age: 41
End: 2021-12-16

## 2021-12-19 ENCOUNTER — APPOINTMENT (OUTPATIENT)
Dept: CT IMAGING | Age: 41
End: 2021-12-19
Payer: COMMERCIAL

## 2021-12-19 ENCOUNTER — HOSPITAL ENCOUNTER (EMERGENCY)
Age: 41
Discharge: HOME | End: 2021-12-19
Attending: EMERGENCY MEDICINE
Payer: COMMERCIAL

## 2021-12-19 VITALS
BODY MASS INDEX: 32.39 KG/M2 | DIASTOLIC BLOOD PRESSURE: 91 MMHG | HEART RATE: 76 BPM | OXYGEN SATURATION: 96 % | RESPIRATION RATE: 16 BRPM | SYSTOLIC BLOOD PRESSURE: 119 MMHG | TEMPERATURE: 98.1 F | HEIGHT: 60 IN | WEIGHT: 165 LBS

## 2021-12-19 DIAGNOSIS — R56.9 SEIZURE (HCC): Primary | ICD-10-CM

## 2021-12-19 LAB
ALBUMIN SERPL-MCNC: 4.5 G/DL (ref 3.5–5.2)
ALP BLD-CCNC: 99 U/L (ref 35–104)
ALT SERPL-CCNC: 13 U/L (ref 5–33)
ANION GAP SERPL CALCULATED.3IONS-SCNC: 10 MMOL/L (ref 7–19)
AST SERPL-CCNC: 15 U/L (ref 5–32)
BASOPHILS ABSOLUTE: 0.1 K/UL (ref 0–0.2)
BASOPHILS RELATIVE PERCENT: 0.7 % (ref 0–1)
BILIRUB SERPL-MCNC: <0.2 MG/DL (ref 0.2–1.2)
BUN BLDV-MCNC: 5 MG/DL (ref 6–20)
CALCIUM SERPL-MCNC: 9.6 MG/DL (ref 8.6–10)
CHLORIDE BLD-SCNC: 98 MMOL/L (ref 98–111)
CO2: 27 MMOL/L (ref 22–29)
CREAT SERPL-MCNC: 0.7 MG/DL (ref 0.5–0.9)
EOSINOPHILS ABSOLUTE: 0.1 K/UL (ref 0–0.6)
EOSINOPHILS RELATIVE PERCENT: 1.3 % (ref 0–5)
GFR AFRICAN AMERICAN: >59
GFR NON-AFRICAN AMERICAN: >60
GLUCOSE BLD-MCNC: 104 MG/DL (ref 70–99)
GLUCOSE BLD-MCNC: 88 MG/DL (ref 74–109)
HCG QUALITATIVE: NEGATIVE
HCT VFR BLD CALC: 37.1 % (ref 37–47)
HEMOGLOBIN: 11.9 G/DL (ref 12–16)
IMMATURE GRANULOCYTES #: 0 K/UL
LYMPHOCYTES ABSOLUTE: 2.1 K/UL (ref 1.1–4.5)
LYMPHOCYTES RELATIVE PERCENT: 23.8 % (ref 20–40)
MAGNESIUM: 2.1 MG/DL (ref 1.6–2.6)
MCH RBC QN AUTO: 29.8 PG (ref 27–31)
MCHC RBC AUTO-ENTMCNC: 32.1 G/DL (ref 33–37)
MCV RBC AUTO: 93 FL (ref 81–99)
MONOCYTES ABSOLUTE: 0.5 K/UL (ref 0–0.9)
MONOCYTES RELATIVE PERCENT: 5.5 % (ref 0–10)
NEUTROPHILS ABSOLUTE: 5.9 K/UL (ref 1.5–7.5)
NEUTROPHILS RELATIVE PERCENT: 68.5 % (ref 50–65)
PDW BLD-RTO: 13.1 % (ref 11.5–14.5)
PERFORMED ON: ABNORMAL
PLATELET # BLD: 311 K/UL (ref 130–400)
PMV BLD AUTO: 10.3 FL (ref 9.4–12.3)
POTASSIUM SERPL-SCNC: 3.9 MMOL/L (ref 3.5–5)
RBC # BLD: 3.99 M/UL (ref 4.2–5.4)
SODIUM BLD-SCNC: 135 MMOL/L (ref 136–145)
TOTAL PROTEIN: 7.8 G/DL (ref 6.6–8.7)
WBC # BLD: 8.7 K/UL (ref 4.8–10.8)

## 2021-12-19 PROCEDURE — 6360000004 HC RX CONTRAST MEDICATION: Performed by: EMERGENCY MEDICINE

## 2021-12-19 PROCEDURE — 84703 CHORIONIC GONADOTROPIN ASSAY: CPT

## 2021-12-19 PROCEDURE — 85025 COMPLETE CBC W/AUTO DIFF WBC: CPT

## 2021-12-19 PROCEDURE — 36415 COLL VENOUS BLD VENIPUNCTURE: CPT

## 2021-12-19 PROCEDURE — 82947 ASSAY GLUCOSE BLOOD QUANT: CPT

## 2021-12-19 PROCEDURE — 99283 EMERGENCY DEPT VISIT LOW MDM: CPT

## 2021-12-19 PROCEDURE — 70498 CT ANGIOGRAPHY NECK: CPT

## 2021-12-19 PROCEDURE — 70450 CT HEAD/BRAIN W/O DYE: CPT

## 2021-12-19 PROCEDURE — 83735 ASSAY OF MAGNESIUM: CPT

## 2021-12-19 PROCEDURE — 70496 CT ANGIOGRAPHY HEAD: CPT

## 2021-12-19 PROCEDURE — 80053 COMPREHEN METABOLIC PANEL: CPT

## 2021-12-19 PROCEDURE — 93005 ELECTROCARDIOGRAM TRACING: CPT | Performed by: EMERGENCY MEDICINE

## 2021-12-19 RX ADMIN — IOPAMIDOL 90 ML: 755 INJECTION, SOLUTION INTRAVENOUS at 16:39

## 2021-12-19 ASSESSMENT — ENCOUNTER SYMPTOMS
SHORTNESS OF BREATH: 0
COUGH: 0
ABDOMINAL PAIN: 0
RHINORRHEA: 0
SORE THROAT: 0
DIARRHEA: 0
BACK PAIN: 0
VOMITING: 0
NAUSEA: 0

## 2021-12-19 NOTE — ED NOTES
Pt experienced R sided weakness with aphasia, onset 1415. S/s have resolved upon arrival.    Pt has hx of brain cx with craniotomy.       Makayla Mancuso RN  12/19/21 0397

## 2021-12-19 NOTE — ED PROVIDER NOTES
Diagnosis Date    Anaplastic glioma of brain ([facility]     Anxiety 6/28/2017    Basal cell carcinoma     Dysthymia 6/28/2017    Seizure disorder ([facility] 6/28/2017         SURGICAL HISTORY       Past Surgical History:   Procedure Laterality Date    BREAST BIOPSY      benign    CRANIOTOMY      SKIN CANCER EXCISION           CURRENT MEDICATIONS     Current Discharge Medication List      CONTINUE these medications which have NOT CHANGED    Details   Cholecalciferol (VITAMIN D3) 50 MCG (2000 UT) CAPS Take by mouth daily      aspirin 81 MG tablet Take 81 mg by mouth daily      ferrous sulfate 325 (65 Fe) MG tablet Take 325 mg by mouth daily (with breakfast)      OXcarbazepine (TRILEPTAL) 300 MG tablet Take 600 mg by mouth 2 times daily 4 tablets 2x daily             ALLERGIES     Patient has no known allergies. FAMILY HISTORY       Family History   Problem Relation Age of Onset    Hypertension Mother     Esophageal Cancer Father     Breast Cancer Other     Ovarian Cancer Other           SOCIAL HISTORY       Social History     Socioeconomic History    Marital status:      Spouse name: None    Number of children: None    Years of education: None    Highest education level: None   Occupational History    None   Tobacco Use    Smoking status: Never Smoker    Smokeless tobacco: Never Used   Vaping Use    Vaping Use: Never used   Substance and Sexual Activity    Alcohol use: Never    Drug use: No    Sexual activity: None   Other Topics Concern    None   Social History Narrative    None     Social Determinants of Health     Financial Resource Strain: Low Risk     Difficulty of Paying Living Expenses: Not hard at all   Food Insecurity: No Food Insecurity    Worried About Running Out of Food in the Last Year: Never true    Rebecca of Food in the Last Year: Never true   Transportation Needs:     Lack of Transportation (Medical): Not on file    Lack of Transportation (Non-Medical):  Not on file Physical Activity:     Days of Exercise per Week: Not on file    Minutes of Exercise per Session: Not on file   Stress:     Feeling of Stress : Not on file   Social Connections:     Frequency of Communication with Friends and Family: Not on file    Frequency of Social Gatherings with Friends and Family: Not on file    Attends [employer] Services: Not on file    Active Member of [address] [place] [address] [employer] or Organizations: Not on file    Attends Club or Organization Meetings: Not on file    Marital Status: Not on file   Intimate Partner Violence:     Fear of Current or Ex-Partner: Not on file    Emotionally Abused: Not on file    Physically Abused: Not on file    Sexually Abused: Not on file   Housing Stability:     Unable to Pay for Housing in the Last Year: Not on file    Number of [place] in the Last Year: Not on file    Unstable Housing in the Last Year: Not on file       SCREENINGS             PHYSICAL EXAM    (up to 7 for level 4, 8 or more for level 5)     ED Triage Vitals [12/19/21 1457]   BP Temp Temp Source Pulse Resp SpO2 Height Weight   122/85 98.1 °F (36.7 °C) Oral 87 18 95 % 5' (1.524 m) 165 lb (74.8 kg)       Physical Exam  Vitals and nursing note reviewed. Constitutional:       General: She is not in acute distress. Appearance: Normal appearance. She is well-developed. She is not ill-appearing or diaphoretic. HENT:      Head: Normocephalic and atraumatic. Right Ear: External ear normal.      Left Ear: External ear normal.   Eyes:      Conjunctiva/sclera: Conjunctivae normal.   Neck:      Trachea: No tracheal deviation. Cardiovascular:      Rate and Rhythm: Normal rate and regular rhythm. Heart sounds: Normal heart sounds. No murmur heard. Pulmonary:      Effort: No respiratory distress. Breath sounds: Normal breath sounds. No wheezing or rales. Abdominal:      Palpations: Abdomen is soft. There is no mass. Tenderness: There is no abdominal tenderness. Musculoskeletal:         General: Normal range of motion. Cervical back: Normal range of motion. Skin:     General: Skin is warm and dry. Neurological:      General: No focal deficit present. Mental Status: She is alert and oriented to person, place, and time. GCS: GCS eye subscore is 4. GCS verbal subscore is 5. GCS motor subscore is 6. Cranial Nerves: No cranial nerve deficit, dysarthria or facial asymmetry. Sensory: No sensory deficit. Motor: No weakness or abnormal muscle tone. Coordination: Coordination normal. Finger-Nose-Finger Test normal.      Gait: Gait normal.         DIAGNOSTIC RESULTS     EKG: All EKG's areinterpreted by the Emergency Department Physician who either signs or Co-signs this chart in the absence of a cardiologist.    74 normal sinus rhythm no obvious ST changes nondiagnostic EKG    RADIOLOGY:  Non-plain film images such as CT, Ultrasound and MRI are read by the radiologist. Plain radiographic images are visualized and preliminarily interpreted bythe emergency physician with the below findings:      CT HEAD WO CONTRAST   Final Result   Impression:   Postsurgical changes in the right which are stable compared to the   prior exam. Low density area in the right corona radiata is also   stable from the prior exam. No acute intracranial findings.    Signed by Dr Nikkie Lincoln    (Results Pending)   CTA NECK W CONTRAST    (Results Pending)           LABS:  Labs Reviewed   CBC WITH AUTO DIFFERENTIAL - Abnormal; Notable for the following components:       Result Value    RBC 3.99 (*)     Hemoglobin 11.9 (*)     MCHC 32.1 (*)     Neutrophils % 68.5 (*)     All other components within normal limits   COMPREHENSIVE METABOLIC PANEL - Abnormal; Notable for the following components:    Sodium 135 (*)     BUN 5 (*)     All other components within normal limits   POCT GLUCOSE - Abnormal; Notable for the following components:    POC Glucose 104 (*)     All other components within normal limits   HCG, SERUM, QUALITATIVE   MAGNESIUM       All other labs were within normal range or not returned as of this dictation. EMERGENCY DEPARTMENT COURSE and DIFFERENTIAL DIAGNOSIS/MDM:   Vitals:    Vitals:    12/19/21 1457   BP: 122/85   Pulse: 87   Resp: 18   Temp: 98.1 °F (36.7 °C)   TempSrc: Oral   SpO2: 95%   Weight: 165 lb (74.8 kg)   Height: 5' (1.524 m)       MDM    Pt asymptomatic here, ct head stable, labs reassuring, unlikely TIA, d/w Dr. Poonam Roberts agrees likely seizure with possible speech arrest, request CTA given hx of radiation but no other obvious risk factors for atherosclerosis and have low suspicion. Will stop wellbutrin since can lower seizure threshold. Dr. Camacho Redo to follow up CTAs. Have discussed plan with patient and her . CONSULTS:  None    PROCEDURES:  Unless otherwise noted below, none     Procedures    FINAL IMPRESSION      1.  Seizure [facility])          DISPOSITION/PLAN   DISPOSITION        PATIENT REFERRED TO:  Zaheer Guzman MD  [address] [place] [address] [phone]    Call in 1 day        DISCHARGE MEDICATIONS:  Current Discharge Medication List             (Please note that portions of this note were completed with a voice recognition program.  Efforts were made to edit thedictations but occasionally words are mis-transcribed.)    Gennaro Najjar, MD (electronically signed)  Attending Emergency Physician        Mamie Billingsley MD  12/19/21 [phone]

## 2021-12-20 ENCOUNTER — TELEPHONE (OUTPATIENT)
Dept: INTERNAL MEDICINE | Age: 41
End: 2021-12-20

## 2021-12-20 DIAGNOSIS — C71.9 ANAPLASTIC GLIOMA OF BRAIN (HCC): ICD-10-CM

## 2021-12-20 DIAGNOSIS — G40.909 SEIZURE DISORDER (HCC): Primary | ICD-10-CM

## 2021-12-20 LAB
EKG P AXIS: 49 DEGREES
EKG P-R INTERVAL: 128 MS
EKG Q-T INTERVAL: 382 MS
EKG QRS DURATION: 94 MS
EKG QTC CALCULATION (BAZETT): 406 MS
EKG T AXIS: 27 DEGREES

## 2021-12-20 NOTE — TELEPHONE ENCOUNTER
Patient called and stated that she was seen in the ED yesterday for a seizure like activity and Dr. Estuardo Nichols was consulted and called our office today wanting to know if she needed to follow up here? Stated to patient that she would need a referral from her PCP - Dr. Alanna Brown. Patient stated that she has a call into Dr. Alanna Brown and also to her Neuro Oncologist at [facility]. Stated to patient that if she has a Neuro Oncologist at [facility] that she needed to follow up with them and her PCP to make plans for next steps.  sh

## 2021-12-20 NOTE — TELEPHONE ENCOUNTER
She went to the ER yesterday with slurred speech and would like to speak with Dr. Jaleesa Bennett about this when she gets a chance.

## 2021-12-21 NOTE — TELEPHONE ENCOUNTER
I called her and she had a smaller episode today - - not really like a seizure --   I told her to send a Alorum message to [facility] and to call them again in the AM   --- please call her late AM and be sure Yg Gray has scheduled apt and be sure apt with Dr Tres Pham gets scheduled

## 2021-12-22 ENCOUNTER — HOSPITAL ENCOUNTER (OUTPATIENT)
Dept: WOMENS IMAGING | Age: 41
Discharge: HOME | End: 2021-12-22
Payer: COMMERCIAL

## 2021-12-22 DIAGNOSIS — Z12.31 SCREENING MAMMOGRAM, ENCOUNTER FOR: ICD-10-CM

## 2021-12-22 PROCEDURE — 77063 BREAST TOMOSYNTHESIS BI: CPT

## 2022-01-20 DIAGNOSIS — E55.9 VITAMIN D DEFICIENCY: ICD-10-CM

## 2022-01-20 NOTE — TELEPHONE ENCOUNTER
Jo Ann Carmelo called to request a refill on her medication.       Last office visit : 11/18/2021   Next office visit : 5/19/2022     Requested Prescriptions     Pending Prescriptions Disp Refills    vitamin D (ERGOCALCIFEROL) 1.25 MG (50006 UT) CAPS capsule [Pharmacy Med Name: VITAMIN D2 1.25MG(50,000 UNIT)] 12 capsule 3     Sig: TAKE 1 CAPSULE BY MOUTH ONE TIME PER [address] [place], [place]

## 2022-01-21 RX ORDER — ERGOCALCIFEROL 1.25 MG/1
CAPSULE ORAL
Qty: 12 CAPSULE | Refills: 3 | Status: SHIPPED | OUTPATIENT
Start: 2022-01-21

## 2022-01-24 ENCOUNTER — OFFICE VISIT (OUTPATIENT)
Dept: ENT CLINIC | Age: 42
End: 2022-01-24
Payer: COMMERCIAL

## 2022-01-24 VITALS
DIASTOLIC BLOOD PRESSURE: 76 MMHG | BODY MASS INDEX: 31.15 KG/M2 | WEIGHT: 165 LBS | HEIGHT: 61 IN | SYSTOLIC BLOOD PRESSURE: 124 MMHG

## 2022-01-24 DIAGNOSIS — H61.21 IMPACTED CERUMEN OF RIGHT EAR: Primary | ICD-10-CM

## 2022-01-24 PROCEDURE — 69210 REMOVE IMPACTED EAR WAX UNI: CPT | Performed by: PHYSICIAN ASSISTANT

## 2022-01-24 NOTE — PROGRESS NOTES
Marleny Carrera is a pleasant 48-year-old  female that presents for a 3-month follow-up due to recurrent cerumen impactions to the right ear. 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 Ever since the radiation therapy, she has incurred loss of hearing as well as the recurrent cerumen impactions. She reports that currently she is having a new onset of seizures is currently being worked up at [facility]. She had a recent CT through the emergency room that did not demonstrate recurrence of her tumor. Physical examination with the microscope revealed the patient have a recurrent cerumen impaction to the right ear that was successfully removed with assistance of suction and alligator graspers. Examination of the left ear demonstrated normal TM and canal with no evidence of cerumen. Impression: Successful cerumen disimpaction of the right ear    Plan: I advised Marleny Carrera to follow with me in 3 months for a cerumen check. She is reminded to call if she has any questions or problems.       Electronically signed by Gulshan Osborne PA-C on 1/24/22 at 3:58 PM CST

## 2022-02-21 ENCOUNTER — OFFICE VISIT (OUTPATIENT)
Dept: NEUROLOGY | Age: 42
End: 2022-02-21
Payer: COMMERCIAL

## 2022-02-21 VITALS
DIASTOLIC BLOOD PRESSURE: 86 MMHG | BODY MASS INDEX: 31.72 KG/M2 | WEIGHT: 168 LBS | HEIGHT: 61 IN | HEART RATE: 89 BPM | SYSTOLIC BLOOD PRESSURE: 123 MMHG

## 2022-02-21 DIAGNOSIS — C71.0: Primary | ICD-10-CM

## 2022-02-21 DIAGNOSIS — G40.919 INTRACTABLE SEIZURES (HCC): ICD-10-CM

## 2022-02-21 PROCEDURE — 99203 OFFICE O/P NEW LOW 30 MIN: CPT | Performed by: PSYCHIATRY & NEUROLOGY

## 2022-02-21 RX ORDER — ALPRAZOLAM 0.25 MG/1
0.25 TABLET ORAL
COMMUNITY

## 2022-02-21 NOTE — PATIENT INSTRUCTIONS
INSTRUCTIONS:  1. Reduce the Trileptal to 600 mg in the am and 900 mg in the pm  2. No Wellbutrin or Tramadol  3. Will have you get an EEG  4. Will have you get a blood test to assure you sodium level is OK. No need to get this for a week as we are changing the Trileptal dose. 5. Legally OK to resume driving  6.  Call with any seizure

## 2022-02-21 NOTE — PROGRESS NOTES
[address]  [facility]  Phone [phone]  Fax [phone] PATIENT VISIT        Patient: Diamond Rivera  :  1980  Age:  42 y.o. MRN:  [id number]  Account #:  [de-identified]:  22    Referring Provider: Gerhard Poole MD  [address] [place] [address] [facility]  Consulting Provider: Raphael Mota M.D.    [address]:  Chief Complaint   Patient presents with    New Patient     Referred by Dr. Fatoumata Erickson for seizures and anaplastic glioma of brain        History Source: History obtained from the patient and chart. PCP: Gerhard Poole MD    HISTORY OF PRESENTILLNESS:   Diamond Rivera is a 42y.o. year old woman with a history of right hemisphere anaplastic astrocytoma and seizures who was referred for seizure management. She had a grand mal seizure 12 years ago and was diagnosed with a brain tumor. She had right temporal tumor resection at [facility] followed by radiation and chemotherapy. She was initially treated with Keppra but it caused irritability and she was changed to Trileptal.  She had been on 600 mg BID for years without a seizure. She continues to follow with oncology at [facility] and gets head MRIs about twice a year. Her father  from esophageal cancer a couple years. Her grandfather  last year and a short time later her grand mother. She had some reactive type depression and was put on Wellbutin. She had a head MRI in 2021 that showed post operative changes but no tumor recurrence. In 2021, she had 3 spells with two that occurred on the same day. These consisted of her being unable to talk. She had some left facial drop and her right arm went numb. These spells lasted about 10 minutes and resolved. She did not have impaired awareness and she was able to move and respond but not say anything. She was seen in the ER on .   I recommended to the ER doctor that she stop taking the Wellbutrin. Her Trileptal was increased to 900 mg BID. She has had some fatigue and drowsiness since then. She has not had any further spells. She did have a CTA head and neck in the ER that were normal and had another MRI head on 1/31/2022 that again shows no tumor recurrence. She does have chronic right tinnitus and hearing loss and follows with ENT. PAST MEDICAL HITORY:    Past Medical History:   Diagnosis Date    [facility] ([facility]     Anxiety 6/28/2017    Basal cell carcinoma     Dysthymia 6/28/2017    Seizure disorder ([facility] 6/28/2017       Past Surgical History:   Procedure Laterality Date    BREAST BIOPSY Left 2006    benign    BREAST BIOPSY Left 2010    benign    CRANIOTOMY      SKIN CANCER EXCISION         Recent Hospitalizations  · None    Significant Injuries  · None    Habits  Ashely Kang reports that she has never smoked. She has never used smokeless tobacco. She reports that she does not drink alcohol and does not use drugs. Current Outpatient Medications   Medication Sig Dispense Refill    ALPRAZolam (XANAX) 0.25 MG tablet Take 0.25 mg by mouth. Pt states she takes one before her MRI every 6 months      vitamin D (ERGOCALCIFEROL) 1.25 MG (24641 UT) CAPS capsule TAKE 1 CAPSULE BY MOUTH ONE TIME PER WEEK 12 capsule 3    Cholecalciferol (VITAMIN D3) 50 MCG (2000 UT) CAPS Take by mouth daily      aspirin 81 MG tablet Take 81 mg by mouth daily      ferrous sulfate 325 (65 Fe) MG tablet Take 325 mg by mouth daily (with breakfast)      OXcarbazepine (TRILEPTAL) 300 MG tablet Take 300 mg by mouth 3 tablets 2x daily       No current facility-administered medications for this visit.        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 REVIEW OF SYSTEMS:  Review of Systems   Constitutional: Negative for chills and fever. HENT: Positive for hearing loss and tinnitus. Eyes: Negative for photophobia and visual disturbance. Respiratory: Negative for cough and shortness of breath. Cardiovascular: Negative for chest pain and palpitations. Gastrointestinal: Negative for nausea and vomiting. Endocrine: Negative for polydipsia and polyuria. Genitourinary: Negative for frequency and urgency. Musculoskeletal: Negative for arthralgias and back pain. Skin: Negative for rash and wound. Allergic/Immunologic: Negative for environmental allergies and food allergies. Neurological: Positive for dizziness, seizures, facial asymmetry, speech difficulty and numbness. Negative for tremors, syncope, weakness, light-headedness and headaches. Hematological: Negative for adenopathy. Does not bruise/bleed easily. Psychiatric/Behavioral: Negative for dysphoric mood. The patient is not nervous/anxious. PHYSICAL EXAMINATION:  Vitals:  /86   Pulse 89   Ht 5' 1\" (1.549 m)   Wt 168 lb (76.2 kg)   BMI 31.74 kg/m²   General appearance:  Alert, well developed, well nourished, in no distress  HEENT:  normocephalic, atraumatic, sclera appear normal, no nasal abnormalities, no rhinorrhea, Ears appear normal, oral mucous membranes are moist without erythema, trachea midline, thyroid is normal, no lymphadenopathy or neck mass. Cardiovascular:  Regular rate and rhythm without murmer. No peripheral edema, No cyanosis or clubbing. No carotid bruits. Pulmonary:  Lungs are clear to auscultation. Breathing appears normal, good expansion, normal effort without use of accessory muscles  Musculoskeletal:  Joints are normal.  No splints, slings, or casts. Spine appears normal with normal posture and range of motion.   Integument:  No rash, erythema, or pallor  Psychiatric:  Mood, affect, and behavior appear normal      NEUROLOGIC EXAMINATION:  Neurologic Exam     Mental Status   Oriented to person, place, and time. Speech: speech is normal   Level of consciousness: alert  Able to name object. Able to repeat. Speech is fluent. Cranial Nerves     CN II   Visual fields full to confrontation. CN III, IV, VI   Pupils are equal, round, and reactive to light. Extraocular motions are normal.     CN VII   Facial expression full, symmetric.      CN VIII   Hearing: intact    CN IX, X   Palate: symmetric    CN XI   Right sternocleidomastoid strength: normal  Left sternocleidomastoid strength: normal  Right trapezius strength: normal  Left trapezius strength: normal    CN XII   Tongue: not atrophic  Fasciculations: absent  Tongue deviation: none    Motor Exam   Muscle bulk: normal  Overall muscle tone: normal  Right arm pronator drift: absent  Left arm pronator drift: absent    Strength   Right neck flexion: 5/5  Left neck flexion: 5/5  Right neck extension: 5/5  Left neck extension: 5/5  Right deltoid: 5/5  Left deltoid: 5/5  Right biceps: 5/5  Left biceps: 5/5  Right triceps: 5/5  Left triceps: 5/5  Right wrist flexion: 5/5  Left wrist flexion: 5/5  Right wrist extension: 5/5  Left wrist extension: 5/5  Right interossei: 5/5  Left interossei: 5/5  Right iliopsoas: 5/5  Left iliopsoas: 5/5  Right quadriceps: 5/5  Left quadriceps: 5/5  Right glutei: 5/5  Left glutei: 5/5  Right anterior tibial: 5/5  Left anterior tibial: 5/5  Right posterior tibial: 5/5  Left posterior tibial: 5/5  Right peroneal: 5/5  Left peroneal: 5/5  Right gastroc: 5/5  Left gastroc: 5/5    Sensory Exam   Light touch normal.   Vibration normal.   Pinprick normal.     Gait, Coordination, and Reflexes     Gait  Gait: normal    Coordination   Romberg: negative  Finger to nose coordination: normal  Tandem walking coordination: normal    Tremor   Resting tremor: absent  Intention tremor: absent  Action tremor: absent    Reflexes   Right brachioradialis: 2+  Left brachioradialis: 2+  Right biceps: 2+  Left biceps: 2+  Right triceps: 2+  Left triceps: 2+  Right patellar: 2+  Left patellar: 2+  Right achilles: 2+  Left achilles: 2+  Right plantar: normal  Left plantar: normal  Right Murphy: absent  Left Murphy: absent  Right ankle clonus: absent  Left ankle clonus: absent  Rapid alternating movements were normal.       ADDITIONAL REVIEW:  MRI brain with and without contrast    Anatomical Region Laterality Modality   Head and Neck  Magnetic Resonance       Impression  Performed by KATJA BUCKLEY677  Stable exam compared to at least 11/19/2019. Electronically signed by Al Bhatt. Orly Dale MD on 1/31/2022 9:07 PM    Narrative  Performed by [facility]      MRI BRAIN WITHOUT AND WITH CONTRAST     HISTORY: C71.9Glioma (CMS/HCC)     COMPARISON: Brain MRI 11/16/2021, 5/25/2021, 11/24/2020, 5/26/2020, 11/19/2019. TECHNIQUE: MRI of the brain was performed before and after the intravenous administration of contrast.  IV contrast type and amount are documented in the electronic medical record (eStar). FINDINGS:   The large right temporal resection cavity and the scattered areas of FLAIR hyperintense signal within the right cerebral hemisphere have not changed appreciably since at least 11/19/2019. There is no pathologic enhancement, new mass effect or new   T2/FLAIR hyperintensity. There is a persistent right mastoid effusion. IMPRESSION:    ICD-10-CM    1. Anaplastic astrocytoma of cerebral cortex ([address]  C71.0    2. Intractable seizures ([address]  G40.919    Her spells are typical for simple partial seizures with speech arrest primarily. They could have been related to Wellbutrin. She has had some side effects of the higher Trileptal dose. PLAN:  1. Reduce the Trileptal to 600 mg in the am and 900 mg in the pm  2. No Wellbutrin or Tramadol  3. Will have you get an EEG  4.  Will have you get a blood test to assure you sodium level is OK. No need to get this for a week as we are changing the Trileptal dose. 5. Legally OK to resume driving  6. Call with any seizure  7.  FU in 6 months    Elijah Lambert M.D.

## 2022-02-23 ASSESSMENT — ENCOUNTER SYMPTOMS
PHOTOPHOBIA: 0
SHORTNESS OF BREATH: 0
BACK PAIN: 0
VOMITING: 0
NAUSEA: 0
COUGH: 0

## 2022-03-01 ENCOUNTER — HOSPITAL ENCOUNTER (OUTPATIENT)
Dept: NEUROLOGY | Age: 42
Discharge: HOME | End: 2022-03-01
Payer: COMMERCIAL

## 2022-03-01 DIAGNOSIS — G40.919 INTRACTABLE SEIZURES (HCC): ICD-10-CM

## 2022-03-01 PROCEDURE — 95812 EEG 41-60 MINUTES: CPT

## 2022-03-01 PROCEDURE — 95812 EEG 41-60 MINUTES: CPT | Performed by: PSYCHIATRY & NEUROLOGY

## 2022-03-02 ENCOUNTER — TELEPHONE (OUTPATIENT)
Dept: NEUROLOGY | Age: 42
End: 2022-03-02

## 2022-03-02 DIAGNOSIS — G40.919 INTRACTABLE SEIZURES (HCC): ICD-10-CM

## 2022-03-02 LAB
ANION GAP SERPL CALCULATED.3IONS-SCNC: 8 MMOL/L (ref 7–19)
BUN BLDV-MCNC: 7 MG/DL (ref 6–20)
CALCIUM SERPL-MCNC: 9.5 MG/DL (ref 8.6–10)
CHLORIDE BLD-SCNC: 98 MMOL/L (ref 98–111)
CO2: 27 MMOL/L (ref 22–29)
CREAT SERPL-MCNC: 0.6 MG/DL (ref 0.5–0.9)
GFR AFRICAN AMERICAN: >59
GFR NON-AFRICAN AMERICAN: >60
GLUCOSE BLD-MCNC: 94 MG/DL (ref 74–109)
POTASSIUM SERPL-SCNC: 4.2 MMOL/L (ref 3.5–5)
SODIUM BLD-SCNC: 133 MMOL/L (ref 136–145)

## 2022-03-02 NOTE — TELEPHONE ENCOUNTER
----- Message from Gila Parker MD sent at 3/1/2022  9:41 PM CST -----  Let pt know that her EEG showed right temporal sharp waves with a high seizure potential.  See how she is doing on the lower Trileptal dose. Let her know to get a blood test to make sure her sodium level is OK since Trileptal can cause a low sodium which can cause fatigue and forgetfulness.   I put in an order for the blood test

## 2022-03-02 NOTE — TELEPHONE ENCOUNTER
Spoke to patient and gave EEG results, also instructed to have lab work done. She reports she is doing good on the new medication dose. She will have the lab work done.

## 2022-03-04 NOTE — TELEPHONE ENCOUNTER
Patient called regarding her lab work, per Dr Gustavo Becker note I let the patient know that her sodium was a bit low at 133, I stated that normal is 135-145 and per Dr Borja Retort she should feel free to salt her food liberally to keep that level up. Patient voiced understanding of results and instructions. She will call back if she needs anything further.         Result Notes     Gustabo Quarles MD   3/3/2022  1:01 AM CST Back to Top        Sodium is just a tad low at 133, normal is 135-145.  She can feel free to salt food liberally

## 2022-04-25 ENCOUNTER — OFFICE VISIT (OUTPATIENT)
Dept: ENT CLINIC | Age: 42
End: 2022-04-25
Payer: COMMERCIAL

## 2022-04-25 VITALS
SYSTOLIC BLOOD PRESSURE: 130 MMHG | BODY MASS INDEX: 31.72 KG/M2 | WEIGHT: 168 LBS | DIASTOLIC BLOOD PRESSURE: 72 MMHG | HEIGHT: 61 IN

## 2022-04-25 DIAGNOSIS — H61.23 BILATERAL IMPACTED CERUMEN: Primary | ICD-10-CM

## 2022-04-25 PROCEDURE — 99213 OFFICE O/P EST LOW 20 MIN: CPT | Performed by: PHYSICIAN ASSISTANT

## 2022-04-25 PROCEDURE — 69210 REMOVE IMPACTED EAR WAX UNI: CPT | Performed by: PHYSICIAN ASSISTANT

## 2022-04-25 NOTE — PROGRESS NOTES
Rich Yung is a pleasant 58-year-old  female that presents for a 3-month cerumen check. She has a history of having recurrent cerumen impactions due to radiation to the right side of the head for an anaplastic astrocytoma. Overall the patient reports this has been 12 years since the initial radiation therapy and she has been doing well. She currently is wearing a hearing aid and is quite happy with the improvement of hearing. Physical examination with the microscope revealed a recurrent cerumen impaction that was noted to the medial portion of the inner ear overall this was noted to be present from her previous exams and is felt to be from the radiation. This was successfully removed without any complications. Patient was also noted to have a cerumen impaction of the left ear that was also successfully removed. Neck exam demonstrated no lymphadenopathy or thyromegaly. Impression: Successful cerumen disimpaction bilaterally    Plan: Patient is to follow-up in 3 months for cerumen check. She was reminded to call if she has any questions or problems.       Electronically signed by Kamran Camp PA-C on 4/25/22 at 10:57 AM CDT

## 2022-07-25 ENCOUNTER — OFFICE VISIT (OUTPATIENT)
Dept: ENT CLINIC | Age: 42
End: 2022-07-25
Payer: COMMERCIAL

## 2022-07-25 VITALS
BODY MASS INDEX: 31.72 KG/M2 | SYSTOLIC BLOOD PRESSURE: 114 MMHG | HEIGHT: 61 IN | DIASTOLIC BLOOD PRESSURE: 66 MMHG | WEIGHT: 168 LBS

## 2022-07-25 DIAGNOSIS — H61.21 IMPACTED CERUMEN OF RIGHT EAR: Primary | ICD-10-CM

## 2022-07-25 PROCEDURE — 69210 REMOVE IMPACTED EAR WAX UNI: CPT | Performed by: PHYSICIAN ASSISTANT

## 2022-07-25 ASSESSMENT — ENCOUNTER SYMPTOMS
SORE THROAT: 0
EYE DISCHARGE: 0
SINUS PRESSURE: 0
VOICE CHANGE: 0
SINUS PAIN: 0
FACIAL SWELLING: 0
TROUBLE SWALLOWING: 0
EYE PAIN: 0
RHINORRHEA: 0

## 2022-07-25 NOTE — ASSESSMENT & PLAN NOTE
Cerumen impaction of the right ear-successfully removed with microscopic guidance  Plan: Patient is follow-up in 3 months for cerumen check. She was reminded to call if she has any questions or problems.

## 2022-07-25 NOTE — PROGRESS NOTES
[facility] OTOLARYNGOLOGY/ENT  Diane Sol is a pleasant 39-year-old  female that presents for a 3-month cerumen check. Patient has a history of recurrent cerumen impactions to the right ear. She has had prior radiation therapy to the right ear and right side of the brain due to a astrocytoma/glioma. She reports that her hearing aids are working quite well and she is experiencing no drainage from the ear canals. Allergies: Patient has no known allergies. Current Outpatient Medications   Medication Sig Dispense Refill    ALPRAZolam (XANAX) 0.25 MG tablet Take 0.25 mg by mouth. Pt states she takes one before her MRI every 6 months      vitamin D (ERGOCALCIFEROL) 1.25 MG (09077 UT) CAPS capsule TAKE 1 CAPSULE BY MOUTH ONE TIME PER WEEK 12 capsule 3    Cholecalciferol (VITAMIN D3) 50 MCG (2000 UT) CAPS Take by mouth daily      aspirin 81 MG tablet Take 81 mg by mouth daily      ferrous sulfate 325 (65 Fe) MG tablet Take 325 mg by mouth daily (with breakfast)      OXcarbazepine (TRILEPTAL) 300 MG tablet Take 300 mg by mouth 2 in the am and 3 q hs       No current facility-administered medications for this visit. Past Surgical History:   Procedure Laterality Date    BREAST BIOPSY Left 2006    benign    BREAST BIOPSY Left 2010    benign    CRANIOTOMY      SKIN CANCER EXCISION         Past Medical History:   Diagnosis Date    Anaplastic glioma of brain ([facility] [address]     Anxiety 6/28/2017    Basal cell carcinoma     Dysthymia 6/28/2017    Seizure disorder ([address] 6/28/2017       Family History   Problem Relation Age of Onset    Hypertension Mother     Esophageal Cancer Father     Ovarian Cancer Other     Breast Cancer Maternal Aunt 32       Social History     Tobacco Use    Smoking status: Never    Smokeless tobacco: Never   Substance Use Topics    Alcohol use: Never           REVIEW OF SYSTEMS:  all other systems reviewed and are negative  Review of Systems   Constitutional:  Negative for chills and fever. encounter. Electronically signed by Familia Soto PA-C on 7/25/22 at 11:10 AM CDT        Please note that this chart was generated using dragon dictation software. Although every effort was made to ensure the accuracy of this automated transcription, some errors in transcription may have occurred.

## 2022-07-26 ENCOUNTER — TELEPHONE (OUTPATIENT)
Dept: ENT CLINIC | Age: 42
End: 2022-07-26

## 2022-07-26 RX ORDER — MOMETASONE FUROATE 1 MG/G
CREAM TOPICAL
Qty: 15 G | Refills: 3 | Status: SHIPPED | OUTPATIENT
Start: 2022-07-26

## 2022-07-26 NOTE — PROGRESS NOTES
Patient requested Elocon cream for eczema of the ear canal  Prescription was sent to [facility] at [facility] signed by Familia Soto PA-C on 7/26/22 at 4:07 PM CDT

## 2022-08-22 ENCOUNTER — OFFICE VISIT (OUTPATIENT)
Dept: NEUROLOGY | Age: 42
End: 2022-08-22
Payer: COMMERCIAL

## 2022-08-22 VITALS
WEIGHT: 168 LBS | DIASTOLIC BLOOD PRESSURE: 82 MMHG | HEIGHT: 60 IN | BODY MASS INDEX: 32.98 KG/M2 | HEART RATE: 72 BPM | SYSTOLIC BLOOD PRESSURE: 125 MMHG

## 2022-08-22 DIAGNOSIS — G40.919 INTRACTABLE SEIZURES (HCC): Primary | ICD-10-CM

## 2022-08-22 DIAGNOSIS — C71.0: ICD-10-CM

## 2022-08-22 PROCEDURE — 99213 OFFICE O/P EST LOW 20 MIN: CPT | Performed by: PSYCHIATRY & NEUROLOGY

## 2022-08-22 NOTE — PROGRESS NOTES
[facility]  [address] [place] [address], [address]  [facility]  Phone [phone]  Fax [phone]     [facility] Follow Up Encounter  22 12:02 PM CDT    Information:   Patient Name: Albertina Cox  :   1980  Age:   36 y.o. MRN:   [id number]  Account #:  [de-identified]  Today:  22    Provider: Fransisco Morales M.D. Chief Complaint:   Chief Complaint   Patient presents with    Follow-up       Subjective:   Albertina Cox is a 36 y.o. woman with a history of right hemisphere anaplastic astrocytoma and seizures who is following up. She has had no further seizures. She tolerates the Trileptal 600/900. The evening dose does make her drowsy but not so much the am dose. She has had no headaches. She had another MRI head in May that was unchanged. Objective:     Past Medical History:  Past Medical History:   Diagnosis Date    Anaplastic glioma of brain ([facility] [address]     Anxiety 2017    Basal cell carcinoma     Dysthymia 2017    Seizure disorder ([facility] [address] 2017       Past Surgical History:   Procedure Laterality Date    BREAST BIOPSY Left 2006    benign    BREAST BIOPSY Left 2010    benign    CRANIOTOMY      SKIN CANCER EXCISION         Recent Hospitalizations  None    Significant Injuries  None    Habits  Virgil Rodriguez reports that she has never smoked. She has never used smokeless tobacco. She reports that she does not drink alcohol and does not use drugs. 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 Medications:  Current Outpatient Medications   Medication Sig Dispense Refill    mometasone (ELOCON) 0.1 % cream Apply small amount nightly to opening of external ear canal.  Continue to use nightly until symptoms of dryness and itching improve.  15 g 3 ALPRAZolam (XANAX) 0.25 MG tablet Take 0.25 mg by mouth. Pt states she takes one before her MRI every 6 months      vitamin D (ERGOCALCIFEROL) 1.25 MG (61815 UT) CAPS capsule TAKE 1 CAPSULE BY MOUTH ONE TIME PER WEEK 12 capsule 3    Cholecalciferol (VITAMIN D3) 50 MCG (2000 UT) CAPS Take by mouth daily      aspirin 81 MG tablet Take 81 mg by mouth daily      ferrous sulfate 325 (65 Fe) MG tablet Take 325 mg by mouth daily (with breakfast)      OXcarbazepine (TRILEPTAL) 300 MG tablet Take 300 mg by mouth 2 in the am and 3 q hs       No current facility-administered medications for this visit. Allergies:   Allergies as of 08/22/2022    (No Known Allergies)       Review of Systems:  Constitutional: negative for - chills and fever  Eyes:  negative for - visual disturbance and photophobia  HENMT: negative for - headaches and sinus pain  Respiratory: negative for - cough, hemoptysis, and shortness of breath  Cardiovascular: negative for - chest pain and palpitations  Gastrointestinal: negative for - blood in stools, constipation, diarrhea, nausea, and vomiting  Genito-Urinary: negative for - hematuria, urinary frequency, urinary urgency, and urinary retention  Musculoskeletal: negative for - joint pain, joint stiffness, and joint swelling  Hematological and Lymphatic: negative for - bleeding problems, abnormal bruising, and swollen lymph nodes  Endocrine:  negative for - polydipsia and polyphagia  Allergy/Immunology:  negative for - rhinorrhea, sinus congestion, hives  Integument:  negative for - negative for - rash, change in moles, new or changing lesions  Psychological: negative for - anxiety and depression  Neurological: negative for - memory loss, numbness/tingling, and weakness     PHYSICAL EXAMINATION:  Vitals:  /82   Pulse 72   Ht 5' (1.524 m)   Wt 168 lb (76.2 kg)   BMI 32.81 kg/m²   General appearance:  Alert, well developed, well nourished, in no distress  HEENT:  normocephalic, atraumatic, sclera appear normal, no nasal abnormalities, no rhinorrhea, Ears appear normal, oral mucous membranes are moist without erythema, trachea midline, thyroid is normal, no lymphadenopathy or neck mass. Cardiovascular:  Regular rate and rhythm without murmer. No peripheral edema, No cyanosis or clubbing. No carotid bruits. Pulmonary:  Lungs are clear to auscultation. Breathing appears normal, good expansion, normal effort without use of accessory muscles  Musculoskeletal:  Joints are normal.  No splints, slings, or casts. Spine appears normal with normal posture and range of motion. Integument:  No rash, erythema, or pallor  Psychiatric:  Mood, affect, and behavior appear normal      NEUROLOGIC EXAMINATION:  Mental Status:  alert, oriented to person, place, and time. Speech:  Clear without dysarthria or dysphonia  Language:  Fluent without aphasia  Cranial Nerves:   II Visual fields are full to confrontation   III,IV, VI Extraocular movements are full   VII Facial movements are symmetrical without weakness   VIII Hearing is intact   IX,X Shoulder shrug and head rotation strength are intact   XII No tongue atrophy or fasciculations. Normal tongue protrusion. No tongue weakness  Motor:  Normal strength in both upper and lower extremities. Normal muscle tone and bulk. Deep tendon reflexes are intact and symmetrical in both upper and lower extremities. Murphy's signs are absent bilaterally. There is no ankle clonus on either side. Sensation:  Sensation is intact to light touch, temperature, and vibration in all extremities. Coordination:  Rapid alternating movements are normal in both upper and lower extremities. Finger to nose testing is unimpaired bilaterally. Gait:  Normal station and gait. Pertinent Diagnostic Studies:  EEG showed right temporal individual sharp waves, T6 phase reversing potentials.     MRI brain with and without contrast    Anatomical Region Laterality Modality   Head and Neck -- Magnetic Resonance     Impression      Postoperative changes. No change comparison with the previous study. Electronically signed by  Nathalie Elizalde MD on 5/17/2022 11:36 AM  Narrative        MR BRAIN WITHOUT AND WITH CONTRAST     HISTORY: C71.9Glioma (CMS/HCC)     COMPARISON: 1/31/2022     TECHNIQUE:  MR imaging is performed through the brain, with multiple planes and sequences,  before and after IV administration of Gadavist. The volume of contrast administered is available in the patient's medical record. FINDINGS:     Postoperative changes seen in the right temporal lobe. No nodules or areas of suspicious enhancement are seen in the surgical bed. Surrounding T2 hyperintensity is unchanged in comparison with the previous study. The rounded area of increased FLAIR   signal intensity in the right corona radiata along with other areas of white matter T2 hyperintensity are also unchanged from the previous study. No mass, mass effect, midline shift or edema is seen. There are no areas of suspicious enhancement. There is no restricted diffusion. Right mastoid hyperintensity persists. Assessment:       ICD-10-CM    1. Intractable seizures ([address]  G40.919       2. 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 I discussed the option of reducing her Trileptal back to 600 mg BID but as she is doing well and there is some risk in lowering the dose, I did not recommend that at this time. Plan:   1. Note to Dr. Igor Daniel at [facility]  2. Call with any seizure  3. Continue Trileptal 600/900  4.  FU in 6 months    Electronically signed by Delta Lee MD on 8/22/22

## 2022-10-18 ENCOUNTER — OFFICE VISIT (OUTPATIENT)
Dept: INTERNAL MEDICINE | Age: 42
End: 2022-10-18
Payer: COMMERCIAL

## 2022-10-18 VITALS
OXYGEN SATURATION: 97 % | HEIGHT: 61 IN | DIASTOLIC BLOOD PRESSURE: 70 MMHG | HEART RATE: 82 BPM | BODY MASS INDEX: 32.47 KG/M2 | WEIGHT: 172 LBS | SYSTOLIC BLOOD PRESSURE: 110 MMHG

## 2022-10-18 DIAGNOSIS — D64.9 ANEMIA, UNSPECIFIED TYPE: ICD-10-CM

## 2022-10-18 DIAGNOSIS — Z13.220 SCREENING, LIPID: ICD-10-CM

## 2022-10-18 DIAGNOSIS — F34.1 DYSTHYMIA: ICD-10-CM

## 2022-10-18 DIAGNOSIS — C71.9 ANAPLASTIC GLIOMA OF BRAIN (HCC): Primary | ICD-10-CM

## 2022-10-18 DIAGNOSIS — Z13.1 SCREENING FOR DIABETES MELLITUS: ICD-10-CM

## 2022-10-18 DIAGNOSIS — E55.9 VITAMIN D DEFICIENCY: ICD-10-CM

## 2022-10-18 DIAGNOSIS — F41.9 ANXIETY: ICD-10-CM

## 2022-10-18 DIAGNOSIS — Z12.31 SCREENING MAMMOGRAM FOR BREAST CANCER: ICD-10-CM

## 2022-10-18 DIAGNOSIS — G40.909 SEIZURE DISORDER (HCC): ICD-10-CM

## 2022-10-18 PROCEDURE — 99214 OFFICE O/P EST MOD 30 MIN: CPT | Performed by: INTERNAL MEDICINE

## 2022-10-18 SDOH — ECONOMIC STABILITY: FOOD INSECURITY: WITHIN THE PAST 12 MONTHS, THE FOOD YOU BOUGHT JUST DIDN'T LAST AND YOU DIDN'T HAVE MONEY TO GET MORE.: NEVER TRUE

## 2022-10-18 SDOH — ECONOMIC STABILITY: FOOD INSECURITY: WITHIN THE PAST 12 MONTHS, YOU WORRIED THAT YOUR FOOD WOULD RUN OUT BEFORE YOU GOT MONEY TO BUY MORE.: NEVER TRUE

## 2022-10-18 ASSESSMENT — PATIENT HEALTH QUESTIONNAIRE - PHQ9
4. FEELING TIRED OR HAVING LITTLE ENERGY: 1
1. LITTLE INTEREST OR PLEASURE IN DOING THINGS: 0
SUM OF ALL RESPONSES TO PHQ QUESTIONS 1-9: 3
5. POOR APPETITE OR OVEREATING: 1
10. IF YOU CHECKED OFF ANY PROBLEMS, HOW DIFFICULT HAVE THESE PROBLEMS MADE IT FOR YOU TO DO YOUR WORK, TAKE CARE OF THINGS AT HOME, OR GET ALONG WITH OTHER PEOPLE: 0
SUM OF ALL RESPONSES TO PHQ QUESTIONS 1-9: 3
8. MOVING OR SPEAKING SO SLOWLY THAT OTHER PEOPLE COULD HAVE NOTICED. OR THE OPPOSITE, BEING SO FIGETY OR RESTLESS THAT YOU HAVE BEEN MOVING AROUND A LOT MORE THAN USUAL: 0
3. TROUBLE FALLING OR STAYING ASLEEP: 1
9. THOUGHTS THAT YOU WOULD BE BETTER OFF DEAD, OR OF HURTING YOURSELF: 0
SUM OF ALL RESPONSES TO PHQ QUESTIONS 1-9: 3
SUM OF ALL RESPONSES TO PHQ QUESTIONS 1-9: 3
2. FEELING DOWN, DEPRESSED OR HOPELESS: 0
6. FEELING BAD ABOUT YOURSELF - OR THAT YOU ARE A FAILURE OR HAVE LET YOURSELF OR YOUR FAMILY DOWN: 0
SUM OF ALL RESPONSES TO PHQ9 QUESTIONS 1 & 2: 0
7. TROUBLE CONCENTRATING ON THINGS, SUCH AS READING THE NEWSPAPER OR WATCHING TELEVISION: 0

## 2022-10-18 ASSESSMENT — SOCIAL DETERMINANTS OF HEALTH (SDOH): HOW HARD IS IT FOR YOU TO PAY FOR THE VERY BASICS LIKE FOOD, HOUSING, MEDICAL CARE, AND HEATING?: NOT VERY HARD

## 2022-10-18 NOTE — PROGRESS NOTES
Chief Complaint   Patient presents with    6 Month Follow-Up    Anxiety       HPI: Patient is here today to follow-up anxiety history of anaplastic glioma of the brain seizure disorder and hearing loss related to that. Patient reports she did live at [facility] [place] and there have been reports of contaminated water there she grew up with her father working there. Pt lived at [facility] and lived there until [facility] (except for 3 years).   Grief sadness her niece passed away sudden death teenager unusual scenario very sad patient is handling this but has had a lot of tragedy and grief over the last several years    Past Medical History:   Diagnosis Date    Anaplastic glioma of brain ([facility] [address]     Anxiety 6/28/2017    Basal cell carcinoma     Dysthymia 6/28/2017    Seizure disorder ([facility] [address] 6/28/2017       Past Surgical History:   Procedure Laterality Date    BREAST BIOPSY Left 2006    benign    BREAST BIOPSY Left 2010    benign    CRANIOTOMY      SKIN CANCER EXCISION         Family History   Problem Relation Age of Onset    Hypertension Mother     Esophageal Cancer Father     Ovarian Cancer Other     Breast Cancer Maternal Aunt 32       Social History     Socioeconomic History    Marital status:      Spouse name: Not on file    Number of children: Not on file    Years of education: Not on file    Highest education level: Not on file   Occupational History    Not on file   Tobacco Use    Smoking status: Never    Smokeless tobacco: Never   Vaping Use    Vaping Use: Never used   Substance and Sexual Activity    Alcohol use: Never    Drug use: No    Sexual activity: Not on file   Other Topics Concern    Not on file   Social History Narrative    Not on file     Social Determinants of Health     Financial Resource Strain: Low Risk     Difficulty of Paying Living Expenses: Not very hard   Food Insecurity: No Food Insecurity    Worried About Running Out of Food in the Last Year: Never true    [address] in the Last Year: Never true   Transportation Needs: Not on file   Physical Activity: Not on file   Stress: Not on file   Social Connections: Not on file   Intimate Partner Violence: Not on file   Housing Stability: Not on file       No Known Allergies    Current Outpatient Medications   Medication Sig Dispense Refill    mometasone (ELOCON) 0.1 % cream Apply small amount nightly to opening of external ear canal.  Continue to use nightly until symptoms of dryness and itching improve. 15 g 3    ALPRAZolam (XANAX) 0.25 MG tablet Take 0.25 mg by mouth. Pt states she takes one before her MRI every 6 months      vitamin D (ERGOCALCIFEROL) 1.25 MG (11086 UT) CAPS capsule TAKE 1 CAPSULE BY MOUTH ONE TIME PER WEEK 12 capsule 3    Cholecalciferol (VITAMIN D3) 50 MCG (2000 UT) CAPS Take by mouth daily      aspirin 81 MG tablet Take 81 mg by mouth daily      ferrous sulfate 325 (65 Fe) MG tablet Take 325 mg by mouth daily (with breakfast)      OXcarbazepine (TRILEPTAL) 300 MG tablet Take 300 mg by mouth 2 in the am and 3 q hs       No current facility-administered medications for this visit.        Review of Systems    /70   Pulse 82   Ht 5' 1\" (1.549 m)   Wt 172 lb (78 kg)   SpO2 97%   BMI 32.50 kg/m²   BP Readings from Last 7 Encounters:   10/25/22 126/74   10/18/22 110/70   08/22/22 125/82   07/25/22 114/66   04/25/22 130/72   02/21/22 123/86   01/24/22 124/76     Wt Readings from Last 7 Encounters:   10/25/22 172 lb (78 kg)   10/18/22 172 lb (78 kg)   08/22/22 168 lb (76.2 kg)   07/25/22 168 lb (76.2 kg)   04/25/22 168 lb (76.2 kg)   02/21/22 168 lb (76.2 kg)   01/24/22 165 lb (74.8 kg)     BMI Readings from Last 7 Encounters:   10/25/22 32.50 kg/m²   10/18/22 32.50 kg/m²   08/22/22 32.81 kg/m²   07/25/22 31.74 kg/m²   04/25/22 31.74 kg/m²   02/21/22 31.74 kg/m²   01/24/22 31.18 kg/m²     Resp Readings from Last 7 Encounters:   12/19/21 16   06/21/19 18   03/21/19 18   11/27/17 18       Physical Exam  Constitutional:       General: She is not in acute distress. HENT:      Head: Normocephalic. Eyes:      General: No scleral icterus. Cardiovascular:      Heart sounds: Normal heart sounds. Pulmonary:      Breath sounds: Normal breath sounds. Abdominal:      General: There is no distension. Tenderness: There is no abdominal tenderness. Musculoskeletal:      Cervical back: Neck supple. Right lower leg: No edema. Left lower leg: No edema. Lymphadenopathy:      Cervical: No cervical adenopathy. Skin:     Findings: No rash. Psychiatric:         Mood and Affect: Mood normal.       Results for orders placed or performed in visit on 61/05/48   Basic Metabolic Panel   Result Value Ref Range    Sodium 133 (L) 136 - 145 mmol/L    Potassium 4.2 3.5 - 5.0 mmol/L    Chloride 98 98 - 111 mmol/L    CO2 27 22 - 29 mmol/L    Anion Gap 8 7 - 19 mmol/L    Glucose 94 74 - 109 mg/dL    BUN 7 6 - 20 mg/dL    Creatinine 0.6 0.5 - 0.9 mg/dL    GFR Non-African American >60 >60    GFR African American >59 >59    Calcium 9.5 8.6 - 10.0 mg/dL       ASSESSMENT/ PLAN:  1. Anaplastic glioma of brain [facility])  We reviewed her record she follows at [facility] keep up-to-date with follow-up there she had a seizure since I last saw her Wellbutrin was discontinued had done okay with that despite history of seizure in the past but with recurrent seizure he was stopped she does not want any other sort of antidepressant therapy     2. Seizure disorder [facility])  recent seizure Wellbutrin stopped following closely with neurology at [facility] -medications adjusted on Trileptal     3. Anemia, unspecified type  check labs and parameters for anemia and follow  - CBC; Future  - Comprehensive Metabolic Panel; Future  - TSH; Future  - Iron; Future  - Vitamin B12; Future  - Folate; Future    4. Anxiety  She is stable without routine medication knows to let us know she has grief and sadness but she is managing she does have supportive family    5.  Dysthymia  See above we have offered referrals to counseling. She will let us know    6. Vitamin D deficiency    - Vitamin D 25 Hydroxy; Future    7. Screening, lipid    - TSH; Future  - Lipid Panel; Future    8. Screening for diabetes mellitus    - Hemoglobin A1C; Future    9. Screening mammogram for breast cancer    - CHIKIS DIGITAL SCREEN W OR WO CAD BILATERAL; Future    Chart, medications, labs, vaccines reviewed. Keep up to date with routine care and follow up. Call with any problems or complaints. Keep up to date with routine screening recomendations and vaccines.

## 2022-10-25 ENCOUNTER — OFFICE VISIT (OUTPATIENT)
Dept: ENT CLINIC | Age: 42
End: 2022-10-25
Payer: COMMERCIAL

## 2022-10-25 VITALS
SYSTOLIC BLOOD PRESSURE: 126 MMHG | DIASTOLIC BLOOD PRESSURE: 74 MMHG | HEIGHT: 61 IN | WEIGHT: 172 LBS | BODY MASS INDEX: 32.47 KG/M2

## 2022-10-25 DIAGNOSIS — H61.23 BILATERAL IMPACTED CERUMEN: Primary | ICD-10-CM

## 2022-10-25 PROCEDURE — 69210 REMOVE IMPACTED EAR WAX UNI: CPT | Performed by: PHYSICIAN ASSISTANT

## 2022-10-25 PROCEDURE — 99213 OFFICE O/P EST LOW 20 MIN: CPT | Performed by: PHYSICIAN ASSISTANT

## 2022-10-25 NOTE — PROGRESS NOTES
Rene Rios is a pleasant 44-year-old  female who presents for a 3-month follow-up for cerumen check. Patient reports that she has noticed no obvious hearing changes. She is noted with recurring cerumen impactions to the right ear due to previous radiation therapy that has resulted in scarring of the TM medially. Overall medically she has been doing quite well. Physical examination with the microscope confirmed a cerumen impaction to the right ear. This was removed with alligator graspers and suction. Patient was noted with scar tissue to the medial portion that remains stable with no evidence of perforation or infection. The left ear demonstrated a cerumen impaction that was also removed with alligator graspers and suction.-Please refer to separate dictated op note. Neck exam demonstrated no lymphadenopathy or thyromegaly. Impression: Successful cerumen disimpaction bilaterally    Plan: Patient is to follow-up in 3 months for routine cerumen check. She was reminded to call if she has any questions or problems.       Electronically signed by Ghazala Beth PA-C on 10/25/22 at 2:48 PM CDT

## 2022-11-01 ENCOUNTER — OFFICE VISIT (OUTPATIENT)
Age: 42
End: 2022-11-01
Payer: COMMERCIAL

## 2022-11-01 VITALS
BODY MASS INDEX: 34.36 KG/M2 | HEIGHT: 60 IN | RESPIRATION RATE: 18 BRPM | HEART RATE: 91 BPM | DIASTOLIC BLOOD PRESSURE: 74 MMHG | OXYGEN SATURATION: 96 % | WEIGHT: 175 LBS | TEMPERATURE: 98.3 F | SYSTOLIC BLOOD PRESSURE: 102 MMHG

## 2022-11-01 DIAGNOSIS — R09.89 CHEST CONGESTION: ICD-10-CM

## 2022-11-01 DIAGNOSIS — J10.1 INFLUENZA A: Primary | ICD-10-CM

## 2022-11-01 LAB
INFLUENZA A ANTIBODY: POSITIVE
INFLUENZA B ANTIBODY: NEGATIVE

## 2022-11-01 PROCEDURE — 87804 INFLUENZA ASSAY W/OPTIC: CPT | Performed by: NURSE PRACTITIONER

## 2022-11-01 PROCEDURE — 99213 OFFICE O/P EST LOW 20 MIN: CPT | Performed by: NURSE PRACTITIONER

## 2022-11-01 ASSESSMENT — ENCOUNTER SYMPTOMS
CHEST TIGHTNESS: 0
TROUBLE SWALLOWING: 0
STRIDOR: 0
WHEEZING: 0
ABDOMINAL DISTENTION: 0
COLOR CHANGE: 0
EYE PAIN: 0
ABDOMINAL PAIN: 0
SHORTNESS OF BREATH: 0
EYE DISCHARGE: 0
COUGH: 1
SINUS PRESSURE: 0
SORE THROAT: 0

## 2022-11-01 NOTE — PROGRESS NOTES
[id number]  [address] [facility] [address] [address]  Dept: [phone]  Dept Fax: [phone]  Loc: [phone]    Karley Storm is a 36 y.o. female who presents today for her medical conditions/complaints as noted below. Karley Storm is complaining of Cough and Congestion (CHEST/)        HPI:   Cough  Pertinent negatives include no chest pain, chills, fever, rash, sore throat, shortness of breath or wheezing. Aby Clay presents to the office complaining of cough and congestion that started yesterday. Patient has been exposed to the flu. Denies fever. Past Medical History:   Diagnosis Date    Anaplastic glioma of brain ([facility] [address]     Anxiety 6/28/2017    Basal cell carcinoma     Dysthymia 6/28/2017    Seizure disorder ([facility] [address] 6/28/2017       Past Surgical History:   Procedure Laterality Date    BREAST BIOPSY Left 2006    benign    BREAST BIOPSY Left 2010    benign    CRANIOTOMY      SKIN CANCER EXCISION         Family History   Problem Relation Age of Onset    Hypertension Mother     Esophageal Cancer Father     Ovarian Cancer Other     Breast Cancer Maternal Aunt 32       Social History     Tobacco Use    Smoking status: Never    Smokeless tobacco: Never   Substance Use Topics    Alcohol use: Never        Current Outpatient Medications   Medication Sig Dispense Refill    mometasone (ELOCON) 0.1 % cream Apply small amount nightly to opening of external ear canal.  Continue to use nightly until symptoms of dryness and itching improve. 15 g 3    ALPRAZolam (XANAX) 0.25 MG tablet Take 0.25 mg by mouth.  Pt states she takes one before her MRI every 6 months      vitamin D (ERGOCALCIFEROL) 1.25 MG (50000 UT) CAPS capsule TAKE 1 CAPSULE BY MOUTH ONE TIME PER WEEK 12 capsule 3    Cholecalciferol (VITAMIN D3) 50 MCG (2000 UT) CAPS Take by mouth daily      aspirin 81 MG tablet Take 81 mg by mouth daily      ferrous sulfate 325 (65 Fe) MG tablet Take 325 mg by mouth daily (with breakfast)      OXcarbazepine (TRILEPTAL) 300 MG tablet Take 300 mg by mouth 2 in the am and 3 q hs       No current facility-administered medications for this visit. No Known Allergies    Health Maintenance   Topic Date Due    Hepatitis C screen  Never done    COVID-19 Vaccine (3 - Booster for Pfizer series) 01/12/2022    Flu vaccine (1) 08/01/2022    HIV screen  10/18/2023 (Originally 3/27/1995)    Depression Monitoring  10/18/2023    Breast cancer screen  12/22/2023    Cervical cancer screen  05/28/2024    Lipids  11/11/2026    DTaP/Tdap/Td vaccine (2 - Td or Tdap) 09/05/2028    Hepatitis A vaccine  Aged Out    Hib vaccine  Aged Out    Meningococcal (ACWY) vaccine  Aged Out    Pneumococcal 0-64 years Vaccine  Aged Out       Subjective:   Review of Systems   Constitutional:  Negative for chills, fatigue and fever. HENT:  Positive for congestion. Negative for sinus pressure, sore throat and trouble swallowing. Eyes:  Negative for pain and discharge. Respiratory:  Positive for cough. Negative for chest tightness, shortness of breath, wheezing and stridor. Cardiovascular:  Negative for chest pain and palpitations. Gastrointestinal:  Negative for abdominal distention and abdominal pain. Genitourinary:  Negative for difficulty urinating, dysuria and hematuria. Musculoskeletal:  Negative for arthralgias, neck pain and neck stiffness. Skin:  Negative for color change and rash. Neurological:  Negative for dizziness, syncope, speech difficulty, weakness and numbness. Psychiatric/Behavioral:  Negative for confusion and suicidal ideas. Objective    Physical Exam  Vitals and nursing note reviewed. Constitutional:       General: She is not in acute distress. Appearance: Normal appearance. HENT:      Head: Normocephalic.       Right Ear: Tympanic membrane, ear canal and external ear normal.      Left Ear: Tympanic membrane, ear canal and external ear normal. Nose: Nose normal. No congestion. Mouth/Throat:      Mouth: Mucous membranes are moist.      Pharynx: Oropharynx is clear. Posterior oropharyngeal erythema (mild) present. Eyes:      Conjunctiva/sclera: Conjunctivae normal.      Pupils: Pupils are equal, round, and reactive to light. Cardiovascular:      Rate and Rhythm: Normal rate and regular rhythm. Pulses: Normal pulses. Heart sounds: Normal heart sounds. No murmur heard. Pulmonary:      Effort: Pulmonary effort is normal. No respiratory distress. Breath sounds: Normal breath sounds. No stridor. No wheezing. Abdominal:      General: Abdomen is flat. Bowel sounds are normal. There is no distension. Tenderness: There is no abdominal tenderness. Musculoskeletal:         General: No swelling or deformity. Normal range of motion. Cervical back: Normal range of motion. No rigidity or tenderness. Skin:     General: Skin is warm and dry. Findings: No rash. Neurological:      General: No focal deficit present. Mental Status: She is alert and oriented to person, place, and time. Sensory: No sensory deficit. /74   Pulse 91   Temp 98.3 °F (36.8 °C) (Temporal)   Resp 18   Ht 5' (1.524 m)   Wt 175 lb (79.4 kg)   SpO2 96%   BMI 34.18 kg/m²     Assessment         Diagnosis Orders   1. Influenza A        2. Chest congestion  POCT Influenza A/B          Plan   Patient positive for Influenza A  2. Encourage fluids, tylenol/Motrin, and rest  3. No school/work for 7 days from symptom onset  4. Cool mist humidifier   5. Educated on common secondary infections  6. If high persistent fever, Shortness of breath, dehydration, or lethargy occurs go to ER    Parent/Patient verbalized understanding and agrees to treatment plan.      Orders Placed This Encounter   Procedures    POCT Influenza A/B       Results for orders placed or performed in visit on 11/01/22   POCT Influenza A/B   Result Value Ref Range Influenza A Ab POsitive     Influenza B Ab negative        No orders of the defined types were placed in this encounter. New Prescriptions    No medications on file        No follow-ups on file. Discussed use, benefits, and side effects of any prescribed medications. All patient questions were answered. Patient voiced understanding of care plan. Patient was given educational materials - see patient instructions below. Patient Instructions   Patient positive for Influenza A  2. Encourage fluids, tylenol/Motrin, and rest  3. No school/work for 7 days from symptom onset  4. Cool mist humidifier   5. Educated on common secondary infections  6. If high persistent fever, Shortness of breath, dehydration, or lethargy occurs go to ER    Parent/Patient verbalized understanding and agrees to treatment plan.        Electronically signed by GHISLAINE Dill CNP on 11/1/2022 at 4:42 PM

## 2022-11-01 NOTE — PATIENT INSTRUCTIONS
Patient positive for Influenza A  2. Encourage fluids, tylenol/Motrin, and rest  3. No school/work for 7 days from symptom onset  4. Cool mist humidifier   5. Educated on common secondary infections  6. If high persistent fever, Shortness of breath, dehydration, or lethargy occurs go to ER    Parent/Patient verbalized understanding and agrees to treatment plan.

## 2022-12-21 DIAGNOSIS — E55.9 VITAMIN D DEFICIENCY: ICD-10-CM

## 2022-12-21 RX ORDER — ERGOCALCIFEROL 1.25 MG/1
CAPSULE ORAL
Qty: 12 CAPSULE | Refills: 3 | Status: SHIPPED | OUTPATIENT
Start: 2022-12-21

## 2022-12-29 DIAGNOSIS — E55.9 VITAMIN D DEFICIENCY: ICD-10-CM

## 2022-12-29 DIAGNOSIS — Z13.220 SCREENING, LIPID: ICD-10-CM

## 2022-12-29 DIAGNOSIS — D64.9 ANEMIA, UNSPECIFIED TYPE: ICD-10-CM

## 2022-12-29 DIAGNOSIS — Z13.1 SCREENING FOR DIABETES MELLITUS: ICD-10-CM

## 2022-12-29 LAB
ALBUMIN SERPL-MCNC: 4.3 G/DL (ref 3.5–5.2)
ALP BLD-CCNC: 98 U/L (ref 35–104)
ALT SERPL-CCNC: 13 U/L (ref 5–33)
ANION GAP SERPL CALCULATED.3IONS-SCNC: 11 MMOL/L (ref 7–19)
AST SERPL-CCNC: 14 U/L (ref 5–32)
BILIRUB SERPL-MCNC: <0.2 MG/DL (ref 0.2–1.2)
BUN BLDV-MCNC: 10 MG/DL (ref 6–20)
CALCIUM SERPL-MCNC: 9.5 MG/DL (ref 8.6–10)
CHLORIDE BLD-SCNC: 96 MMOL/L (ref 98–111)
CHOLESTEROL, TOTAL: 165 MG/DL (ref 160–199)
CO2: 24 MMOL/L (ref 22–29)
CREAT SERPL-MCNC: 0.6 MG/DL (ref 0.5–0.9)
FOLATE: 9.8 NG/ML (ref 4.8–37.3)
GFR SERPL CREATININE-BSD FRML MDRD: >60 ML/MIN/{1.73_M2}
GLUCOSE BLD-MCNC: 82 MG/DL (ref 74–109)
HBA1C MFR BLD: 5.5 % (ref 4–6)
HCT VFR BLD CALC: 36.9 % (ref 37–47)
HDLC SERPL-MCNC: 61 MG/DL (ref 65–121)
HEMOGLOBIN: 12 G/DL (ref 12–16)
IRON: 87 UG/DL (ref 37–145)
LDL CHOLESTEROL CALCULATED: 86 MG/DL
MCH RBC QN AUTO: 29.9 PG (ref 27–31)
MCHC RBC AUTO-ENTMCNC: 32.5 G/DL (ref 33–37)
MCV RBC AUTO: 92 FL (ref 81–99)
PDW BLD-RTO: 13.2 % (ref 11.5–14.5)
PLATELET # BLD: 326 K/UL (ref 130–400)
PMV BLD AUTO: 10 FL (ref 9.4–12.3)
POTASSIUM SERPL-SCNC: 4.9 MMOL/L (ref 3.5–5)
RBC # BLD: 4.01 M/UL (ref 4.2–5.4)
SODIUM BLD-SCNC: 131 MMOL/L (ref 136–145)
TOTAL PROTEIN: 7 G/DL (ref 6.6–8.7)
TRIGL SERPL-MCNC: 88 MG/DL (ref 0–149)
TSH SERPL DL<=0.05 MIU/L-ACNC: 3.54 UIU/ML (ref 0.27–4.2)
VITAMIN B-12: 432 PG/ML (ref 211–946)
VITAMIN D 25-HYDROXY: >100 NG/ML
WBC # BLD: 8.1 K/UL (ref 4.8–10.8)

## 2022-12-29 RX ORDER — ERGOCALCIFEROL 1.25 MG/1
50000 CAPSULE ORAL
Qty: 12 CAPSULE | Refills: 3
Start: 2022-12-29

## 2023-01-13 ENCOUNTER — HOSPITAL ENCOUNTER (OUTPATIENT)
Dept: WOMENS IMAGING | Age: 43
Discharge: HOME | End: 2023-01-13
Payer: COMMERCIAL

## 2023-01-13 VITALS — WEIGHT: 176 LBS | BODY MASS INDEX: 34.37 KG/M2

## 2023-01-13 DIAGNOSIS — Z12.31 SCREENING MAMMOGRAM FOR BREAST CANCER: ICD-10-CM

## 2023-01-13 PROCEDURE — 77063 BREAST TOMOSYNTHESIS BI: CPT

## 2023-01-16 DIAGNOSIS — R92.2 BREAST DENSITY: Primary | ICD-10-CM

## 2023-01-20 ENCOUNTER — HOSPITAL ENCOUNTER (OUTPATIENT)
Dept: WOMENS IMAGING | Age: 43
Discharge: HOME | End: 2023-01-20
Payer: COMMERCIAL

## 2023-01-20 DIAGNOSIS — R92.2 BREAST DENSITY: ICD-10-CM

## 2023-01-20 PROCEDURE — 76642 ULTRASOUND BREAST LIMITED: CPT

## 2023-01-25 ENCOUNTER — OFFICE VISIT (OUTPATIENT)
Dept: ENT CLINIC | Age: 43
End: 2023-01-25
Payer: COMMERCIAL

## 2023-01-25 VITALS
WEIGHT: 179 LBS | HEIGHT: 60 IN | DIASTOLIC BLOOD PRESSURE: 72 MMHG | BODY MASS INDEX: 35.14 KG/M2 | SYSTOLIC BLOOD PRESSURE: 124 MMHG

## 2023-01-25 DIAGNOSIS — H61.21 IMPACTED CERUMEN OF RIGHT EAR: Primary | ICD-10-CM

## 2023-01-25 DIAGNOSIS — Z80.3 FH: BREAST CANCER IN RELATIVE WHEN <45 YEARS OLD: Primary | ICD-10-CM

## 2023-01-25 PROCEDURE — 69210 REMOVE IMPACTED EAR WAX UNI: CPT | Performed by: PHYSICIAN ASSISTANT

## 2023-01-25 NOTE — PROGRESS NOTES
Merari Mendez is a pleasant 25-year-old  female that presents for a 3-month cerumen check. Patient reports that she has noticed no new changes and has no complaints. Physical examination with the microscope confirmed a normal TM and canal to the left side. The right side demonstrated a cerumen impaction to be present. This was successfully removed with alligator graspers and suction.-Please refer to separate dictated procedure note  Neck exam demonstrated no lymphadenopathy or thyromegaly. Impression: Successful cerumen disimpaction of the right ear    Plan: Patient is follow-up in 3 months for reevaluation due to the scarring from her previous radiation. She was reminded to call if she has any questions or any problems.       Electronically signed by Willa Sena PA-C on 1/25/23 at 10:35 AM CST

## 2023-01-25 NOTE — PROGRESS NOTES
Call her and let her know benign cysts on ultrasound but lets have surgery office look over films and examin e her with her family hx

## 2023-02-07 ENCOUNTER — OFFICE VISIT (OUTPATIENT)
Dept: SURGERY | Age: 43
End: 2023-02-07
Payer: COMMERCIAL

## 2023-02-07 VITALS
HEART RATE: 76 BPM | HEIGHT: 60 IN | WEIGHT: 179.2 LBS | OXYGEN SATURATION: 100 % | BODY MASS INDEX: 35.18 KG/M2 | TEMPERATURE: 97.5 F

## 2023-02-07 DIAGNOSIS — Z80.3 FAMILY HISTORY OF MALIGNANT NEOPLASM OF BREAST: Primary | ICD-10-CM

## 2023-02-07 DIAGNOSIS — N60.01 SIMPLE CYST OF RIGHT BREAST: ICD-10-CM

## 2023-02-07 PROCEDURE — 99203 OFFICE O/P NEW LOW 30 MIN: CPT | Performed by: PHYSICIAN ASSISTANT

## 2023-02-07 NOTE — PROGRESS NOTES
Subjective:      Patient ID: Rehan Tariq is a 36 y.o. female. HPI  Mrs. Elizabeth Lambert presents to establish care. She has a recent mammogram and US noting numerous right simple cysts. 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 Rehan Tariq is a 36 y.o. 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/2020    Depression 10/13/2020    Basal cell carcinoma of right lower eyelid 02/11/2020    Tinnitus, right 12/05/2019    Tympanic membrane perforation, right 09/23/2019    Impacted cerumen of right ear 09/23/2019    Mixed conductive and sensorineural hearing loss of right ear with unrestricted hearing of left ear 09/23/2019    Vitamin D deficiency 09/04/2018    Anemia 03/01/2018    Yeast infection 10/19/2017    Dysthymia 06/28/2017    Anxiety 06/28/2017    Seizure disorder ([facility] [address] 06/28/2017    Anaplastic glioma of brain (HCC)      Current Outpatient Medications   Medication Sig Dispense Refill    aspirin 81 MG tablet Take 81 mg by mouth daily      ferrous sulfate 325 (65 Fe) MG tablet Take 325 mg by mouth daily (with breakfast)      OXcarbazepine (TRILEPTAL) 300 MG tablet Take 300 mg by mouth 2 in the am and 3 q hs      vitamin D (ERGOCALCIFEROL) 1.25 MG (07007 UT) CAPS capsule Take 1 capsule by mouth every 14 days (Patient not taking: No sig reported) 12 capsule 3    mometasone (ELOCON) 0.1 % cream Apply small amount nightly to opening of external ear canal.  Continue to use nightly until symptoms of dryness and itching improve. (Patient not taking: No sig reported) 15 g 3    ALPRAZolam (XANAX) 0.25 MG tablet Take 0.25 mg by mouth.  Pt states she takes one before her MRI every 6 months (Patient not taking: No sig reported)      Cholecalciferol (VITAMIN D3) 50 MCG (2000 UT) CAPS Take by mouth daily (Patient not taking: No sig reported) No current facility-administered medications for this visit. Allergies: Patient has no known allergies. Past Medical History:   Diagnosis Date    Anaplastic glioma of brain ([facility] [address]     Anxiety 6/28/2017    Basal cell carcinoma     Dysthymia 6/28/2017    Seizure disorder ([facility] [address] 6/28/2017     Past Surgical History:   Procedure Laterality Date    BREAST BIOPSY Left 2006    benign    BREAST BIOPSY Left 2010    benign    CRANIOTOMY      SKIN CANCER EXCISION       Family History   Problem Relation Age of Onset    Hypertension Mother     Esophageal Cancer Father     Breast Cancer Maternal Aunt 32    Brain Cancer Paternal Aunt     Ovarian Cancer Other      Social History     Tobacco Use    Smoking status: Never    Smokeless tobacco: Never   Substance Use Topics    Alcohol use: Never       Review of Systems    Objective:   Physical Exam  Constitutional:       Appearance: Normal appearance. Chest:   Breasts:     Right: Normal. No inverted nipple, mass or skin change. Left: Normal. No inverted nipple, mass or skin change. Skin:     General: Skin is warm and dry. Neurological:      General: No focal deficit present. Mental Status: She is alert and oriented to person, place, and time. Psychiatric:         Mood and Affect: Mood normal.         Behavior: Behavior normal.         Thought Content: Thought content normal.         Judgment: Judgment normal.       Assessment:       Diagnosis Orders   1. Family history of malignant neoplasm of breast  [facility] (2+79)      2. Simple cyst of right breast                Plan:      I reassured her the simple cysts were nothing to worry about. She meets NCCN and ASBS criteria for hereditary cancer genetic testing. We discussed the testing and what it means and how it can potentially impact her care and her family's health. She would like to proceed with testing.                 Allan Jasso PA-C

## 2023-02-20 ENCOUNTER — OFFICE VISIT (OUTPATIENT)
Dept: NEUROLOGY | Age: 43
End: 2023-02-20

## 2023-02-20 VITALS
BODY MASS INDEX: 35.53 KG/M2 | HEART RATE: 79 BPM | WEIGHT: 181 LBS | OXYGEN SATURATION: 97 % | HEIGHT: 60 IN | SYSTOLIC BLOOD PRESSURE: 120 MMHG | DIASTOLIC BLOOD PRESSURE: 83 MMHG

## 2023-02-20 DIAGNOSIS — C71.0: ICD-10-CM

## 2023-02-20 DIAGNOSIS — G40.919 INTRACTABLE SEIZURES (HCC): Primary | ICD-10-CM

## 2023-02-20 NOTE — PROGRESS NOTES
[facility]  [address] [place] [address] [address]  [place], [address]  Phone [phone]  Fax [phone]     [facility] Follow Up Encounter  23 10:42 AM CST    Information:   Patient Name: Javier Messina  :   1980  Age:   36 y.o. MRN:   [id number]  Account #:  [de-identified]  Today:  23    Provider: Cristi Chua M.D. Chief Complaint:   Chief Complaint   Patient presents with    Seizures     Pt here for 6 month follow up for intractable seizures. Subjective:   Javier Messina is a 36 y.o. woman with a history of right hemisphere anaplastic astrocytoma resection and seizures  who is following up. She has had no seizures. She takes Trileptal 600/900 and tolerates it well. She has had no significant headaches. She had an MRI head in January that showed no changes, no tumor recurrence. Objective:     Past Medical History:  Past Medical History:   Diagnosis Date    Anaplastic glioma of brain ([facility] [address]     Anxiety 2017    Basal cell carcinoma     Dysthymia 2017    Seizure disorder ([facility] [address] 2017       Past Surgical History:   Procedure Laterality Date    BREAST BIOPSY Left 2006    benign    BREAST BIOPSY Left 2010    benign    CRANIOTOMY      SKIN CANCER EXCISION         Recent Hospitalizations  None    Significant Injuries  None    Habits  Shae Art reports that she has never smoked. She has never used smokeless tobacco. She reports that she does not drink alcohol and does not use drugs. 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     Medications:  Current Outpatient Medications   Medication Sig Dispense Refill    aspirin 81 MG tablet Take 81 mg by mouth daily      ferrous sulfate 325 (65 Fe) MG tablet Take 325 mg by mouth daily (with breakfast)      OXcarbazepine (TRILEPTAL) 300 MG tablet Take 300 mg by mouth 2 in the am and 3 q hs       No current facility-administered medications for this visit. Allergies: Allergies as of 02/20/2023    (No Known Allergies)       Review of Systems:  Constitutional: negative for - chills and fever  Eyes:  negative for - visual disturbance and photophobia  HENMT: negative for - headaches and sinus pain  Respiratory: negative for - cough, hemoptysis, and shortness of breath  Cardiovascular: negative for - chest pain and palpitations  Gastrointestinal: negative for - blood in stools, constipation, diarrhea, nausea, and vomiting  Genito-Urinary: negative for - hematuria, urinary frequency, urinary urgency, and urinary retention  Musculoskeletal: negative for - joint pain, joint stiffness, and joint swelling  Hematological and Lymphatic: negative for - bleeding problems, abnormal bruising, and swollen lymph nodes  Endocrine:  negative for - polydipsia and polyphagia  Allergy/Immunology:  negative for - rhinorrhea, sinus congestion, hives  Integument:  negative for - negative for - rash, change in moles, new or changing lesions  Psychological: negative for - anxiety and depression  Neurological: negative for - memory loss, numbness/tingling, and weakness     PHYSICAL EXAMINATION:  Vitals:  /83   Pulse 79   Ht 5' (1.524 m)   Wt 181 lb (82.1 kg)   SpO2 97%   BMI 35.35 kg/m²   General appearance:  Alert, well developed, well nourished, in no distress  HEENT:  normocephalic, atraumatic, sclera appear normal, no nasal abnormalities, no rhinorrhea, Ears appear normal, oral mucous membranes are moist without erythema, trachea midline, thyroid is normal, no lymphadenopathy or neck mass. Cardiovascular:  Regular rate and rhythm without murmer. No peripheral edema, No cyanosis or clubbing. No carotid bruits. Pulmonary:  Lungs are clear to auscultation.   Breathing appears normal, good expansion, normal effort without use of accessory muscles  Musculoskeletal:  Joints are normal.  No splints, slings, or casts. Spine appears normal with normal posture and range of motion. Integument:  No rash, erythema, or pallor  Psychiatric:  Mood, affect, and behavior appear normal      NEUROLOGIC EXAMINATION:  Mental Status:  alert, oriented to person, place, and time. Speech:  Clear without dysarthria or dysphonia  Language:  Fluent without aphasia  Cranial Nerves:   II Visual fields are full to confrontation   III,IV, VI Extraocular movements are full   V Facial sensation is intact   VII Facial movements are symmetrical without weakness   VIII Hearing is intact   IX,X Shoulder shrug and head rotation strength are intact   XII No tongue atrophy or fasciculations. Normal tongue protrusion. No tongue weakness  Motor:  Normal strength in both upper and lower extremities. Normal muscle tone and bulk. Deep tendon reflexes are intact and symmetrical in both upper and lower extremities. Murphy's signs are absent bilaterally. There is no ankle clonus on either side. Toes are downgoing to plantar stimulation bilaterally. Sensation:  Sensation is intact to light touch, temperature, and vibration in all extremities. Coordination:  Rapid alternating movements are normal in both upper and lower extremities. Finger to nose testing is unimpaired bilaterally. Gait:  Normal station and gait. Tandum gait is normal.  Romberg is negative. Pertinent Diagnostic Studies:  MRI brain with and without contrast    Anatomical Region Laterality Modality   Head and Neck -- Magnetic Resonance     Impression    Continued stability dating back to at least 1/31/2022. Electronically signed by  RAKAN Caceres MD on 1/10/2023 10:04 AM  Narrative        MRI BRAIN WITHOUT AND WITH CONTRAST     HISTORY: C71.9Glioma (CMS/HCC)     COMPARISON: Multiple prior exams, the most recent 8/23/2022.  The oldest is a head CT from 2/6/2010 and a brain MRI from 2/6/2010. TECHNIQUE: MRI of the brain was performed before and after the intravenous administration of contrast.  IV contrast type and amount are documented in the electronic medical record ([employer]). FINDINGS:   The right temporal surgical cavity and adjacent FLAIR hyperintense signal has not changed dating back to at least 1/31/2022. The globular FLAIR hyperintense signal in the right posterior frontal periventricular corona radiata nor the more patchy right   frontal periventricular signal abnormality haven't changed. There is no pathologic enhancement associated with these findings. No new mass effect. Right mastoid air cell effusion is unchanged. Assessment:       ICD-10-CM    1. Intractable seizures ([facility] [address]  G40.919       2. Anaplastic astrocytoma of cerebral cortex (HCC)  C71.0       Clinically stable    Plan:   1. Call with any seizure  2. Continue Trileptal 600/900  3.          FU in 6 months    Electronically signed by Mindi Sanchez MD on 2/20/23

## 2023-03-23 ENCOUNTER — OFFICE VISIT (OUTPATIENT)
Dept: INTERNAL MEDICINE | Age: 43
End: 2023-03-23
Payer: COMMERCIAL

## 2023-03-23 VITALS
HEART RATE: 77 BPM | BODY MASS INDEX: 35.54 KG/M2 | DIASTOLIC BLOOD PRESSURE: 78 MMHG | OXYGEN SATURATION: 98 % | TEMPERATURE: 97.8 F | SYSTOLIC BLOOD PRESSURE: 122 MMHG | WEIGHT: 182 LBS

## 2023-03-23 DIAGNOSIS — R21 RASH: ICD-10-CM

## 2023-03-23 PROCEDURE — 99212 OFFICE O/P EST SF 10 MIN: CPT | Performed by: NURSE PRACTITIONER

## 2023-03-23 RX ORDER — ERGOCALCIFEROL 1.25 MG/1
CAPSULE ORAL
COMMUNITY
Start: 2023-03-19

## 2023-03-23 SDOH — ECONOMIC STABILITY: FOOD INSECURITY: WITHIN THE PAST 12 MONTHS, THE FOOD YOU BOUGHT JUST DIDN'T LAST AND YOU DIDN'T HAVE MONEY TO GET MORE.: NEVER TRUE

## 2023-03-23 SDOH — ECONOMIC STABILITY: FOOD INSECURITY: WITHIN THE PAST 12 MONTHS, YOU WORRIED THAT YOUR FOOD WOULD RUN OUT BEFORE YOU GOT MONEY TO BUY MORE.: NEVER TRUE

## 2023-03-23 SDOH — ECONOMIC STABILITY: INCOME INSECURITY: HOW HARD IS IT FOR YOU TO PAY FOR THE VERY BASICS LIKE FOOD, HOUSING, MEDICAL CARE, AND HEATING?: NOT HARD AT ALL

## 2023-03-23 SDOH — ECONOMIC STABILITY: TRANSPORTATION INSECURITY
IN THE PAST 12 MONTHS, HAS LACK OF TRANSPORTATION KEPT YOU FROM MEETINGS, WORK, OR FROM GETTING THINGS NEEDED FOR DAILY LIVING?: NO

## 2023-03-23 SDOH — ECONOMIC STABILITY: HOUSING INSECURITY
IN THE LAST 12 MONTHS, WAS THERE A TIME WHEN YOU DID NOT HAVE A STEADY PLACE TO SLEEP OR SLEPT IN A SHELTER (INCLUDING NOW)?: NO

## 2023-03-23 NOTE — PROGRESS NOTES
note is electronic transcription/translation of spoken language to printed texts. The electronic translation of spoken language may be erroneous, or at times,nonsensical words or phrases may be inadvertently transcribed.   Although I have reviewed the note for such errors, some may still exist.

## 2023-03-23 NOTE — PATIENT INSTRUCTIONS
Rash both axillae;  wash with dial soap twice daily and alfred hydrocortisone cream and mix with benadryl cream until it goes away

## 2023-03-31 ENCOUNTER — OFFICE VISIT (OUTPATIENT)
Dept: INTERNAL MEDICINE | Age: 43
End: 2023-03-31
Payer: COMMERCIAL

## 2023-03-31 VITALS
WEIGHT: 180 LBS | SYSTOLIC BLOOD PRESSURE: 128 MMHG | BODY MASS INDEX: 35.15 KG/M2 | HEART RATE: 97 BPM | DIASTOLIC BLOOD PRESSURE: 86 MMHG | TEMPERATURE: 97.2 F | OXYGEN SATURATION: 98 %

## 2023-03-31 DIAGNOSIS — H60.501 ACUTE OTITIS EXTERNA OF RIGHT EAR, UNSPECIFIED TYPE: ICD-10-CM

## 2023-03-31 PROCEDURE — 99214 OFFICE O/P EST MOD 30 MIN: CPT | Performed by: INTERNAL MEDICINE

## 2023-03-31 RX ORDER — AMOXICILLIN AND CLAVULANATE POTASSIUM 875; 125 MG/1; MG/1
1 TABLET, FILM COATED ORAL 2 TIMES DAILY
Qty: 20 TABLET | Refills: 0 | Status: SHIPPED | OUTPATIENT
Start: 2023-03-31 | End: 2023-04-10

## 2023-03-31 NOTE — PROGRESS NOTES
Comments: Otitis externa of the right ear       Assessment:      Diagnosis Orders   1. Acute otitis externa of right ear, unspecified type                 Plan:     Otitis externa of the right ear. We will start her on Cortisporin eardrops 2 drops 3 times daily times a week and Augmentin 875 twice daily x10 days. Scheduled appointment    No follow-ups on file. Patient given educational materials- see patient instructions. Discussed use, benefit, and side effects of prescribedmedications. All patient questions answered. Pt voiced understanding. Reviewedhealth maintenance. Instructed to continue current medications, diet and exercise. Patient agreed with treatment plan. **This report has been created usingvoice recognition software. It may contain minor errors which are inherent in voicerecognition technology. **    Electronically signed by Marlene Merchant MD on 3/31/2023 at 9:14 AM

## 2023-04-07 ENCOUNTER — PATIENT MESSAGE (OUTPATIENT)
Dept: INTERNAL MEDICINE | Age: 43
End: 2023-04-07

## 2023-04-07 RX ORDER — FLUCONAZOLE 150 MG/1
150 TABLET ORAL DAILY
Qty: 3 TABLET | Refills: 0 | Status: SHIPPED | OUTPATIENT
Start: 2023-04-07 | End: 2023-04-10

## 2023-04-07 NOTE — TELEPHONE ENCOUNTER
From: Karley Storm  To: Dr. Dunbar Simpler: 4/7/2023 7:49 AM CDT  Subject: Medication rquest    Please call me in something for a yeast infection. I am on an antibiotic. Thank you!

## 2023-04-20 ENCOUNTER — OFFICE VISIT (OUTPATIENT)
Dept: INTERNAL MEDICINE | Age: 43
End: 2023-04-20
Payer: COMMERCIAL

## 2023-04-20 VITALS
HEIGHT: 60 IN | HEART RATE: 77 BPM | SYSTOLIC BLOOD PRESSURE: 134 MMHG | OXYGEN SATURATION: 95 % | DIASTOLIC BLOOD PRESSURE: 76 MMHG | WEIGHT: 181 LBS | BODY MASS INDEX: 35.53 KG/M2

## 2023-04-20 DIAGNOSIS — G40.909 SEIZURE DISORDER (HCC): ICD-10-CM

## 2023-04-20 DIAGNOSIS — Z00.00 ANNUAL PHYSICAL EXAM: Primary | ICD-10-CM

## 2023-04-20 DIAGNOSIS — C71.9 ANAPLASTIC GLIOMA OF BRAIN (HCC): ICD-10-CM

## 2023-04-20 DIAGNOSIS — F34.1 PERSISTENT DEPRESSIVE DISORDER: ICD-10-CM

## 2023-04-20 DIAGNOSIS — Z12.4 PAP SMEAR FOR CERVICAL CANCER SCREENING: ICD-10-CM

## 2023-04-20 DIAGNOSIS — H90.71 MIXED CONDUCTIVE AND SENSORINEURAL HEARING LOSS OF RIGHT EAR WITH UNRESTRICTED HEARING OF LEFT EAR: ICD-10-CM

## 2023-04-20 DIAGNOSIS — D64.9 ANEMIA, UNSPECIFIED TYPE: ICD-10-CM

## 2023-04-20 PROCEDURE — 99396 PREV VISIT EST AGE 40-64: CPT | Performed by: INTERNAL MEDICINE

## 2023-04-25 ENCOUNTER — OFFICE VISIT (OUTPATIENT)
Dept: ENT CLINIC | Age: 43
End: 2023-04-25
Payer: COMMERCIAL

## 2023-04-25 VITALS
SYSTOLIC BLOOD PRESSURE: 122 MMHG | HEIGHT: 60 IN | BODY MASS INDEX: 35.34 KG/M2 | DIASTOLIC BLOOD PRESSURE: 66 MMHG | WEIGHT: 180 LBS

## 2023-04-25 DIAGNOSIS — H61.21 IMPACTED CERUMEN OF RIGHT EAR: Primary | ICD-10-CM

## 2023-04-25 LAB
HPV HR 12 DNA SPEC QL NAA+PROBE: NOT DETECTED
HPV16 DNA SPEC QL NAA+PROBE: NOT DETECTED
HPV16+18+H RISK 12 DNA SPEC-IMP: NORMAL
HPV18 DNA SPEC QL NAA+PROBE: NOT DETECTED

## 2023-04-25 PROCEDURE — 69210 REMOVE IMPACTED EAR WAX UNI: CPT | Performed by: PHYSICIAN ASSISTANT

## 2023-04-25 PROCEDURE — 99213 OFFICE O/P EST LOW 20 MIN: CPT | Performed by: PHYSICIAN ASSISTANT

## 2023-04-25 RX ORDER — OFLOXACIN 3 MG/ML
4 SOLUTION/ DROPS OPHTHALMIC 4 TIMES DAILY
Qty: 10 ML | Refills: 2 | Status: SHIPPED | OUTPATIENT
Start: 2023-04-25 | End: 2023-05-02

## 2023-04-25 NOTE — PROGRESS NOTES
Elyssa is a pleasant 56-year-old  female that presents for a 3-month cerumen check. Patient has a history of having a recent ear infection that was treated. She denies any current drainage from the ear or any pain. Physical examination with the microscope demonstrated normal TM and canal to the left ear. Right ear demonstrated a cerumen impaction to be present. This was successfully removed with alligator graspers and suction. After disimpaction the patient was noted to have evidence of otitis externa to be present. This was suction down to the TM without any complications. Neck exam demonstrated no lymphadenopathy. Impression: Successful cerumen disimpaction of the right ear, right-sided otitis externa    Plan: I will place the patient on Floxin eardrops for 7 days. She is to follow-up in 3 months for a cerumen check. She was reminded to call if she has any questions or problems.       Electronically signed by Ezequiel Reyna PA-C on 4/25/23 at 11:17 AM CDT

## 2023-04-30 ASSESSMENT — ENCOUNTER SYMPTOMS
COUGH: 0
SINUS PRESSURE: 0
SHORTNESS OF BREATH: 0
ABDOMINAL PAIN: 0
EYE REDNESS: 0
BACK PAIN: 0
ABDOMINAL DISTENTION: 0
EYE DISCHARGE: 0

## 2023-07-25 ENCOUNTER — OFFICE VISIT (OUTPATIENT)
Dept: ENT CLINIC | Age: 43
End: 2023-07-25
Payer: COMMERCIAL

## 2023-07-25 VITALS
HEIGHT: 60 IN | BODY MASS INDEX: 35.34 KG/M2 | DIASTOLIC BLOOD PRESSURE: 70 MMHG | WEIGHT: 180 LBS | SYSTOLIC BLOOD PRESSURE: 126 MMHG

## 2023-07-25 DIAGNOSIS — H61.21 IMPACTED CERUMEN OF RIGHT EAR: Primary | ICD-10-CM

## 2023-07-25 PROBLEM — H60.501 ACUTE OTITIS EXTERNA OF RIGHT EAR: Status: RESOLVED | Noted: 2023-03-31 | Resolved: 2023-07-25

## 2023-07-25 PROBLEM — H72.91 TYMPANIC MEMBRANE PERFORATION, RIGHT: Status: RESOLVED | Noted: 2019-09-23 | Resolved: 2023-07-25

## 2023-07-25 PROCEDURE — 99213 OFFICE O/P EST LOW 20 MIN: CPT | Performed by: PHYSICIAN ASSISTANT

## 2023-07-25 NOTE — PROGRESS NOTES
Apple Sanchez is a pleasant 58-year-old  female who presents for a follow-up cerumen check of the right ear. She has a history of radiation therapy to the right ear resulting in frequent cerumen impactions. She reports that her hearing aid seems to be working well with no major issues. Physical examination with the microscope confirmed a cerumen impaction to be present that is more medial of the TM. This was attempted for removal with alligator graspers and suction. I was able to move about half of the cerumen however the medial portion was noted to be plastered against the TM therefore elected to treat this with Debrox and follow-up in a couple weeks. The left ear demonstrated normal TM and canal.  Neck exam demonstrated no lymphadenopathy or thyromegaly. Impression: Unsuccessful cerumen disimpaction of the right ear    Plan: I recommended treating the right ear with Debrox 2-3 times per day. She is to follow-up in 2 weeks for reattempt of the cerumen to the right ear. She was reminded to call if she has any questions or problems.       Electronically signed by Mita Erickson PA-C on 7/25/23 at 11:17 AM MARELYT

## 2023-08-08 ENCOUNTER — OFFICE VISIT (OUTPATIENT)
Dept: ENT CLINIC | Age: 43
End: 2023-08-08
Payer: COMMERCIAL

## 2023-08-08 VITALS
HEIGHT: 60 IN | BODY MASS INDEX: 35.14 KG/M2 | WEIGHT: 179 LBS | SYSTOLIC BLOOD PRESSURE: 128 MMHG | DIASTOLIC BLOOD PRESSURE: 74 MMHG

## 2023-08-08 DIAGNOSIS — H61.21 IMPACTED CERUMEN OF RIGHT EAR: Primary | ICD-10-CM

## 2023-08-08 PROCEDURE — 99213 OFFICE O/P EST LOW 20 MIN: CPT | Performed by: PHYSICIAN ASSISTANT

## 2023-08-08 NOTE — PROGRESS NOTES
Yuliet Rodríguez is a pleasant 69-year-old  female that presents for 2-week follow-up after treatment to the right ear with Debrox due to residual cerumen plastered to the anterior portion of the TM. She reports that she had difficulty trying to get the Debrox in her ear due to a narrow canal.  She does feel like that the medicine had reached the eardrum. She continues with muffled hearing to the right ear. Physical examination with the microscope revealed a cerumen impaction to be present anteriorly that appeared to be liquefied. This was successfully removed with suction with no complications. After disimpaction the TM appeared to be normal with no evidence of infection. Impression: Successful cerumen disimpaction of the right ear after Debrox therapy    Plan: Patient is to follow-up in 3 months for cerumen check. She was reminded to utilize the Debrox 3 days prior to her office appointment to help with the right ear. She was reminded call if she has any questions or problems.       Electronically signed by Dat Cárdenas PA-C on 8/8/23 at 10:46 AM BIANCA

## 2023-10-11 DIAGNOSIS — Z00.00 ANNUAL PHYSICAL EXAM: ICD-10-CM

## 2023-10-11 DIAGNOSIS — D64.9 ANEMIA, UNSPECIFIED TYPE: ICD-10-CM

## 2023-10-11 DIAGNOSIS — C71.9 ANAPLASTIC GLIOMA OF BRAIN (HCC): ICD-10-CM

## 2023-10-11 LAB
ALBUMIN SERPL-MCNC: 4.4 G/DL (ref 3.5–5.2)
ALP SERPL-CCNC: 109 U/L (ref 35–104)
ALT SERPL-CCNC: 17 U/L (ref 5–33)
ANION GAP SERPL CALCULATED.3IONS-SCNC: 15 MMOL/L (ref 7–19)
AST SERPL-CCNC: 18 U/L (ref 5–32)
BILIRUB SERPL-MCNC: <0.2 MG/DL (ref 0.2–1.2)
BUN SERPL-MCNC: 7 MG/DL (ref 6–20)
CALCIUM SERPL-MCNC: 9.3 MG/DL (ref 8.6–10)
CHLORIDE SERPL-SCNC: 95 MMOL/L (ref 98–111)
CO2 SERPL-SCNC: 21 MMOL/L (ref 22–29)
CREAT SERPL-MCNC: 0.5 MG/DL (ref 0.5–0.9)
ERYTHROCYTE [DISTWIDTH] IN BLOOD BY AUTOMATED COUNT: 12.9 % (ref 11.5–14.5)
FERRITIN SERPL-MCNC: 41.7 NG/ML (ref 13–150)
FOLATE SERPL-MCNC: 5.9 NG/ML (ref 4.8–37.3)
GLUCOSE SERPL-MCNC: 96 MG/DL (ref 74–109)
HCT VFR BLD AUTO: 36.2 % (ref 37–47)
HGB BLD-MCNC: 11.8 G/DL (ref 12–16)
IRON SERPL-MCNC: 43 UG/DL (ref 37–145)
MCH RBC QN AUTO: 30.3 PG (ref 27–31)
MCHC RBC AUTO-ENTMCNC: 32.6 G/DL (ref 33–37)
MCV RBC AUTO: 92.8 FL (ref 81–99)
PLATELET # BLD AUTO: 355 K/UL (ref 130–400)
PMV BLD AUTO: 10.7 FL (ref 9.4–12.3)
POTASSIUM SERPL-SCNC: 4.9 MMOL/L (ref 3.5–5)
PROT SERPL-MCNC: 7.5 G/DL (ref 6.6–8.7)
RBC # BLD AUTO: 3.9 M/UL (ref 4.2–5.4)
SODIUM SERPL-SCNC: 131 MMOL/L (ref 136–145)
TSH SERPL DL<=0.005 MIU/L-ACNC: 3.34 UIU/ML (ref 0.27–4.2)
VIT B12 SERPL-MCNC: 370 PG/ML (ref 211–946)
WBC # BLD AUTO: 7.7 K/UL (ref 4.8–10.8)

## 2023-10-20 ENCOUNTER — TELEPHONE (OUTPATIENT)
Dept: INTERNAL MEDICINE | Age: 43
End: 2023-10-20

## 2023-10-20 NOTE — TELEPHONE ENCOUNTER
I called her and she is feeling ok. Based on her age and medical issues she does not qualify for paxlovid but let us know if not better.

## 2023-10-20 NOTE — TELEPHONE ENCOUNTER
Pt called tested positive yesterday for covid symptoms started Wednesday night stuffy/runny nose and headache she is asking for the paxlovid

## 2023-10-23 ASSESSMENT — PATIENT HEALTH QUESTIONNAIRE - PHQ9
SUM OF ALL RESPONSES TO PHQ QUESTIONS 1-9: 0
8. MOVING OR SPEAKING SO SLOWLY THAT OTHER PEOPLE COULD HAVE NOTICED. OR THE OPPOSITE, BEING SO FIGETY OR RESTLESS THAT YOU HAVE BEEN MOVING AROUND A LOT MORE THAN USUAL: 0
3. TROUBLE FALLING OR STAYING ASLEEP: 0
2. FEELING DOWN, DEPRESSED OR HOPELESS: 0
3. TROUBLE FALLING OR STAYING ASLEEP: NOT AT ALL
6. FEELING BAD ABOUT YOURSELF - OR THAT YOU ARE A FAILURE OR HAVE LET YOURSELF OR YOUR FAMILY DOWN: NOT AT ALL
4. FEELING TIRED OR HAVING LITTLE ENERGY: 0
5. POOR APPETITE OR OVEREATING: 0
SUM OF ALL RESPONSES TO PHQ QUESTIONS 1-9: 0
10. IF YOU CHECKED OFF ANY PROBLEMS, HOW DIFFICULT HAVE THESE PROBLEMS MADE IT FOR YOU TO DO YOUR WORK, TAKE CARE OF THINGS AT HOME, OR GET ALONG WITH OTHER PEOPLE: 0
1. LITTLE INTEREST OR PLEASURE IN DOING THINGS: 0
10. IF YOU CHECKED OFF ANY PROBLEMS, HOW DIFFICULT HAVE THESE PROBLEMS MADE IT FOR YOU TO DO YOUR WORK, TAKE CARE OF THINGS AT HOME, OR GET ALONG WITH OTHER PEOPLE: NOT DIFFICULT AT ALL
9. THOUGHTS THAT YOU WOULD BE BETTER OFF DEAD, OR OF HURTING YOURSELF: NOT AT ALL
7. TROUBLE CONCENTRATING ON THINGS, SUCH AS READING THE NEWSPAPER OR WATCHING TELEVISION: NOT AT ALL
6. FEELING BAD ABOUT YOURSELF - OR THAT YOU ARE A FAILURE OR HAVE LET YOURSELF OR YOUR FAMILY DOWN: 0
2. FEELING DOWN, DEPRESSED OR HOPELESS: NOT AT ALL
8. MOVING OR SPEAKING SO SLOWLY THAT OTHER PEOPLE COULD HAVE NOTICED. OR THE OPPOSITE - BEING SO FIDGETY OR RESTLESS THAT YOU HAVE BEEN MOVING AROUND A LOT MORE THAN USUAL: NOT AT ALL
5. POOR APPETITE OR OVEREATING: NOT AT ALL
4. FEELING TIRED OR HAVING LITTLE ENERGY: NOT AT ALL
9. THOUGHTS THAT YOU WOULD BE BETTER OFF DEAD, OR OF HURTING YOURSELF: 0
SUM OF ALL RESPONSES TO PHQ QUESTIONS 1-9: 0
SUM OF ALL RESPONSES TO PHQ9 QUESTIONS 1 & 2: 0
1. LITTLE INTEREST OR PLEASURE IN DOING THINGS: NOT AT ALL
7. TROUBLE CONCENTRATING ON THINGS, SUCH AS READING THE NEWSPAPER OR WATCHING TELEVISION: 0

## 2023-10-24 ENCOUNTER — TELEPHONE (OUTPATIENT)
Dept: INTERNAL MEDICINE | Age: 43
End: 2023-10-24

## 2023-10-24 NOTE — TELEPHONE ENCOUNTER
I tried to call her I left a message I can do a virtual visit today or I could just talk to her on the phone and keep the office visit Thursday. She has COVID right now.   Also she could come in wearing a mask today as pretty far along in course so that would be an option also -- I am glad to see her today

## 2023-10-26 ENCOUNTER — OFFICE VISIT (OUTPATIENT)
Dept: INTERNAL MEDICINE | Age: 43
End: 2023-10-26
Payer: COMMERCIAL

## 2023-10-26 VITALS
BODY MASS INDEX: 33.61 KG/M2 | DIASTOLIC BLOOD PRESSURE: 80 MMHG | SYSTOLIC BLOOD PRESSURE: 124 MMHG | HEIGHT: 61 IN | OXYGEN SATURATION: 97 % | HEART RATE: 74 BPM | WEIGHT: 178 LBS

## 2023-10-26 DIAGNOSIS — C71.9 ANAPLASTIC GLIOMA OF BRAIN (HCC): Primary | ICD-10-CM

## 2023-10-26 DIAGNOSIS — Z13.220 SCREENING, LIPID: ICD-10-CM

## 2023-10-26 DIAGNOSIS — G40.909 SEIZURE DISORDER (HCC): ICD-10-CM

## 2023-10-26 DIAGNOSIS — E55.9 VITAMIN D DEFICIENCY: ICD-10-CM

## 2023-10-26 DIAGNOSIS — U07.1 COVID-19: ICD-10-CM

## 2023-10-26 PROCEDURE — 99214 OFFICE O/P EST MOD 30 MIN: CPT | Performed by: INTERNAL MEDICINE

## 2023-10-26 NOTE — PROGRESS NOTES
Anaplastic glioma of brain [facility])  Patient with history of anaplastic glioma of the brain. Status post treatment been ongoing followed closely at [facility]. Recent MRI of the brain she has recently had COVID in fact she still within the 10-day period that a mass needs we feel like the seizure she had during COVID were related. If recurrent seizure we need to know and we will have her neurologist otherwise she is seeing him as scheduled. Work closely. Continue current meds. Had message with Dr. 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  She is on maximal Trileptal he said he felt they were provoked but if recurred he would add zonisamide. Patient did have significant exposures as a child living near [facility] and participating in activities at [facility]. - CBC; Future  - Comprehensive Metabolic Panel; Future    2. Seizure disorder [facility])  See plan above watch closely    3. Screening, lipid    - TSH; Future  - Lipid Panel; Future    4. Vitamin D deficiency    - Vitamin D 25 Hydroxy; Future    5. COVID-19  Patient slated 5 days now needs to wear mask in public 5 days her symptoms are improving.

## 2023-11-08 ENCOUNTER — OFFICE VISIT (OUTPATIENT)
Dept: ENT CLINIC | Age: 43
End: 2023-11-08
Payer: COMMERCIAL

## 2023-11-08 VITALS
WEIGHT: 179 LBS | DIASTOLIC BLOOD PRESSURE: 74 MMHG | SYSTOLIC BLOOD PRESSURE: 120 MMHG | BODY MASS INDEX: 33.79 KG/M2 | HEIGHT: 61 IN

## 2023-11-08 DIAGNOSIS — H61.21 IMPACTED CERUMEN OF RIGHT EAR: Primary | ICD-10-CM

## 2023-11-08 PROCEDURE — 69210 REMOVE IMPACTED EAR WAX UNI: CPT | Performed by: PHYSICIAN ASSISTANT

## 2023-11-08 NOTE — PROGRESS NOTES
Lashaun Mckenzie is a pleasant 27-year-old  female that presents for a 3-month cerumen check. Patient reports that she has noticed gradual diminished hearing and feels like she has recurrent cerumen impaction. Of note, patient recently had recurrence of her seizures after having COVID. She is now on a 3-month driving restriction because of recurrent seizure activity. She denies any head trauma related to the seizures. Physical examination with the microscope revealed a serum impaction to the right ear that was successfully removed. -Please refer to separate dictated procedure note  Left ear demonstrated normal TM canal.  Neck exam demonstrated no lymphadenopathy or thyromegaly. Impression: Successful cerumen disimpaction with microscopy and instrumentation-right side    Plan: Patient is to follow-up in 3 months for cerumen check. She was reminded to call if she has any questions or problems.       Electronically signed by Carito De La Torre PA-C on 11/8/23 at 10:44 AM CST

## 2023-12-20 ENCOUNTER — TELEPHONE (OUTPATIENT)
Dept: NEUROLOGY | Age: 43
End: 2023-12-20

## 2023-12-21 ENCOUNTER — TELEPHONE (OUTPATIENT)
Dept: NEUROLOGY | Age: 43
End: 2023-12-21

## 2023-12-21 ENCOUNTER — OFFICE VISIT (OUTPATIENT)
Dept: NEUROLOGY | Age: 43
End: 2023-12-21
Payer: COMMERCIAL

## 2023-12-21 VITALS
SYSTOLIC BLOOD PRESSURE: 132 MMHG | WEIGHT: 177 LBS | HEART RATE: 84 BPM | RESPIRATION RATE: 18 BRPM | BODY MASS INDEX: 34.75 KG/M2 | DIASTOLIC BLOOD PRESSURE: 89 MMHG | HEIGHT: 60 IN

## 2023-12-21 DIAGNOSIS — G40.919 INTRACTABLE SEIZURES (HCC): Primary | ICD-10-CM

## 2023-12-21 DIAGNOSIS — C71.0: ICD-10-CM

## 2023-12-21 PROCEDURE — 99214 OFFICE O/P EST MOD 30 MIN: CPT | Performed by: PSYCHIATRY & NEUROLOGY

## 2023-12-21 RX ORDER — TOPIRAMATE 25 MG/1
TABLET ORAL
Qty: 120 TABLET | Refills: 5 | Status: SHIPPED | OUTPATIENT
Start: 2023-12-21

## 2023-12-21 NOTE — PATIENT INSTRUCTIONS
INSTRUCTIONS:  1. Will have you get an MRI brain  2. Will have you go back to Trileptal 600/900  3. Start Topamax 25 mg twice a day for 2 weeks, then 50 mg twice a day.

## 2023-12-21 NOTE — TELEPHONE ENCOUNTER
Jess Zambrano called to cancel her MRI with [facility] ([facility]). She is requesting the order for the MRI of Brain to be sent and authorized to [facility]. Jess Zambrano stated that [facility] can see her much sooner for the MRI. Thank you.

## 2023-12-21 NOTE — PROGRESS NOTES
[facility]  [address] [facility], [place], [address] [place] [address]  Phone [phone]  Fax [phone]     [facility] Follow Up Encounter  23 1:45 PM CST    Information:   Patient Name: Chris Riggs  :   1980  Age:   39 y.o. MRN:   [id number]  Account #:  [de-identified]  Today:  23    Provider: Brianne Cordero M.D. Chief Complaint:   Chief Complaint   Patient presents with    Follow-up     ED for seizure    Seizures       Subjective:   Chris Riggs is a 39 y. o. 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 Her right craniotomy, chemotherapy, and radiation was 12 years ago. She takes Trileptal 600/900 and previously had difficulty with 900 mg BID and did not tolerate Keppra. She had been doing well until she had another apparent seizure two days ago. She was shopping with her mother at [facility] when she developed speech arrest and left lower facial weakness. She was seen in the ER and the Trileptal was increased to 900 mg BID for now. She gradually improved but still has some difficulty with her speech and the left face is still a little weak. She denies headaches. She says that she had a televisit with a provider at [facility] Neurology Department. She says he advised her to start Topamax. Objective:     Past Medical History:  Past Medical History:   Diagnosis Date    Anaplastic glioma of brain ([address] )     Anxiety 2017    Basal cell carcinoma     Dysthymia 2017    Seizure disorder ([address]) 2017       Past Surgical History:   Procedure Laterality Date    BREAST BIOPSY Left 2006    benign    BREAST BIOPSY Left 2010    benign    CRANIOTOMY      SKIN CANCER EXCISION         Recent Hospitalizations  None    Significant Injuries  None    Habits  Tao Skinner reports that she has never smoked. She has never used smokeless tobacco. She reports that she does not drink alcohol and does not use drugs.     Family

## 2023-12-27 NOTE — TELEPHONE ENCOUNTER
Spoke with Kendra Sahu to let her know that her MRI was approved and sent to [facility]. Told Sue to give them at least an hour to receive that fax and try to call and schedule. She voiced understanding.

## 2023-12-29 ENCOUNTER — OFFICE VISIT (OUTPATIENT)
Dept: INTERNAL MEDICINE | Age: 43
End: 2023-12-29
Payer: COMMERCIAL

## 2023-12-29 VITALS
DIASTOLIC BLOOD PRESSURE: 78 MMHG | HEIGHT: 60 IN | SYSTOLIC BLOOD PRESSURE: 130 MMHG | OXYGEN SATURATION: 99 % | WEIGHT: 179.2 LBS | BODY MASS INDEX: 35.18 KG/M2 | HEART RATE: 73 BPM

## 2023-12-29 DIAGNOSIS — F34.1 DYSTHYMIA: ICD-10-CM

## 2023-12-29 DIAGNOSIS — C71.9 ANAPLASTIC ASTROCYTOMA (HCC): ICD-10-CM

## 2023-12-29 DIAGNOSIS — G40.909 SEIZURE DISORDER (HCC): Primary | ICD-10-CM

## 2023-12-29 PROCEDURE — 99214 OFFICE O/P EST MOD 30 MIN: CPT | Performed by: INTERNAL MEDICINE

## 2024-01-09 NOTE — PROGRESS NOTES
Urine 0.2 <2.0 E.U./dL    Nitrite, Urine Negative Negative    Leukocyte Esterase, Urine Negative Negative   HCG Qualitative, Serum   Result Value Ref Range    hCG Qual Negative    EKG 12 Lead   Result Value Ref Range    P-R Interval 124 ms    QRS Duration 92 ms    Q-T Interval 360 ms    QTc Calculation (Bazett) 398 ms    P Axis 66 degrees    T Axis 18 degrees       ASSESSMENT/ PLAN:  1. Seizure disorder (HCC)  Reviewed record and increased meds/ added meds- read [facility] note - and reviewed other records  Geoff[facility] record-- 12/21--PLAN  1. Resume Trileptal 600 mg AM and 900 mg PM.   2. Add Topamax 25 mg PM for 7 days. Add AM dose of 25 mg, based on efficacy and tolerability  3. RTC for follow up 3 weeks, Telemed  4. She plans to meet with her local neurologist today. I am open to alternative medication recommendations which he may give.  5. Next MRI Brain scheduled 3/19/24. RTC thereafter.     2. Anaplastic astrocytoma (HCC)  Repeat MRI and f/u at [place]    3. Anxiety- Discussed mood and issues - follow closely - d/w concerns re: stressors - avoidance of stress    4. Dysthymia  Discussed mood and issues - follow closely - d/w concerns re: stressors - avoidance of stress

## 2024-01-11 ENCOUNTER — TELEPHONE (OUTPATIENT)
Dept: NEUROLOGY | Age: 44
End: 2024-01-11

## 2024-01-11 NOTE — TELEPHONE ENCOUNTER
MRI head was stable.  No tumor recurrence.  No stroke.    Spoke to patient and gave MRI head results. Answered questions and she voiced understanding.

## 2024-02-08 ENCOUNTER — OFFICE VISIT (OUTPATIENT)
Dept: ENT CLINIC | Age: 44
End: 2024-02-08
Payer: COMMERCIAL

## 2024-02-08 VITALS
WEIGHT: 177 LBS | DIASTOLIC BLOOD PRESSURE: 80 MMHG | HEIGHT: 60 IN | BODY MASS INDEX: 34.75 KG/M2 | SYSTOLIC BLOOD PRESSURE: 124 MMHG

## 2024-02-08 DIAGNOSIS — H61.21 IMPACTED CERUMEN OF RIGHT EAR: Primary | ICD-10-CM

## 2024-02-08 PROCEDURE — 69210 REMOVE IMPACTED EAR WAX UNI: CPT | Performed by: PHYSICIAN ASSISTANT

## 2024-02-08 NOTE — PROGRESS NOTES
Sue is a pleasant 43-year-old  female that presents for a 3-month cerumen check.  Patient has a history of radiation therapy to the right side of the head that has resulted in scar tissue to the anterior portion of the TM causing the recurrent cerumen.  Patient reports that she has still been having sporadic seizures with the last one being [place].  She is currently on driving restrictions and is currently on seizure medications.      Physical examination with microscope confirmed a cerumen faction to the right ear.  This was successfully removed down to the scar tissue noted on the right anterior region.  A small amount of cerumen was left in place due to the discomfort.  The TM appeared to be intact with no infection.  Examination of the left ear demonstrated normal TM and canal.  Neck exam demonstrated no lymphadenopathy or thyromegaly.      Impression: Successful cerumen disimpaction of the right ear with microscopy and instrumentation    Plan: Patient is to follow-up in 3 months for cerumen check.  She was reminded to call if she has any questions or concerns.      Electronically signed by GASPER CORLEY PA-C on 2/8/24 at 9:02 AM CST

## 2024-02-14 ENCOUNTER — OFFICE VISIT (OUTPATIENT)
Dept: ENT CLINIC | Age: 44
End: 2024-02-14
Payer: COMMERCIAL

## 2024-02-14 VITALS
BODY MASS INDEX: 34.75 KG/M2 | SYSTOLIC BLOOD PRESSURE: 128 MMHG | WEIGHT: 177 LBS | DIASTOLIC BLOOD PRESSURE: 74 MMHG | HEIGHT: 60 IN

## 2024-02-14 DIAGNOSIS — H61.21 IMPACTED CERUMEN OF RIGHT EAR: Primary | ICD-10-CM

## 2024-02-14 PROCEDURE — 99213 OFFICE O/P EST LOW 20 MIN: CPT | Performed by: PHYSICIAN ASSISTANT

## 2024-02-14 RX ORDER — CIPROFLOXACIN AND DEXAMETHASONE 3; 1 MG/ML; MG/ML
4 SUSPENSION/ DROPS AURICULAR (OTIC) 2 TIMES DAILY
Qty: 7 ML | Refills: 0 | Status: SHIPPED | OUTPATIENT
Start: 2024-02-14 | End: 2024-02-24

## 2024-02-14 NOTE — PROGRESS NOTES
Sue is a pleasant 43-year-old  female that presents for reevaluation of the right ear.  Patient was noted with residual cerumen anteriorly from her previous scar tissue that has been present on her TM.  She was treating this with Debrox and reports that now she is experiencing some changes in hearing.  Overall she denies any dizziness or any additional problems.      Physical examination with microscope revealed the patient have a normal TM and canal to the left side.  Right side demonstrated a cerumen impaction present anteriorly.  I initially attempted to remove this with suction and was unsuccessful due to the still being plastered to the TM.  I meticulously peeled the cerumen off of the anterior portion of the TM and scar tissue with no complications.  Overall this appeared to be somewhat irritated from the Debrox.  No TM perforation was noted or any evidence of erythema to the TM.      Impression: Successful cerumen disimpaction of the right ear    Plan: Patient is to follow-up in 3 months for cerumen check.  I will place her on Ciprodex for the right ear due to the irritation that was noted today.  She was advised to call if this worsens or she has any questions.      Electronically signed by GASPER CORLEY PA-C on 2/14/24 at 4:31 PM CST

## 2024-02-22 ENCOUNTER — OFFICE VISIT (OUTPATIENT)
Dept: INTERNAL MEDICINE | Age: 44
End: 2024-02-22
Payer: COMMERCIAL

## 2024-02-22 ENCOUNTER — OFFICE VISIT (OUTPATIENT)
Dept: NEUROLOGY | Age: 44
End: 2024-02-22
Payer: COMMERCIAL

## 2024-02-22 VITALS
HEIGHT: 61 IN | OXYGEN SATURATION: 100 % | WEIGHT: 180 LBS | SYSTOLIC BLOOD PRESSURE: 120 MMHG | HEART RATE: 78 BPM | DIASTOLIC BLOOD PRESSURE: 78 MMHG | BODY MASS INDEX: 33.99 KG/M2

## 2024-02-22 VITALS
BODY MASS INDEX: 35.14 KG/M2 | DIASTOLIC BLOOD PRESSURE: 83 MMHG | WEIGHT: 179 LBS | HEART RATE: 75 BPM | HEIGHT: 60 IN | SYSTOLIC BLOOD PRESSURE: 119 MMHG | RESPIRATION RATE: 18 BRPM

## 2024-02-22 DIAGNOSIS — F34.1 DYSTHYMIA: ICD-10-CM

## 2024-02-22 DIAGNOSIS — Z12.11 SCREEN FOR COLON CANCER: ICD-10-CM

## 2024-02-22 DIAGNOSIS — C71.9 ANAPLASTIC ASTROCYTOMA (HCC): ICD-10-CM

## 2024-02-22 DIAGNOSIS — C71.0: ICD-10-CM

## 2024-02-22 DIAGNOSIS — E87.1 HYPONATREMIA: ICD-10-CM

## 2024-02-22 DIAGNOSIS — G40.919 INTRACTABLE SEIZURES (HCC): Primary | ICD-10-CM

## 2024-02-22 DIAGNOSIS — G40.909 SEIZURE DISORDER (HCC): Primary | ICD-10-CM

## 2024-02-22 PROCEDURE — 99213 OFFICE O/P EST LOW 20 MIN: CPT | Performed by: INTERNAL MEDICINE

## 2024-02-22 PROCEDURE — 99214 OFFICE O/P EST MOD 30 MIN: CPT | Performed by: PSYCHIATRY & NEUROLOGY

## 2024-02-22 RX ORDER — TOPIRAMATE 50 MG/1
TABLET, FILM COATED ORAL
Qty: 60 TABLET | Refills: 5
Start: 2024-02-22 | End: 2024-02-22 | Stop reason: SDUPTHER

## 2024-02-22 RX ORDER — TOPIRAMATE 50 MG/1
TABLET, FILM COATED ORAL
Qty: 60 TABLET | Refills: 5 | Status: SHIPPED | OUTPATIENT
Start: 2024-02-22 | End: 2024-02-28 | Stop reason: SDUPTHER

## 2024-02-22 ASSESSMENT — PATIENT HEALTH QUESTIONNAIRE - PHQ9
6. FEELING BAD ABOUT YOURSELF - OR THAT YOU ARE A FAILURE OR HAVE LET YOURSELF OR YOUR FAMILY DOWN: 0
SUM OF ALL RESPONSES TO PHQ QUESTIONS 1-9: 4
4. FEELING TIRED OR HAVING LITTLE ENERGY: 1
8. MOVING OR SPEAKING SO SLOWLY THAT OTHER PEOPLE COULD HAVE NOTICED. OR THE OPPOSITE, BEING SO FIGETY OR RESTLESS THAT YOU HAVE BEEN MOVING AROUND A LOT MORE THAN USUAL: 0
5. POOR APPETITE OR OVEREATING: 1
SUM OF ALL RESPONSES TO PHQ QUESTIONS 1-9: 4
1. LITTLE INTEREST OR PLEASURE IN DOING THINGS: 1
3. TROUBLE FALLING OR STAYING ASLEEP: 1
2. FEELING DOWN, DEPRESSED OR HOPELESS: 0
SUM OF ALL RESPONSES TO PHQ QUESTIONS 1-9: 4
9. THOUGHTS THAT YOU WOULD BE BETTER OFF DEAD, OR OF HURTING YOURSELF: 0
SUM OF ALL RESPONSES TO PHQ9 QUESTIONS 1 & 2: 1
SUM OF ALL RESPONSES TO PHQ QUESTIONS 1-9: 4
7. TROUBLE CONCENTRATING ON THINGS, SUCH AS READING THE NEWSPAPER OR WATCHING TELEVISION: 0
10. IF YOU CHECKED OFF ANY PROBLEMS, HOW DIFFICULT HAVE THESE PROBLEMS MADE IT FOR YOU TO DO YOUR WORK, TAKE CARE OF THINGS AT HOME, OR GET ALONG WITH OTHER PEOPLE: 0

## 2024-02-22 NOTE — PROGRESS NOTES
Chief Complaint   Patient presents with    Follow-up     2 months - HX of seizure disorder       HPI: Patient is here today to follow-up seizure disorder also some fatigue and depression.  Very worrisome and bothersome to have been having the seizures recently.  We reviewed her meds and wanted to change her Topamax to be a simpler regimen of 50 twice a day instead of 25 to twice a day.  She has follow-up with [place] and follows with Dr. Villasenor here.  History of brain tumor.    Past Medical History:   Diagnosis Date    Anaplastic glioma of brain (HCC)     Anxiety 6/28/2017    Basal cell carcinoma     Dysthymia 6/28/2017    Seizure disorder (HCC) 6/28/2017       Past Surgical History:   Procedure Laterality Date    BREAST BIOPSY Left 2006    benign    BREAST BIOPSY Left 2010    benign    CRANIOTOMY      SKIN CANCER EXCISION         Family History   Problem Relation Age of Onset    Hypertension Mother     Esophageal Cancer Father     Breast Cancer Maternal Aunt 27    Brain Cancer Paternal Aunt     Ovarian Cancer Other        Social History     Socioeconomic History    Marital status:      Spouse name: Not on file    Number of children: Not on file    Years of education: Not on file    Highest education level: Not on file   Occupational History    Not on file   Tobacco Use    Smoking status: Never    Smokeless tobacco: Never   Vaping Use    Vaping Use: Never used   Substance and Sexual Activity    Alcohol use: Never    Drug use: No    Sexual activity: Yes     Partners: Male   Other Topics Concern    Not on file   Social History Narrative    Not on file     Social Determinants of Health     Financial Resource Strain: Low Risk  (10/18/2022)    Overall Financial Resource Strain (CARDIA)     Difficulty of Paying Living Expenses: Not very hard   Food Insecurity: Not on file (3/23/2023)   Transportation Needs: Not on file   Physical Activity: Not on file   Stress: Not on file   Social Connections: Not on file

## 2024-02-22 NOTE — PROGRESS NOTES
[facility]  [address], [address]  [place] [address]  Phone [phone]  Fax [phone]     [facility] Follow Up Encounter  24 9:28 AM CST    Information:   Patient Name: Sue Faria  :   1980  Age:   43 y.o.  MRN:   [id number]  Account #:  [id number]  Today:  24    Provider: Caleb Villasenor M.D.     Chief Complaint:   Chief Complaint   Patient presents with    Follow-up    Seizures     Had 2 seizures on 24.       Subjective:   Sue Faria is a 43 y.o. 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  Her right craniotomy, chemotherapy, and radiation was 12 years ago.  She had an episode in December.  She did not tolerate higher Trileptal dose and Topamax was added.  She did have two minor seizure shortly after starting it.  She has done well since.  The left hand and face weakness did resolve.  The MRI head was negative.  He mother says that she has been a little irritable.  The spells consisted of slurred speech, left hand dyscoordination, problems thinking properly that last minutes to hours.        Objective:     Past Medical History:  Past Medical History:   Diagnosis Date    Anaplastic glioma of brain (HCC)     Anxiety 2017    Basal cell carcinoma     Dysthymia 2017    Seizure disorder (HCC) 2017       Past Surgical History:   Procedure Laterality Date    BREAST BIOPSY Left 2006    benign    BREAST BIOPSY Left 2010    benign    CRANIOTOMY      SKIN CANCER EXCISION         Recent Hospitalizations  None    Significant Injuries  None    Habits  Sue Faria reports that she has never smoked. She has never used smokeless tobacco. She reports that she does not drink alcohol and does not use drugs.    Family History   Problem Relation Age of Onset    Hypertension Mother     Esophageal Cancer Father     Breast Cancer Maternal Aunt 27    Brain Cancer Paternal Aunt     Ovarian Cancer Other

## 2024-02-28 DIAGNOSIS — G40.909 SEIZURE DISORDER (HCC): ICD-10-CM

## 2024-02-28 RX ORDER — TOPIRAMATE 50 MG/1
50 TABLET, FILM COATED ORAL 2 TIMES DAILY
Qty: 180 TABLET | Refills: 1 | Status: SHIPPED | OUTPATIENT
Start: 2024-02-28

## 2024-03-02 DIAGNOSIS — E87.1 HYPONATREMIA: ICD-10-CM

## 2024-03-02 DIAGNOSIS — G40.919 INTRACTABLE SEIZURES (HCC): Primary | ICD-10-CM

## 2024-03-07 DIAGNOSIS — E87.1 HYPONATREMIA: ICD-10-CM

## 2024-03-07 DIAGNOSIS — G40.919 INTRACTABLE SEIZURES (HCC): ICD-10-CM

## 2024-03-07 LAB
ANION GAP SERPL CALCULATED.3IONS-SCNC: 9 MMOL/L (ref 7–19)
BUN SERPL-MCNC: 8 MG/DL (ref 6–20)
CALCIUM SERPL-MCNC: 9.5 MG/DL (ref 8.6–10)
CHLORIDE SERPL-SCNC: 104 MMOL/L (ref 98–111)
CO2 SERPL-SCNC: 23 MMOL/L (ref 22–29)
CREAT SERPL-MCNC: 0.7 MG/DL (ref 0.5–0.9)
GLUCOSE SERPL-MCNC: 95 MG/DL (ref 74–109)
POTASSIUM SERPL-SCNC: 4.5 MMOL/L (ref 3.5–5)
SODIUM SERPL-SCNC: 136 MMOL/L (ref 136–145)

## 2024-03-09 LAB
10OH-CARBAZEPINE SERPL-MCNC: 18 UG/ML (ref 3–35)
TOPIRAMATE SERPL-MCNC: 3.4 UG/ML (ref 5–20)

## 2024-03-11 ENCOUNTER — HOSPITAL ENCOUNTER (OUTPATIENT)
Dept: NEUROLOGY | Age: 44
Discharge: HOME | End: 2024-03-11
Attending: PSYCHIATRY & NEUROLOGY
Payer: COMMERCIAL

## 2024-03-11 PROCEDURE — 95812 EEG 41-60 MINUTES: CPT | Performed by: PSYCHIATRY & NEUROLOGY

## 2024-03-11 PROCEDURE — 95812 EEG 41-60 MINUTES: CPT

## 2024-03-15 NOTE — PROCEDURES
[facility]  [facility], [address]  [place] [address]  Phone [phone]  Fax [phone]       Electroencephalography    Information:   Patient Name: Sue Faria  :   1980  Age:   43 y.o.  MRN:   [id number]  Account #:  [id number]  Today:  3/11/24    Referring Provider:  Caleb Villasenor M.D.  Interpreting Provider: Caleb Villasenor M.D.    HISTORY:   Sue Faria is a 43 y.o. woman with a history of right temporal anaplastic glioma resection and intractable seizures who has had recurring peculiar seizures.          SUMMARY OF THE RECORDING:   Forty-three minutes were recorded.  A symmetrical background rhythm of 9Hertz was present in the posterior regions.  This attenuates with eye opening.  Occasional beta activity is noted.  Higher amplitude, irregular theta activity is present at the T4 electrode.  During the recording, the patient becomes drowsy and the background slows and flattens.  Photic stimulation does not elicit a driving response.  No epileptiform abnormalities were identified.      IMPRESSION:   Delta grade 2;  right temporal irregular slowing and breach rhythm.  The findings are consistent with a skull defect and right temporal lesion or injury.          Electronically signed by Caleb Villasenor MD

## 2024-03-18 ENCOUNTER — OFFICE VISIT (OUTPATIENT)
Dept: NEUROLOGY | Age: 44
End: 2024-03-18
Payer: COMMERCIAL

## 2024-03-18 VITALS
BODY MASS INDEX: 34.55 KG/M2 | SYSTOLIC BLOOD PRESSURE: 131 MMHG | HEIGHT: 60 IN | RESPIRATION RATE: 18 BRPM | WEIGHT: 176 LBS | DIASTOLIC BLOOD PRESSURE: 88 MMHG | HEART RATE: 74 BPM

## 2024-03-18 DIAGNOSIS — C71.0: ICD-10-CM

## 2024-03-18 DIAGNOSIS — G40.919 INTRACTABLE SEIZURES (HCC): Primary | ICD-10-CM

## 2024-03-18 PROCEDURE — 99214 OFFICE O/P EST MOD 30 MIN: CPT | Performed by: PSYCHIATRY & NEUROLOGY

## 2024-03-18 RX ORDER — TOPIRAMATE 100 MG/1
100 TABLET, FILM COATED ORAL 2 TIMES DAILY
Qty: 180 TABLET | Refills: 3 | Status: SHIPPED | OUTPATIENT
Start: 2024-03-18

## 2024-03-18 NOTE — PROGRESS NOTES
[facility]  [address], [address]  [place]  [address]  Phone [phone]  Fax [phone]     [facility] Neurology Follow Up Encounter  3/18/24 9:21 AM CDT    Information:   Patient Name: Sue Faria  :   1980  Age:   43 y.o.  MRN:   [id number]  Account #:  [id number]  Today:  3/18/24    Provider: Caleb Villasenor M.D.     Chief Complaint:   Chief Complaint   Patient presents with    Follow-up    Seizures       Subjective:   Sue Faria is a 43 y.o. woman with a history of right temporal anaplastic astrocytoma resection 14 years ago and seizuress who is following up.  She had a seizure in December.  She is maxed out on oxcarbazepine (600 mg BID) and Topamax was added.  She is taking 50 mg BID.  She tolerates it.  She had another seizure last week.  She had speech arrest but no shaking, weakness, numbness.  It lasted a few minutes.  She had no impairment of awareness or responsiveness.        Objective:     Past Medical History:  Past Medical History:   Diagnosis Date    Anaplastic glioma of brain (HCC)     Anxiety 2017    Basal cell carcinoma     Dysthymia 2017    Seizure disorder (HCC) 2017       Past Surgical History:   Procedure Laterality Date    BREAST BIOPSY Left 2006    benign    BREAST BIOPSY Left 2010    benign    CRANIOTOMY      SKIN CANCER EXCISION         Recent Hospitalizations  None    Significant Injuries  None    Habits  Sue Faria reports that she has never smoked. She has never used smokeless tobacco. She reports that she does not drink alcohol and does not use drugs.    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      Medications:  Current Outpatient Medications   Medication Sig Dispense Refill    topiramate

## 2024-03-18 NOTE — PATIENT INSTRUCTIONS
INSTRUCTIONS:  Continue taking Trileptal 600 mg twice a day  Increase the Topamax to 100 mg twice a day

## 2024-03-25 ENCOUNTER — TELEPHONE (OUTPATIENT)
Dept: GASTROENTEROLOGY | Age: 44
End: 2024-03-25

## 2024-04-19 DIAGNOSIS — Z13.220 SCREENING, LIPID: ICD-10-CM

## 2024-04-19 DIAGNOSIS — C71.9 ANAPLASTIC GLIOMA OF BRAIN (HCC): ICD-10-CM

## 2024-04-19 DIAGNOSIS — D64.9 ANEMIA, UNSPECIFIED TYPE: ICD-10-CM

## 2024-04-19 DIAGNOSIS — E55.9 VITAMIN D DEFICIENCY: ICD-10-CM

## 2024-04-19 DIAGNOSIS — D64.9 ANEMIA, UNSPECIFIED TYPE: Primary | ICD-10-CM

## 2024-04-19 LAB
25(OH)D3 SERPL-MCNC: 35.9 NG/ML
ALBUMIN SERPL-MCNC: 4.1 G/DL (ref 3.5–5.2)
ALP SERPL-CCNC: 113 U/L (ref 35–104)
ALT SERPL-CCNC: 15 U/L (ref 5–33)
ANION GAP SERPL CALCULATED.3IONS-SCNC: 12 MMOL/L (ref 7–19)
AST SERPL-CCNC: 15 U/L (ref 5–32)
BILIRUB SERPL-MCNC: <0.2 MG/DL (ref 0.2–1.2)
BUN SERPL-MCNC: 8 MG/DL (ref 6–20)
CALCIUM SERPL-MCNC: 9.4 MG/DL (ref 8.6–10)
CHLORIDE SERPL-SCNC: 103 MMOL/L (ref 98–111)
CHOLEST SERPL-MCNC: 156 MG/DL (ref 160–199)
CO2 SERPL-SCNC: 20 MMOL/L (ref 22–29)
CREAT SERPL-MCNC: 0.7 MG/DL (ref 0.5–0.9)
ERYTHROCYTE [DISTWIDTH] IN BLOOD BY AUTOMATED COUNT: 12.9 % (ref 11.5–14.5)
FERRITIN SERPL-MCNC: 68.9 NG/ML (ref 13–150)
FOLATE SERPL-MCNC: 6.2 NG/ML (ref 4.8–37.3)
GLUCOSE SERPL-MCNC: 92 MG/DL (ref 74–109)
HCT VFR BLD AUTO: 36.5 % (ref 37–47)
HDLC SERPL-MCNC: 42 MG/DL (ref 65–121)
HGB BLD-MCNC: 11.6 G/DL (ref 12–16)
IRON SATN MFR SERPL: 58 % (ref 14–50)
IRON SERPL-MCNC: 115 UG/DL (ref 37–145)
LDLC SERPL CALC-MCNC: 89 MG/DL
MCH RBC QN AUTO: 29.3 PG (ref 27–31)
MCHC RBC AUTO-ENTMCNC: 31.8 G/DL (ref 33–37)
MCV RBC AUTO: 92.2 FL (ref 81–99)
PLATELET # BLD AUTO: 351 K/UL (ref 130–400)
PMV BLD AUTO: 9.9 FL (ref 9.4–12.3)
POTASSIUM SERPL-SCNC: 4.4 MMOL/L (ref 3.5–5)
PROT SERPL-MCNC: 7.4 G/DL (ref 6.6–8.7)
RBC # BLD AUTO: 3.96 M/UL (ref 4.2–5.4)
SODIUM SERPL-SCNC: 135 MMOL/L (ref 136–145)
TIBC SERPL-MCNC: 198 UG/DL (ref 250–400)
TRIGL SERPL-MCNC: 123 MG/DL (ref 0–149)
TSH SERPL DL<=0.005 MIU/L-ACNC: 2.26 UIU/ML (ref 0.27–4.2)
VIT B12 SERPL-MCNC: 394 PG/ML (ref 211–946)
WBC # BLD AUTO: 7.3 K/UL (ref 4.8–10.8)

## 2024-04-26 ENCOUNTER — OFFICE VISIT (OUTPATIENT)
Dept: INTERNAL MEDICINE | Age: 44
End: 2024-04-26
Payer: COMMERCIAL

## 2024-04-26 VITALS
WEIGHT: 176 LBS | DIASTOLIC BLOOD PRESSURE: 78 MMHG | BODY MASS INDEX: 34.55 KG/M2 | OXYGEN SATURATION: 99 % | HEIGHT: 60 IN | SYSTOLIC BLOOD PRESSURE: 128 MMHG | HEART RATE: 75 BPM

## 2024-04-26 DIAGNOSIS — F34.1 DYSTHYMIA: ICD-10-CM

## 2024-04-26 DIAGNOSIS — H93.11 TINNITUS, RIGHT: ICD-10-CM

## 2024-04-26 DIAGNOSIS — H90.71 MIXED CONDUCTIVE AND SENSORINEURAL HEARING LOSS OF RIGHT EAR WITH UNRESTRICTED HEARING OF LEFT EAR: ICD-10-CM

## 2024-04-26 DIAGNOSIS — C71.9 ANAPLASTIC ASTROCYTOMA (HCC): ICD-10-CM

## 2024-04-26 DIAGNOSIS — Z00.00 ANNUAL PHYSICAL EXAM: Primary | ICD-10-CM

## 2024-04-26 DIAGNOSIS — Z12.31 SCREENING MAMMOGRAM FOR BREAST CANCER: ICD-10-CM

## 2024-04-26 DIAGNOSIS — G40.909 SEIZURE DISORDER (HCC): ICD-10-CM

## 2024-04-26 DIAGNOSIS — E55.9 VITAMIN D DEFICIENCY: ICD-10-CM

## 2024-04-26 PROBLEM — B37.9 YEAST INFECTION: Status: RESOLVED | Noted: 2017-10-19 | Resolved: 2024-04-26

## 2024-04-26 PROCEDURE — 99396 PREV VISIT EST AGE 40-64: CPT | Performed by: INTERNAL MEDICINE

## 2024-04-26 RX ORDER — ACETAMINOPHEN 160 MG
2000 TABLET,DISINTEGRATING ORAL DAILY
Qty: 90 CAPSULE | Refills: 3
Start: 2024-04-26

## 2024-04-26 SDOH — ECONOMIC STABILITY: FOOD INSECURITY: WITHIN THE PAST 12 MONTHS, THE FOOD YOU BOUGHT JUST DIDN'T LAST AND YOU DIDN'T HAVE MONEY TO GET MORE.: NEVER TRUE

## 2024-04-26 SDOH — ECONOMIC STABILITY: FOOD INSECURITY: WITHIN THE PAST 12 MONTHS, YOU WORRIED THAT YOUR FOOD WOULD RUN OUT BEFORE YOU GOT MONEY TO BUY MORE.: NEVER TRUE

## 2024-04-26 SDOH — ECONOMIC STABILITY: INCOME INSECURITY: HOW HARD IS IT FOR YOU TO PAY FOR THE VERY BASICS LIKE FOOD, HOUSING, MEDICAL CARE, AND HEATING?: NOT HARD AT ALL

## 2024-04-26 NOTE — PROGRESS NOTES
follow-up with ENT    6. Mixed conductive and sensorineural hearing loss of right ear with unrestricted hearing of left ear  See above    7. Vitamin D deficiency  Check replace and follow  - vitamin D (VITAMIN D3) 50 MCG (2000 UT) CAPS capsule; Take 1 capsule by mouth daily  Dispense: 90 capsule; Refill: 3  - Vitamin D 25 Hydroxy; Future    8. Screening mammogram for breast cancer    - [facility] DIGITAL SCREEN W OR WO CAD BILATERAL; Future

## 2024-04-29 ENCOUNTER — TELEPHONE (OUTPATIENT)
Dept: NEUROLOGY | Age: 44
End: 2024-04-29

## 2024-04-29 ENCOUNTER — OFFICE VISIT (OUTPATIENT)
Dept: ENT CLINIC | Age: 44
End: 2024-04-29
Payer: COMMERCIAL

## 2024-04-29 VITALS
SYSTOLIC BLOOD PRESSURE: 128 MMHG | WEIGHT: 176 LBS | DIASTOLIC BLOOD PRESSURE: 72 MMHG | BODY MASS INDEX: 34.55 KG/M2 | HEIGHT: 60 IN

## 2024-04-29 DIAGNOSIS — H60.311 ACUTE DIFFUSE OTITIS EXTERNA OF RIGHT EAR: Primary | ICD-10-CM

## 2024-04-29 PROCEDURE — 99213 OFFICE O/P EST LOW 20 MIN: CPT | Performed by: PHYSICIAN ASSISTANT

## 2024-04-29 RX ORDER — OFLOXACIN 3 MG/ML
4 SOLUTION/ DROPS OPHTHALMIC 3 TIMES DAILY
Qty: 10 ML | Refills: 0 | Status: SHIPPED | OUTPATIENT
Start: 2024-04-29 | End: 2024-05-09

## 2024-04-29 RX ORDER — CEFUROXIME AXETIL 500 MG/1
500 TABLET ORAL 2 TIMES DAILY
Qty: 20 TABLET | Refills: 0 | Status: SHIPPED | OUTPATIENT
Start: 2024-04-29 | End: 2024-05-09

## 2024-04-29 NOTE — PROGRESS NOTES
Sue is a pleasant 44-year-old  female that presents with complaints of drainage from the right ear.  She reports that she was mowing the yard over the weekend and noticed quite a bit of drainage occurring.  She denies any pain or any issues with fever or chills.    Physical examination with the microscope revealed the patient to have purulent drainage to the right ear canal that was suctioned down to the TM.  She was noted with a 10% TM perforation in the central portion of the TM that actually was fairly dry to exam.  She was noted with cerumen/purulent debris that had blocked the perforation prior to suction.  Examination of the left ear demonstrated normal TM and canal.  Oral exam demonstrated the tongue to be midline with no abnormalities to the posterior pharynx.  Neck exam demonstrated no lymphadenopathy or thyromegaly.      Impression: Right otitis externa secondary to right TM perforation    Plan: I recommended placing the patient on ofloxacin eardrops as well as Ceftin to cover the middle ear due to the perforation.  She is to follow-up in 2 weeks for reevaluation.  She was reminded to call if she has any questions or concerns.      Electronically signed by GASPER CORLEY PA-C on 4/29/24 at 10:28 AM CDT

## 2024-04-30 NOTE — TELEPHONE ENCOUNTER
Called the patient back to reschedule their appointment with Dr simon for their drivers license form. The patient stated they will be out of town the last two weeks of June. She also stated that she had a seizure again on 04/25/24 and will have to push it out 3 more months. The patient appointment is scheduled on 08/05/24 with Jacklyn. The patient clarified with understanding about their appointment date and time.

## 2024-05-03 ENCOUNTER — HOSPITAL ENCOUNTER (OUTPATIENT)
Dept: WOMENS IMAGING | Age: 44
Discharge: HOME | End: 2024-05-03
Attending: INTERNAL MEDICINE
Payer: COMMERCIAL

## 2024-05-03 VITALS — WEIGHT: 155 LBS | HEIGHT: 60 IN | BODY MASS INDEX: 30.43 KG/M2

## 2024-05-03 DIAGNOSIS — Z12.31 SCREENING MAMMOGRAM FOR BREAST CANCER: ICD-10-CM

## 2024-05-03 PROCEDURE — 77063 BREAST TOMOSYNTHESIS BI: CPT

## 2024-05-06 ASSESSMENT — ENCOUNTER SYMPTOMS
BACK PAIN: 0
ABDOMINAL PAIN: 0
EYE REDNESS: 0
COUGH: 0
SHORTNESS OF BREATH: 0
EYE DISCHARGE: 0
ABDOMINAL DISTENTION: 0
SINUS PRESSURE: 0

## 2024-05-07 ENCOUNTER — TELEPHONE (OUTPATIENT)
Dept: GASTROENTEROLOGY | Age: 44
End: 2024-05-07

## 2024-05-07 NOTE — TELEPHONE ENCOUNTER
DOMINICK:  Called patient to remind them of their procedure with Dr. Trey Rider  at [facility]  on 5/10/24 to arrive at 11:00AM    CONFIRMED  PATIENT HAS PREP INSTRUCTIONS & PRESCRIPTION

## 2024-05-08 ENCOUNTER — OFFICE VISIT (OUTPATIENT)
Dept: ENT CLINIC | Age: 44
End: 2024-05-08
Payer: COMMERCIAL

## 2024-05-08 VITALS — HEIGHT: 60 IN | BODY MASS INDEX: 30.27 KG/M2 | DIASTOLIC BLOOD PRESSURE: 80 MMHG | SYSTOLIC BLOOD PRESSURE: 122 MMHG

## 2024-05-08 DIAGNOSIS — H72.91 PERFORATION OF RIGHT TYMPANIC MEMBRANE: ICD-10-CM

## 2024-05-08 DIAGNOSIS — H60.311 ACUTE DIFFUSE OTITIS EXTERNA OF RIGHT EAR: Primary | ICD-10-CM

## 2024-05-08 PROCEDURE — 99213 OFFICE O/P EST LOW 20 MIN: CPT | Performed by: NURSE PRACTITIONER

## 2024-05-08 RX ORDER — AMOXICILLIN AND CLAVULANATE POTASSIUM 875; 125 MG/1; MG/1
1 TABLET, FILM COATED ORAL 2 TIMES DAILY
Qty: 20 TABLET | Refills: 0 | Status: SHIPPED | OUTPATIENT
Start: 2024-05-08 | End: 2024-05-18

## 2024-05-08 RX ORDER — CIPROFLOXACIN AND DEXAMETHASONE 3; 1 MG/ML; MG/ML
4 SUSPENSION/ DROPS AURICULAR (OTIC) 2 TIMES DAILY
Qty: 7.5 ML | Refills: 0 | Status: SHIPPED | OUTPATIENT
Start: 2024-05-08 | End: 2024-05-15

## 2024-05-08 ASSESSMENT — ENCOUNTER SYMPTOMS
GASTROINTESTINAL NEGATIVE: 1
RESPIRATORY NEGATIVE: 1
EYES NEGATIVE: 1
ALLERGIC/IMMUNOLOGIC NEGATIVE: 1

## 2024-05-08 NOTE — PROGRESS NOTES
Cardiovascular:      Rate and Rhythm: Normal rate and regular rhythm.   Pulmonary:      Effort: Pulmonary effort is normal.      Breath sounds: Normal breath sounds.   Musculoskeletal:      Cervical back: Normal range of motion.   Skin:     General: Skin is warm and dry.   Neurological:      General: No focal deficit present.      Mental Status: She is alert and oriented to person, place, and time.   Psychiatric:         Mood and Affect: Mood normal.         Behavior: Behavior normal.              ASSESSMENT/PLAN:    1. Acute diffuse otitis externa of right ear  -     ciprofloxacin-dexAMETHasone (CIPRODEX) 0.3-0.1 % otic suspension; Place 4 drops into the right ear 2 times daily for 7 days, Disp-7.5 mL, R-0Normal  -     amoxicillin-clavulanate (AUGMENTIN) 875-125 MG per tablet; Take 1 tablet by mouth 2 times daily for 10 days, Disp-20 tablet, R-0Normal  2. Perforation of right tympanic membrane    Start Augmentin and Ciprodex drops for right ear. Stop Ceftin and ofloxacin drops. Keep follow-up appointment scheduled for next week, sooner if needed.    Return in about 1 week (around 5/15/2024).    An electronic signature was used to authenticate this note.    GHISLAINE Plata - CNP       Please note that this chart was generated using dragon dictation software.  Although every effort was made to ensure the accuracy of this automated transcription, some errors in transcription may have occurred.

## 2024-05-10 ENCOUNTER — ANESTHESIA (OUTPATIENT)
Dept: ENDOSCOPY | Age: 44
End: 2024-05-10
Payer: COMMERCIAL

## 2024-05-10 ENCOUNTER — ANESTHESIA EVENT (OUTPATIENT)
Dept: ENDOSCOPY | Age: 44
End: 2024-05-10
Payer: COMMERCIAL

## 2024-05-10 ENCOUNTER — HOSPITAL ENCOUNTER (OUTPATIENT)
Age: 44
Setting detail: OUTPATIENT SURGERY
Discharge: HOME | End: 2024-05-10
Attending: INTERNAL MEDICINE | Admitting: INTERNAL MEDICINE
Payer: COMMERCIAL

## 2024-05-10 VITALS
RESPIRATION RATE: 18 BRPM | DIASTOLIC BLOOD PRESSURE: 89 MMHG | HEART RATE: 64 BPM | OXYGEN SATURATION: 100 % | TEMPERATURE: 97.8 F | SYSTOLIC BLOOD PRESSURE: 127 MMHG

## 2024-05-10 LAB
HCG, URINE, POC: NEGATIVE
Lab: NORMAL
NEGATIVE QC PASS/FAIL: NORMAL
POSITIVE QC PASS/FAIL: NORMAL

## 2024-05-10 PROCEDURE — 6360000002 HC RX W HCPCS: Performed by: NURSE ANESTHETIST, CERTIFIED REGISTERED

## 2024-05-10 PROCEDURE — 3700000001 HC ADD 15 MINUTES (ANESTHESIA): Performed by: INTERNAL MEDICINE

## 2024-05-10 PROCEDURE — 7100000011 HC PHASE II RECOVERY - ADDTL 15 MIN: Performed by: INTERNAL MEDICINE

## 2024-05-10 PROCEDURE — 3609027000 HC COLONOSCOPY: Performed by: INTERNAL MEDICINE

## 2024-05-10 PROCEDURE — 2580000003 HC RX 258: Performed by: INTERNAL MEDICINE

## 2024-05-10 PROCEDURE — 2500000003 HC RX 250 WO HCPCS: Performed by: NURSE ANESTHETIST, CERTIFIED REGISTERED

## 2024-05-10 PROCEDURE — 45378 DIAGNOSTIC COLONOSCOPY: CPT | Performed by: INTERNAL MEDICINE

## 2024-05-10 PROCEDURE — 3700000000 HC ANESTHESIA ATTENDED CARE: Performed by: INTERNAL MEDICINE

## 2024-05-10 PROCEDURE — 2709999900 HC NON-CHARGEABLE SUPPLY: Performed by: INTERNAL MEDICINE

## 2024-05-10 PROCEDURE — 7100000010 HC PHASE II RECOVERY - FIRST 15 MIN: Performed by: INTERNAL MEDICINE

## 2024-05-10 RX ORDER — SODIUM CHLORIDE, SODIUM LACTATE, POTASSIUM CHLORIDE, CALCIUM CHLORIDE 600; 310; 30; 20 MG/100ML; MG/100ML; MG/100ML; MG/100ML
INJECTION, SOLUTION INTRAVENOUS CONTINUOUS
Status: DISCONTINUED | OUTPATIENT
Start: 2024-05-10 | End: 2024-05-10 | Stop reason: HOSPADM

## 2024-05-10 RX ORDER — PROPOFOL 10 MG/ML
INJECTION, EMULSION INTRAVENOUS PRN
Status: DISCONTINUED | OUTPATIENT
Start: 2024-05-10 | End: 2024-05-10 | Stop reason: SDUPTHER

## 2024-05-10 RX ORDER — LIDOCAINE HYDROCHLORIDE 10 MG/ML
INJECTION, SOLUTION INFILTRATION; PERINEURAL PRN
Status: DISCONTINUED | OUTPATIENT
Start: 2024-05-10 | End: 2024-05-10 | Stop reason: SDUPTHER

## 2024-05-10 RX ORDER — MIDAZOLAM HYDROCHLORIDE 1 MG/ML
INJECTION INTRAMUSCULAR; INTRAVENOUS PRN
Status: DISCONTINUED | OUTPATIENT
Start: 2024-05-10 | End: 2024-05-10 | Stop reason: SDUPTHER

## 2024-05-10 RX ADMIN — MIDAZOLAM 2 MG: 1 INJECTION INTRAMUSCULAR; INTRAVENOUS at 12:11

## 2024-05-10 RX ADMIN — SODIUM CHLORIDE, POTASSIUM CHLORIDE, SODIUM LACTATE AND CALCIUM CHLORIDE: 600; 310; 30; 20 INJECTION, SOLUTION INTRAVENOUS at 11:37

## 2024-05-10 RX ADMIN — LIDOCAINE HYDROCHLORIDE 40 MG: 10 INJECTION, SOLUTION INFILTRATION; PERINEURAL at 12:13

## 2024-05-10 RX ADMIN — PROPOFOL 240 MG: 10 INJECTION, EMULSION INTRAVENOUS at 12:13

## 2024-05-10 ASSESSMENT — PAIN - FUNCTIONAL ASSESSMENT
PAIN_FUNCTIONAL_ASSESSMENT: NONE - DENIES PAIN

## 2024-05-10 NOTE — H&P
Patient Name: Sue Faria  : 1980  MRN: [id number]  DATE: 05/10/24    Allergies: No Known Allergies     ENDOSCOPY  History and Physical    Procedure:    [] Diagnostic Colonoscopy       [x] Screening Colonoscopy  [] EGD      [] ERCP      [] EUS       [] Other    [x] Previous office notes/History and Physical reviewed from the patients chart. Please see EMR for further details of HPI. I have examined the patient's status immediately prior to the procedure and:      Indications/HPI:       [x] Screening              [] History of Polyps      []Fhx of colon CA/polyps []+Cologard/DNA/Stool Testing      Anesthesia:   [x] MAC [] Moderate Sedation   [] General   [] None     ROS: 12 pt review of systems was negative unless stated above    Medications:   Prior to Admission medications    Medication Sig Start Date End Date Taking? Authorizing Provider   ciprofloxacin-dexAMETHasone (CIPRODEX) 0.3-0.1 % otic suspension Place 4 drops into the right ear 2 times daily for 7 days 5/8/24 5/15/24  Twila Srinivasan APRN - CNP   amoxicillin-clavulanate (AUGMENTIN) 875-125 MG per tablet Take 1 tablet by mouth 2 times daily for 10 days 24  Twila Srinivasan APRN - CNP   vitamin D (VITAMIN D3) 50 MCG (2000 UT) CAPS capsule Take 1 capsule by mouth daily 24   Khalida Britt MD   topiramate (TOPAMAX) 100 MG tablet Take 1 tablet by mouth 2 times daily 3/18/24   Caleb Villasenor MD   buPROPion (WELLBUTRIN XL) 150 MG extended release tablet TAKE 1 TABLET BY MOUTH EVERY DAY IN THE MORNING 21  Khalida Britt MD   aspirin 81 MG tablet Take 1 tablet by mouth daily    Catherine Sanders MD   ferrous sulfate 325 (65 Fe) MG tablet Take 1 tablet by mouth daily (with breakfast)    Catherine Sanders MD   OXcarbazepine (TRILEPTAL) 300 MG tablet Take 1 tablet by mouth 2 in the am and 2q hs    Catherine Sanders MD       Past Medical History:  Past Medical History:   Diagnosis Date    Anaplastic glioma of  brain (HCC)     Anxiety 6/28/2017    Basal cell carcinoma     Dysthymia 6/28/2017    Seizure disorder (HCC) 6/28/2017       Past Surgical History:  Past Surgical History:   Procedure Laterality Date    BREAST BIOPSY Left 2006    benign    BREAST BIOPSY Left 2010    benign    CRANIOTOMY      SKIN CANCER EXCISION         Social History:  Social History     Tobacco Use    Smoking status: Never    Smokeless tobacco: Never   Vaping Use    Vaping Use: Never used   Substance Use Topics    Alcohol use: Never    Drug use: No       Vital Signs:   Vitals:    05/10/24 1109   BP: 128/81   Pulse: 70   Resp: 16   Temp: 97.1 °F (36.2 °C)   SpO2: 99%        Physical Exam:  Cardiac:  [x]WNL []Comments:  Pulmonary:  [x]WNL   []Comments:  Neuro/Mental Status:  [x]WNL  []Comments:  Abdominal:  [x]WNL    []Comments:  Other:   []WNL  []Comments:    Informed Consent:  The risks and benefits of the procedure have been discussed with either the patient or if they cannot consent, their representative.    Assessment:  Patient examined and appropriate for planned sedation and procedure.     Plan:  Proceed with planned sedation and procedure as above.    Trey Rider MD

## 2024-05-10 NOTE — ANESTHESIA POSTPROCEDURE EVALUATION
Department of Anesthesiology  Postprocedure Note    Patient: Sue Faria  MRN: [id number]  YOB: 1980  Date of evaluation: 5/10/2024    Procedure Summary       Date: 05/10/24 Room / Location: [place] [address] / [facility]    Anesthesia Start: 1207 Anesthesia Stop:     Procedure: COLORECTAL CANCER SCREENING, NOT HIGH RISK Diagnosis:       Screen for colon cancer      (Screen for colon cancer [Z12.11])    Surgeons: Trey Rider MD Responsible Provider: Kate Ricardo APRN - CRNA    Anesthesia Type: general, TIVA ASA Status: 3            Anesthesia Type: No value filed.    Charito Phase I: Charito Score: 10    Charito Phase II:      Anesthesia Post Evaluation    Patient location during evaluation: bedside  Patient participation: complete - patient participated  Level of consciousness: sleepy but conscious  Pain score: 0  Airway patency: patent  Nausea & Vomiting: no nausea and no vomiting  Cardiovascular status: hemodynamically stable and blood pressure returned to baseline  Respiratory status: acceptable and nasal cannula  Hydration status: stable  Pain management: adequate    No notable events documented.

## 2024-05-10 NOTE — OP NOTE
Patient: Sue Faria : 1980  [facility] Rec#: [id number] Acc#: [id number]   Primary Care Provider Khalida Britt MD    Date of Procedure:  5/10/2024    Endoscopist: Trey Rider MD    Referring Provider: Khalida Britt MD,     Operation Performed: Colonoscopy     Indications: screening    Anesthesia:  Sedation was administered by anesthesia who monitored the patient during the procedure.    I met with Sue Faria prior to procedure. We discussed the procedure itself, and I have discussed the risks of endoscopy (including-- but not limited to-- pain, discomfort, bleeding potentially requiring second endoscopic procedure and/or blood transfusion, organ perforation requiring operative repair, damage to organs near the colon, infection, aspiration, cardiopulmonary/allergic reaction), benefits, indications to endoscopy. Additionally, we discussed options other than colonoscopy. The patient expressed understanding. All questions answered. The patient decided to proceed with the procedure.  Signed informed consent was placed on the chart.    Blood Loss: minimal    Withdrawal time: > 6 minutes  Bowel Prep: adequate     Complications: no immediate complications    DESCRIPTION OF PROCEDURE:     A time out was performed. After written informed consent was obtained, the patient was placed in the left lateral position.     The perianal area was inspected, and a digital rectal exam was performed. A rectal exam was performed: normal tone, no palpable lesions. At this point, a forward viewing Olympus colonoscope was inserted into the anus and carefully advanced to the cecum.  The cecum was identified by the ileocecal valve and the appendiceal orifice. The colonoscope was then slowly withdrawn with careful inspection of the mucosa in a linear and circumferential fashion. The scope was retroflexed in the rectum. Suction was utilized during the procedure to remove as much air as possible from the bowel. The colonoscope was

## 2024-05-10 NOTE — ANESTHESIA PRE PROCEDURE
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   • Basal cell carcinoma of right lower eyelid C44.1122   • Depression F32.A   • Rash R21       Past Medical History:        Diagnosis Date   • Anaplastic glioma of brain (HCC)    • Anxiety 6/28/2017   • Basal cell carcinoma    • Dysthymia 6/28/2017   • Seizure disorder (HCC) 6/28/2017       Past Surgical History:        Procedure Laterality Date   • BREAST BIOPSY Left 2006    benign   • BREAST BIOPSY Left 2010    benign   • CRANIOTOMY     • SKIN CANCER EXCISION         Social History:    Social History     Tobacco Use   • Smoking status: Never   • Smokeless tobacco: Never   Substance Use Topics   • Alcohol use: Never                                Counseling given: Not Answered      Vital Signs (Current):   Vitals:    05/10/24 1109   BP: 128/81   Pulse: 70   Resp: 16   Temp: 97.1 °F (36.2 °C)   TempSrc: Temporal   SpO2: 99%                                              BP Readings from Last 3 Encounters:   05/10/24 128/81   05/08/24 122/80   04/29/24 128/72       NPO Status: Time of last liquid consumption: 0745                        Time of last solid consumption: 1730                        Date of last liquid consumption: 05/10/24                        Date of last solid food consumption: 05/08/24    BMI:   Wt Readings from Last 3 Encounters:   05/03/24 70.3 kg (155 lb)   04/29/24 79.8 kg (176 lb)   04/26/24 79.8 kg (176 lb)     There is no height or weight on file to calculate BMI.    CBC:   Lab Results   Component Value Date/Time    WBC 7.3 04/19/2024 07:44 AM    RBC 3.96 04/19/2024 07:44 AM    HGB 11.6 04/19/2024 07:44 AM    HCT 36.5 04/19/2024 07:44 AM    MCV 92.2 04/19/2024 07:44 AM    RDW 12.9 04/19/2024 07:44 AM     04/19/2024 07:44 AM       CMP:   Lab

## 2024-05-10 NOTE — DISCHARGE INSTRUCTIONS
Recommendations:  1. Repeat colonoscopy - 10 yrs for screening    Colonoscopy: What to Expect at Home  Your Recovery    After the test, you may be bloated or have gas pains. You may need to pass gas. If a biopsy was done or a polyp was removed, you may have streaks of blood in your stool (feces) for a few days. Problems such as heavy rectal bleeding may not occur until several weeks after the test. This isn't common. But it can happen after polyps are removed.  This care sheet gives you a general idea about how long it will take for you to recover. But each person recovers at a different pace. Follow the steps below to get better as quickly as possible.    How can you care for yourself at home?  Activity    Rest when you feel tired.     You can do your normal activities when it feels okay to do so.   Diet    You may resume your regular diet.     Drink plenty of fluids. This helps to replace the fluids that were lost during the colon prep.     Do not drink alcohol.   Medicines    Your doctor will tell you if and when you can restart your medicines. You will also be given instructions about taking any new medicines.     If you stopped taking aspirin or some other blood thinner, your doctor will tell you when to start taking it again.     If polyps were removed or a biopsy was done during the test, your doctor may tell you not to take aspirin or other anti-inflammatory medicines for a few days. These include ibuprofen (Advil, Motrin) and naproxen (Aleve).   Other instructions    For your safety, do not drive or operate machinery for 24 hours.     Do not sign legal documents or make major decisions until the medicine wears off and you can think clearly. The anesthesia can make it hard for you to fully understand what you are agreeing to, and cause impaired judgement.     When should you call for help?   Call 911 anytime you think you may need emergency care. For example, call if:    You passed out (lost

## 2024-05-14 ENCOUNTER — TELEPHONE (OUTPATIENT)
Dept: INTERNAL MEDICINE | Age: 44
End: 2024-05-14

## 2024-05-14 RX ORDER — FLUCONAZOLE 200 MG/1
200 TABLET ORAL DAILY
Qty: 3 TABLET | Refills: 0 | Status: SHIPPED | OUTPATIENT
Start: 2024-05-14 | End: 2024-05-17

## 2024-05-14 NOTE — TELEPHONE ENCOUNTER
Patient calling 5/14/24 requesting a refill of their fluconazole (diflucan) 150mg medication. Patient would like it sent to [facility]/pharmacy #[id number] - [place], [place] - [address] -  [phone] - F [phone] .

## 2024-05-15 ENCOUNTER — OFFICE VISIT (OUTPATIENT)
Dept: ENT CLINIC | Age: 44
End: 2024-05-15
Payer: COMMERCIAL

## 2024-05-15 VITALS
WEIGHT: 155 LBS | HEIGHT: 60 IN | SYSTOLIC BLOOD PRESSURE: 128 MMHG | DIASTOLIC BLOOD PRESSURE: 70 MMHG | BODY MASS INDEX: 30.43 KG/M2

## 2024-05-15 DIAGNOSIS — H60.311 ACUTE DIFFUSE OTITIS EXTERNA OF RIGHT EAR: Primary | ICD-10-CM

## 2024-05-15 PROCEDURE — 99213 OFFICE O/P EST LOW 20 MIN: CPT | Performed by: PHYSICIAN ASSISTANT

## 2024-05-15 NOTE — PROGRESS NOTES
Sue is a pleasant 44-year-old  female that presents for a 2-week follow-up after treatment for right otitis externa.  She reported that her ear worsen last week and she was seen by Twila.  She was placed on a different oral antibiotic as well as different antibiotic drops.  Currently she reports that she is still having some drainage from the ear.  She denies any issues with fever or chills.      Physical examination with microscope revealed the patient have a normal TM and canal to the left side.  Right side demonstrated the infection to be resolved with some residual medication noted to the right side of the TM that was suction with no complications.  Overall the TM was noted to be about 10% or less at the 6 o'clock position.  No drainage was noted from the perforation site.  A culture was obtained of the ear canal to ensure no further growth.  Neck exam demonstrated no lymphadenopathy or thyromegaly.      Impression: Clinically resolving right otitis externa    Plan: I will call the results of the culture once this has been completed.  Overall anticipate that this should reveal no growth.  If this is noted be normal, she is to follow-up with me in approximate 2 to 3 months for the perforation.  She was reminded to call if she has any questions or concerns.      Electronically signed by GASPER CORLEY PA-C on 5/15/24 at 5:25 PM CDT

## 2024-05-19 LAB
BACTERIA SPEC AEROBE CULT: NORMAL
GRAM STN SPEC: NORMAL

## 2024-05-28 RX ORDER — ACETIC ACID 20.65 MG/ML
4 SOLUTION AURICULAR (OTIC) 3 TIMES DAILY
Qty: 15 ML | Refills: 1 | Status: SHIPPED | OUTPATIENT
Start: 2024-05-28 | End: 2024-06-04

## 2024-05-28 NOTE — TELEPHONE ENCOUNTER
See [employer] Message    Electronically signed by GASPER CORLEY PA-C on 5/28/24 at 4:57 PM CDT

## 2024-07-03 ENCOUNTER — OFFICE VISIT (OUTPATIENT)
Dept: ENT CLINIC | Age: 44
End: 2024-07-03
Payer: COMMERCIAL

## 2024-07-03 VITALS
SYSTOLIC BLOOD PRESSURE: 118 MMHG | DIASTOLIC BLOOD PRESSURE: 74 MMHG | HEIGHT: 60 IN | WEIGHT: 174 LBS | BODY MASS INDEX: 34.16 KG/M2

## 2024-07-03 DIAGNOSIS — H66.004 RECURRENT ACUTE SUPPURATIVE OTITIS MEDIA OF RIGHT EAR WITHOUT SPONTANEOUS RUPTURE OF TYMPANIC MEMBRANE: Primary | ICD-10-CM

## 2024-07-03 DIAGNOSIS — H66.004 RECURRENT ACUTE SUPPURATIVE OTITIS MEDIA OF RIGHT EAR WITHOUT SPONTANEOUS RUPTURE OF TYMPANIC MEMBRANE: ICD-10-CM

## 2024-07-03 PROCEDURE — 99213 OFFICE O/P EST LOW 20 MIN: CPT | Performed by: PHYSICIAN ASSISTANT

## 2024-07-03 RX ORDER — OFLOXACIN 3 MG/ML
4 SOLUTION/ DROPS OPHTHALMIC 3 TIMES DAILY
Qty: 10 ML | Refills: 0 | Status: SHIPPED | OUTPATIENT
Start: 2024-07-03 | End: 2024-07-13

## 2024-07-03 RX ORDER — CLINDAMYCIN HYDROCHLORIDE 300 MG/1
300 CAPSULE ORAL 3 TIMES DAILY
Qty: 30 CAPSULE | Refills: 0 | Status: SHIPPED | OUTPATIENT
Start: 2024-07-03 | End: 2024-07-13

## 2024-07-03 NOTE — PROGRESS NOTES
Sue is a pleasant 44-year-old  female that presents for evaluation of the right ear due to increased purulent drainage from the ear.  She reports that over the last several weeks she has felt pressure that started draining about a week ago.  She denies any issues with dizziness or any additional problems.      Physical examination with microscope revealed the patient to have a small amount of cerumen to the left ear that was removed with alligator graspers with no complications.  After disimpaction, the TM appeared to be normal.  The right ear demonstrated purulent material to be present through the canal.  Due to her multiple treatments in the past, a culture of the drainage was performed and then the purulent material was suctioned down to the TM.  She was still noted to have a pinpoint TM perforation at the 5 o'clock position with purulent drainage noted from the middle ear.  The TM was noted to be moderately erythematous.  Neck exam demonstrated no lymphadenopathy or thyromegaly.  Oral exam was unrevealing.      Impression: Recurrent right otitis media/otitis externa likely secondary to TM perforation    Plan: I will call the patient the culture results once completed.  Due to the holiday weekend, I will go ahead and call her in 10 days of clindamycin and ofloxacin eardrops.  She was reminded to call if she has any questions or concerns.  She is to follow-up in clinic in 2 weeks for reevaluation.      Electronically signed by GASPER CORLEY PA-C on 7/3/24 at 2:04 PM CDT

## 2024-07-05 ENCOUNTER — TELEPHONE (OUTPATIENT)
Dept: ENT CLINIC | Age: 44
End: 2024-07-05

## 2024-07-05 LAB
BACTERIA SPEC AEROBE CULT: ABNORMAL
GRAM STN SPEC: ABNORMAL
ORGANISM: ABNORMAL

## 2024-07-05 RX ORDER — FLUCONAZOLE 150 MG/1
150 TABLET ORAL
Qty: 2 TABLET | Refills: 0 | Status: SHIPPED | OUTPATIENT
Start: 2024-07-05 | End: 2024-07-11

## 2024-07-05 RX ORDER — AMOXICILLIN AND CLAVULANATE POTASSIUM 875; 125 MG/1; MG/1
1 TABLET, FILM COATED ORAL 2 TIMES DAILY
Qty: 20 TABLET | Refills: 0 | Status: SHIPPED | OUTPATIENT
Start: 2024-07-05 | End: 2024-07-15

## 2024-07-05 NOTE — TELEPHONE ENCOUNTER
I called the culture results from the right ear.  Patient was noted with a clindamycin resistance with the rest noted be sensitive.  Advised the patient to discontinue the clindamycin and we will switch her over to Augmentin for 10 days.  She was sensitive to the mila quinolones.  Advised patient to continue using the ofloxacin drops as prescribed.  She is follow-up in 2 weeks for reevaluation.      Electronically signed by GASPER CORLEY PA-C on 7/5/24 at 1:53 PM CDT

## 2024-07-08 RX ORDER — OFLOXACIN 3 MG/ML
4 SOLUTION/ DROPS OPHTHALMIC 3 TIMES DAILY
Qty: 10 ML | Refills: 0 | Status: SHIPPED | OUTPATIENT
Start: 2024-07-08 | End: 2024-07-18

## 2024-07-09 RX ORDER — OFLOXACIN 3 MG/ML
4 SOLUTION/ DROPS OPHTHALMIC 3 TIMES DAILY
Qty: 10 ML | Refills: 0 | OUTPATIENT
Start: 2024-07-09 | End: 2024-07-19

## 2024-07-09 NOTE — TELEPHONE ENCOUNTER
Patient called and lvm requesting medication to be refilled, she states she has requested it two times and has not heard anything back

## 2024-07-16 ENCOUNTER — TELEPHONE (OUTPATIENT)
Dept: NEUROSURGERY | Age: 44
End: 2024-07-16

## 2024-07-16 NOTE — TELEPHONE ENCOUNTER
Patient will call back with a date that will work for her rescheduled appointment with Dr. Villasenor on 08/05/2024.    Initial (On Arrival)

## 2024-07-24 ENCOUNTER — OFFICE VISIT (OUTPATIENT)
Dept: ENT CLINIC | Age: 44
End: 2024-07-24
Payer: COMMERCIAL

## 2024-07-24 VITALS
BODY MASS INDEX: 34.16 KG/M2 | WEIGHT: 174 LBS | DIASTOLIC BLOOD PRESSURE: 72 MMHG | HEIGHT: 60 IN | SYSTOLIC BLOOD PRESSURE: 126 MMHG

## 2024-07-24 DIAGNOSIS — H72.91 PERFORATION OF RIGHT TYMPANIC MEMBRANE: ICD-10-CM

## 2024-07-24 DIAGNOSIS — H66.004 RECURRENT ACUTE SUPPURATIVE OTITIS MEDIA OF RIGHT EAR WITHOUT SPONTANEOUS RUPTURE OF TYMPANIC MEMBRANE: Primary | ICD-10-CM

## 2024-07-24 PROCEDURE — 99213 OFFICE O/P EST LOW 20 MIN: CPT | Performed by: PHYSICIAN ASSISTANT

## 2024-07-24 NOTE — PROGRESS NOTES
Sue is a pleasant 44-year-old  female that presents for a 2-week follow-up after treatment for right-sided otitis media/otitis externa.  Patient has a history of having a TM perforation that was felt to contribute to the infection.  Overall she reports that she feels much improved and is only having a little bit of drainage from the canal.  She also complains of having sporadic nosebleeds and is requesting evaluation.      Physical examination of the right ear demonstrated medication residue to be at the floor of the canal that was suctioned down to the TM.  Overall the infection was noted to be totally resolved with the perforation still noted at about 10 to 15% at the 6 o'clock position.  This was noted to be dry with granulation tissue noted to be present to the left side of the perforation.  Examination of the left ear demonstrated a normal TM and canal.  Neck exam demonstrated no lymphadenopathy or thyromegaly.  Nasal exam demonstrated no evidence of a nasal septal ulceration bilaterally with no evidence of any bleeding from either nare.  Oral exam demonstrated the tongue to be midline with no bleeding to the posterior pharynx with no masses.      Impression: Resolved right sided otitis media/otitis externa with TM perforation    Plan: I reminded the patient to keep the right ear dry when she showers or swims due to risk of recurrent infection.  She is to follow-up in 3 months for cerumen check and for evaluation of the TM perforation.  Due to the size I am hopeful this will heal without any type of surgical intervention.  She was reminded to call if she has any questions or concerns.      Electronically signed by GASPER CORLEY PA-C on 7/24/24 at 1:35 PM MARELYT

## 2024-08-22 RX ORDER — OFLOXACIN 3 MG/ML
4 SOLUTION/ DROPS OPHTHALMIC 3 TIMES DAILY
Qty: 10 ML | Refills: 1 | Status: SHIPPED | OUTPATIENT
Start: 2024-08-22 | End: 2024-09-01

## 2024-08-22 NOTE — TELEPHONE ENCOUNTER
See [employer] message  Electronically signed by GASPER CORLEY PA-C on 8/22/24 at 2:02 PM CDT

## 2024-08-27 ENCOUNTER — OFFICE VISIT (OUTPATIENT)
Dept: ENT CLINIC | Age: 44
End: 2024-08-27
Payer: COMMERCIAL

## 2024-08-27 VITALS
HEIGHT: 60 IN | BODY MASS INDEX: 33.77 KG/M2 | WEIGHT: 172 LBS | DIASTOLIC BLOOD PRESSURE: 76 MMHG | SYSTOLIC BLOOD PRESSURE: 118 MMHG

## 2024-08-27 DIAGNOSIS — H60.311 ACUTE DIFFUSE OTITIS EXTERNA OF RIGHT EAR: ICD-10-CM

## 2024-08-27 DIAGNOSIS — H66.004 RECURRENT ACUTE SUPPURATIVE OTITIS MEDIA OF RIGHT EAR WITHOUT SPONTANEOUS RUPTURE OF TYMPANIC MEMBRANE: Primary | ICD-10-CM

## 2024-08-27 DIAGNOSIS — H66.004 RECURRENT ACUTE SUPPURATIVE OTITIS MEDIA OF RIGHT EAR WITHOUT SPONTANEOUS RUPTURE OF TYMPANIC MEMBRANE: ICD-10-CM

## 2024-08-27 PROCEDURE — 99213 OFFICE O/P EST LOW 20 MIN: CPT | Performed by: PHYSICIAN ASSISTANT

## 2024-08-27 RX ORDER — FLUCONAZOLE 100 MG/1
100 TABLET ORAL DAILY
Qty: 14 TABLET | Refills: 0 | Status: SHIPPED | OUTPATIENT
Start: 2024-08-27 | End: 2024-09-10

## 2024-08-27 NOTE — PROGRESS NOTES
Sue is a pleasant 44-year-old  female that presents to the clinic with complaints of recurrent drainage from the right ear.  She reports that the ear feels stopped up like she has cotton in her ears.  She denies any issues with dizziness or any additional problems.      Physical examination with the microscope revealed the patient to have a normal TM and canal to the left side.  Right side demonstrated purulent material to the canal that appeared to be fungal based on appearance.  No fungal elements were detected.  The patient was noted to have about a 30% perforation centrally that appears to be stable with questionable drainage from the middle ear.  A culture was obtained of the drainage.  I suctioned the discharge down to the TM with no complications.  Neck exam demonstrated no lymphadenopathy or thyromegaly.  Oral exam demonstrated no lesions to the surface of the tongue or any abnormality to the posterior pharynx.      Impression: Recurrent right otitis externa-probable fungal based on history    Plan: I will start the patient on oral Diflucan as well as compounded Lotrimin solution.  I will call her the culture results once completed if adjustments need to be made.  She is to follow-up in 2 to 3 weeks for reevaluation.      Electronically signed by GASPER CORLEY PA-C on 8/27/24 at 10:32 AM CDT

## 2024-08-29 ENCOUNTER — TELEPHONE (OUTPATIENT)
Dept: ENT CLINIC | Age: 44
End: 2024-08-29

## 2024-08-29 LAB
BACTERIA SPEC AEROBE CULT: ABNORMAL
GRAM STN SPEC: ABNORMAL
ORGANISM: ABNORMAL

## 2024-08-29 NOTE — TELEPHONE ENCOUNTER
Patient notified that the culture of the ear was positive for MRSA with a fungal still pending.  Will place her on vancomycin optic drops compounded by [facility].   She is follow-up in 2 weeks for reevaluation of the ear.      Electronically signed by GASPER CORLEY PA-C on 8/29/24 at 1:16 PM CDT

## 2024-09-05 ENCOUNTER — TELEPHONE (OUTPATIENT)
Dept: NEUROLOGY | Age: 44
End: 2024-09-05

## 2024-09-12 ENCOUNTER — OFFICE VISIT (OUTPATIENT)
Dept: ENT CLINIC | Age: 44
End: 2024-09-12
Payer: COMMERCIAL

## 2024-09-12 VITALS
BODY MASS INDEX: 34.24 KG/M2 | WEIGHT: 174.4 LBS | HEIGHT: 60 IN | DIASTOLIC BLOOD PRESSURE: 74 MMHG | HEART RATE: 75 BPM | SYSTOLIC BLOOD PRESSURE: 118 MMHG | OXYGEN SATURATION: 98 %

## 2024-09-12 DIAGNOSIS — H65.91 RIGHT NON-SUPPURATIVE OTITIS MEDIA: Primary | ICD-10-CM

## 2024-09-12 PROCEDURE — 99212 OFFICE O/P EST SF 10 MIN: CPT | Performed by: PHYSICIAN ASSISTANT

## 2024-09-12 RX ORDER — SULFAMETHOXAZOLE/TRIMETHOPRIM 800-160 MG
1 TABLET ORAL 2 TIMES DAILY
Qty: 20 TABLET | Refills: 0 | Status: SHIPPED | OUTPATIENT
Start: 2024-09-12 | End: 2024-09-22

## 2024-09-24 ENCOUNTER — OFFICE VISIT (OUTPATIENT)
Dept: NEUROLOGY | Age: 44
End: 2024-09-24
Payer: COMMERCIAL

## 2024-09-24 VITALS
BODY MASS INDEX: 33.38 KG/M2 | RESPIRATION RATE: 16 BRPM | DIASTOLIC BLOOD PRESSURE: 85 MMHG | WEIGHT: 170 LBS | HEART RATE: 74 BPM | HEIGHT: 60 IN | SYSTOLIC BLOOD PRESSURE: 120 MMHG

## 2024-09-24 DIAGNOSIS — C71.0: ICD-10-CM

## 2024-09-24 DIAGNOSIS — G40.919 INTRACTABLE SEIZURES (HCC): Primary | ICD-10-CM

## 2024-09-24 PROCEDURE — 99214 OFFICE O/P EST MOD 30 MIN: CPT | Performed by: PSYCHIATRY & NEUROLOGY

## 2024-09-26 ENCOUNTER — OFFICE VISIT (OUTPATIENT)
Dept: ENT CLINIC | Age: 44
End: 2024-09-26
Payer: COMMERCIAL

## 2024-09-26 VITALS
WEIGHT: 170 LBS | SYSTOLIC BLOOD PRESSURE: 128 MMHG | BODY MASS INDEX: 33.38 KG/M2 | DIASTOLIC BLOOD PRESSURE: 82 MMHG | HEIGHT: 60 IN

## 2024-09-26 DIAGNOSIS — H72.91 PERFORATION OF RIGHT TYMPANIC MEMBRANE: ICD-10-CM

## 2024-09-26 DIAGNOSIS — H66.004 RECURRENT ACUTE SUPPURATIVE OTITIS MEDIA OF RIGHT EAR WITHOUT SPONTANEOUS RUPTURE OF TYMPANIC MEMBRANE: Primary | ICD-10-CM

## 2024-09-26 PROCEDURE — 99213 OFFICE O/P EST LOW 20 MIN: CPT | Performed by: PHYSICIAN ASSISTANT

## 2024-10-21 DIAGNOSIS — E55.9 VITAMIN D DEFICIENCY: ICD-10-CM

## 2024-10-21 DIAGNOSIS — Z00.00 ANNUAL PHYSICAL EXAM: ICD-10-CM

## 2024-10-21 LAB
25(OH)D3 SERPL-MCNC: 27.2 NG/ML
ALBUMIN SERPL-MCNC: 4.4 G/DL (ref 3.5–5.2)
ALP SERPL-CCNC: 105 U/L (ref 35–104)
ALT SERPL-CCNC: 13 U/L (ref 5–33)
ANION GAP SERPL CALCULATED.3IONS-SCNC: 11 MMOL/L (ref 7–19)
AST SERPL-CCNC: 14 U/L (ref 5–32)
BILIRUB SERPL-MCNC: 0.2 MG/DL (ref 0.2–1.2)
BUN SERPL-MCNC: 10 MG/DL (ref 6–20)
CALCIUM SERPL-MCNC: 9.6 MG/DL (ref 8.6–10)
CHLORIDE SERPL-SCNC: 102 MMOL/L (ref 98–111)
CHOLEST SERPL-MCNC: 178 MG/DL (ref 0–199)
CO2 SERPL-SCNC: 22 MMOL/L (ref 22–29)
CREAT SERPL-MCNC: 0.6 MG/DL (ref 0.5–0.9)
ERYTHROCYTE [DISTWIDTH] IN BLOOD BY AUTOMATED COUNT: 12.9 % (ref 11.5–14.5)
GLUCOSE SERPL-MCNC: 92 MG/DL (ref 70–99)
HBA1C MFR BLD: 5.2 % (ref 4–5.6)
HCT VFR BLD AUTO: 37.3 % (ref 37–47)
HDLC SERPL-MCNC: 50 MG/DL (ref 40–60)
HGB BLD-MCNC: 11.7 G/DL (ref 12–16)
LDLC SERPL CALC-MCNC: 109 MG/DL
MCH RBC QN AUTO: 30 PG (ref 27–31)
MCHC RBC AUTO-ENTMCNC: 31.4 G/DL (ref 33–37)
MCV RBC AUTO: 95.6 FL (ref 81–99)
PLATELET # BLD AUTO: 300 K/UL (ref 130–400)
PMV BLD AUTO: 10.4 FL (ref 9.4–12.3)
POTASSIUM SERPL-SCNC: 4.4 MMOL/L (ref 3.5–5)
PROT SERPL-MCNC: 7.6 G/DL (ref 6.4–8.3)
RBC # BLD AUTO: 3.9 M/UL (ref 4.2–5.4)
SODIUM SERPL-SCNC: 135 MMOL/L (ref 136–145)
TRIGL SERPL-MCNC: 96 MG/DL (ref 0–149)
TSH SERPL DL<=0.005 MIU/L-ACNC: 2.6 UIU/ML (ref 0.27–4.2)
WBC # BLD AUTO: 7.5 K/UL (ref 4.8–10.8)

## 2024-10-24 ENCOUNTER — OFFICE VISIT (OUTPATIENT)
Dept: ENT CLINIC | Age: 44
End: 2024-10-24
Payer: COMMERCIAL

## 2024-10-24 VITALS
HEIGHT: 60 IN | SYSTOLIC BLOOD PRESSURE: 116 MMHG | BODY MASS INDEX: 33.57 KG/M2 | DIASTOLIC BLOOD PRESSURE: 78 MMHG | WEIGHT: 171 LBS

## 2024-10-24 DIAGNOSIS — H65.91 RIGHT NON-SUPPURATIVE OTITIS MEDIA: Primary | ICD-10-CM

## 2024-10-24 PROCEDURE — 99213 OFFICE O/P EST LOW 20 MIN: CPT | Performed by: PHYSICIAN ASSISTANT

## 2024-10-24 RX ORDER — FLUCONAZOLE 150 MG/1
150 TABLET ORAL
Qty: 2 TABLET | Refills: 0 | Status: SHIPPED | OUTPATIENT
Start: 2024-10-24 | End: 2024-10-30

## 2024-10-24 RX ORDER — SULFAMETHOXAZOLE AND TRIMETHOPRIM 800; 160 MG/1; MG/1
1 TABLET ORAL 2 TIMES DAILY
Qty: 20 TABLET | Refills: 0 | Status: SHIPPED | OUTPATIENT
Start: 2024-10-24 | End: 2024-11-03

## 2024-10-24 RX ORDER — TOBRAMYCIN 3 MG/ML
3 SOLUTION/ DROPS OPHTHALMIC 3 TIMES DAILY
Qty: 5 ML | Refills: 0 | Status: SHIPPED | OUTPATIENT
Start: 2024-10-24 | End: 2024-11-03

## 2024-10-24 NOTE — PROGRESS NOTES
Sue is a pleasant 44-year-old  female that presents for evaluation of the right ear due to newly developed drainage.  She reports that the drainage has been fairly clear with no odor.  She also reports of sporadic drainage from the left ear.  Of note, patient reports that she was able to drive for the first time in several months due to no seizure activity.      Physical examination with the microscope revealed the patient to have a 10 to 15% perforation centrally with clear drainage noted to be coming from the middle ear.  There is no evidence of otitis externa.  Examination of the left ear demonstrated no abnormalities with a normal-appearing TM canal.  Neck exam demonstrated no lymphadenopathy or thyromegaly.      Impression: Questionable early right otitis media with chronic TM perforation    Plan: Due to the patient's previous cultures demonstrating Staph aureus, I will place her on tobramycin drops and Bactrim DS for 10 days.  She currently has an appointment see Dr. Liu next week for evaluation for possible tympanoplasty due to her recurrent infections because of the right TM perforation.  Hopefully the infection will be resolved by that time.  Patient was reminded to call if she has any questions or concerns.      Electronically signed by GAPSER CORLEY PA-C on 10/24/24 at 2:40 PM CDT

## 2024-10-28 RX ORDER — TOBRAMYCIN 3 MG/ML
SOLUTION/ DROPS OPHTHALMIC
Qty: 5 ML | Refills: 0 | Status: SHIPPED | OUTPATIENT
Start: 2024-10-28

## 2024-10-29 ENCOUNTER — OFFICE VISIT (OUTPATIENT)
Dept: INTERNAL MEDICINE | Age: 44
End: 2024-10-29
Payer: COMMERCIAL

## 2024-10-29 VITALS
WEIGHT: 170 LBS | DIASTOLIC BLOOD PRESSURE: 72 MMHG | HEART RATE: 78 BPM | BODY MASS INDEX: 33.38 KG/M2 | HEIGHT: 60 IN | OXYGEN SATURATION: 100 % | SYSTOLIC BLOOD PRESSURE: 100 MMHG

## 2024-10-29 DIAGNOSIS — F34.1 DYSTHYMIA: ICD-10-CM

## 2024-10-29 DIAGNOSIS — E55.9 VITAMIN D DEFICIENCY: ICD-10-CM

## 2024-10-29 DIAGNOSIS — Z13.220 SCREENING, LIPID: ICD-10-CM

## 2024-10-29 DIAGNOSIS — H65.04 RECURRENT ACUTE SEROUS OTITIS MEDIA OF RIGHT EAR: ICD-10-CM

## 2024-10-29 DIAGNOSIS — G40.909 SEIZURE DISORDER (HCC): Primary | ICD-10-CM

## 2024-10-29 DIAGNOSIS — Z85.841 HISTORY OF BRAIN CANCER: ICD-10-CM

## 2024-10-29 DIAGNOSIS — Z13.1 SCREENING FOR DIABETES MELLITUS: ICD-10-CM

## 2024-10-29 PROCEDURE — 99214 OFFICE O/P EST MOD 30 MIN: CPT | Performed by: INTERNAL MEDICINE

## 2024-10-29 RX ORDER — CHOLECALCIFEROL (VITAMIN D3) 25 MCG
1000 TABLET ORAL DAILY
Qty: 90 TABLET | Refills: 3 | Status: SHIPPED | OUTPATIENT
Start: 2024-10-29

## 2024-10-29 NOTE — PROGRESS NOTES
ferrous sulfate 325 (65 Fe) MG tablet Take 1 tablet by mouth daily (with breakfast)      OXcarbazepine (TRILEPTAL) 300 MG tablet Take 1 tablet by mouth 2 in the am and 2q hs       No current facility-administered medications for this visit.       Review of Systems    /72   Pulse 78   Ht 1.524 m (5')   Wt 77.1 kg (170 lb)   SpO2 100%   BMI 33.20 kg/m²   BP Readings from Last 7 Encounters:   10/29/24 100/72   10/24/24 116/78   09/26/24 128/82   09/24/24 120/85   09/12/24 118/74   08/27/24 118/76   07/24/24 126/72     Wt Readings from Last 7 Encounters:   10/29/24 77.1 kg (170 lb)   10/24/24 77.6 kg (171 lb)   09/26/24 77.1 kg (170 lb)   09/24/24 77.1 kg (170 lb)   09/12/24 79.1 kg (174 lb 6.4 oz)   08/27/24 78 kg (172 lb)   07/24/24 78.9 kg (174 lb)     BMI Readings from Last 7 Encounters:   10/29/24 33.20 kg/m²   10/24/24 33.40 kg/m²   09/26/24 33.20 kg/m²   09/24/24 33.20 kg/m²   09/12/24 34.06 kg/m²   08/27/24 33.59 kg/m²   07/24/24 33.98 kg/m²     Resp Readings from Last 7 Encounters:   09/24/24 16   05/10/24 18   03/18/24 18   02/22/24 18   12/21/23 18   12/19/23 18   11/01/22 18       Physical Exam  Constitutional:       General: She is not in acute distress.  HENT:      Head:      Comments: Right TM small opening with some purulent slight material in canal.  Eyes:      General: No scleral icterus.  Cardiovascular:      Heart sounds: Normal heart sounds.   Pulmonary:      Breath sounds: Normal breath sounds.   Musculoskeletal:      Cervical back: Neck supple.   Lymphadenopathy:      Cervical: No cervical adenopathy.   Skin:     Findings: No rash.   Psychiatric:         Mood and Affect: Mood normal.         Results for orders placed or performed in visit on 10/21/24   Vitamin D 25 Hydroxy   Result Value Ref Range    Vit D, 25-Hydroxy 27.2 (L) >=30 ng/mL   Lipid Panel   Result Value Ref Range    Cholesterol, Total 178 0 - 199 mg/dL    Triglycerides 96 0 - 149 mg/dL    HDL 50 40 - 60 mg/dL    LDL

## 2024-10-30 NOTE — PROGRESS NOTES
Please call her and be sure she knows stop the bactrim-- find out if she developed a rash or if just the itching and if worse let meknow -- no abx needed right now until sees ENT tomorrow

## 2024-10-31 ENCOUNTER — PREP FOR PROCEDURE (OUTPATIENT)
Dept: ENT CLINIC | Age: 44
End: 2024-10-31

## 2024-10-31 ENCOUNTER — OFFICE VISIT (OUTPATIENT)
Dept: ENT CLINIC | Age: 44
End: 2024-10-31

## 2024-10-31 VITALS — DIASTOLIC BLOOD PRESSURE: 82 MMHG | SYSTOLIC BLOOD PRESSURE: 124 MMHG

## 2024-10-31 DIAGNOSIS — H72.91 PERFORATION OF RIGHT TYMPANIC MEMBRANE: ICD-10-CM

## 2024-10-31 DIAGNOSIS — H92.11 OTORRHEA OF RIGHT EAR: ICD-10-CM

## 2024-10-31 DIAGNOSIS — H69.91 DYSFUNCTION OF RIGHT EUSTACHIAN TUBE: Primary | ICD-10-CM

## 2024-10-31 DIAGNOSIS — H69.91 DYSFUNCTION OF RIGHT EUSTACHIAN TUBE: ICD-10-CM

## 2024-10-31 DIAGNOSIS — H92.11 OTORRHEA, RIGHT: ICD-10-CM

## 2024-10-31 DIAGNOSIS — H72.91 RUPTURE OF RIGHT TYMPANIC MEMBRANE: ICD-10-CM

## 2024-10-31 DIAGNOSIS — Z85.841 HISTORY OF BRAIN CANCER: ICD-10-CM

## 2024-10-31 RX ORDER — CIPROFLOXACIN AND DEXAMETHASONE 3; 1 MG/ML; MG/ML
4 SUSPENSION/ DROPS AURICULAR (OTIC) 2 TIMES DAILY
Qty: 7.5 ML | Refills: 1 | Status: SHIPPED | OUTPATIENT
Start: 2024-10-31 | End: 2024-11-21

## 2024-10-31 ASSESSMENT — ENCOUNTER SYMPTOMS
RESPIRATORY NEGATIVE: 1
GASTROINTESTINAL NEGATIVE: 1
ALLERGIC/IMMUNOLOGIC NEGATIVE: 1
EYES NEGATIVE: 1

## 2024-10-31 NOTE — PROGRESS NOTES
Breath sounds: Normal breath sounds.   Musculoskeletal:      Cervical back: Normal range of motion.   Skin:     General: Skin is warm and dry.   Neurological:      General: No focal deficit present.      Mental Status: She is alert and oriented to person, place, and time.   Psychiatric:         Mood and Affect: Mood normal.         Behavior: Behavior normal.        Procedure Note:    Otomicroscopy     An operating microscope was used to examine the right ear.  A small perforation is noted with moderate inflammation of the TM with drainage.    ASSESSMENT/PLAN:    1. Dysfunction of right eustachian tube  2. History of brain cancer  3. Perforation of right tympanic membrane  4. Otorrhea, right  Ciprodex drops for the next 3 weeks we will plan on a trip to the OR for balloon dilation and possible myringoplasty if the hole is still present.  Risk and benefits discussed and she would like to proceed.    Return in about 12 weeks (around 1/23/2025).    An electronic signature was used to authenticate this note.    Jabari Liu MD       Please note that this chart was generated using dragon dictation software.  Although every effort was made to ensure the accuracy of this automated transcription, some errors in transcription may have occurred.

## 2024-11-21 ENCOUNTER — OFFICE VISIT (OUTPATIENT)
Dept: ENT CLINIC | Age: 44
End: 2024-11-21
Payer: COMMERCIAL

## 2024-11-21 VITALS
SYSTOLIC BLOOD PRESSURE: 110 MMHG | DIASTOLIC BLOOD PRESSURE: 72 MMHG | HEIGHT: 60 IN | WEIGHT: 172 LBS | BODY MASS INDEX: 33.77 KG/M2

## 2024-11-21 DIAGNOSIS — H72.91 RUPTURE OF RIGHT TYMPANIC MEMBRANE: Primary | ICD-10-CM

## 2024-11-21 PROCEDURE — 99212 OFFICE O/P EST SF 10 MIN: CPT | Performed by: PHYSICIAN ASSISTANT

## 2024-11-21 NOTE — PROGRESS NOTES
Sue is a pleasant 44-year-old  female that presents with complaints of questionable drainage from the right ear.  She is currently scheduled for a tympanoplasty with Dr. Liu and was fearful that she may be developing an infection.  Overall denies any pain from the right ear.      Physical examination with the microscope revealed the patient to have a 30% perforation centrally that appears to be dry with no evidence of infection.  A small amount of wax was noted to the floor of the canal that was nonobstructive.  The left ear demonstrated normal TM and canal.  Neck exam demonstrated no lymphadenopathy or thyromegaly.  Oral exam is unrevealing.      Impression: Right TM perforation with no evidence of infection    Plan: I advised the patient to continue monitoring this until her surgery date.  She was reminded to call if she has any questions or concerns.      Electronically signed by GASPER CORLEY PA-C on 11/21/24 at 1:05 PM CST

## 2024-12-19 DIAGNOSIS — G89.18 POSTOPERATIVE PAIN: Primary | ICD-10-CM

## 2024-12-19 RX ORDER — OXYCODONE AND ACETAMINOPHEN 5; 325 MG/1; MG/1
1 TABLET ORAL EVERY 6 HOURS PRN
Qty: 12 TABLET | Refills: 0 | Status: SHIPPED | OUTPATIENT
Start: 2024-12-19 | End: 2024-12-22

## 2024-12-19 ASSESSMENT — ENCOUNTER SYMPTOMS
GASTROINTESTINAL NEGATIVE: 1
RESPIRATORY NEGATIVE: 1
ALLERGIC/IMMUNOLOGIC NEGATIVE: 1
EYES NEGATIVE: 1

## 2024-12-19 NOTE — H&P
12/10/2024    Sue Faria (:  1980) is a 44 y.o. female, Established patient, here for evaluation of the following chief complaint(s):  No chief complaint on file.      There were no vitals filed for this visit.      Wt Readings from Last 3 Encounters:   12/10/24 79.8 kg (176 lb)   24 78 kg (172 lb)   10/29/24 77.1 kg (170 lb)       BP Readings from Last 3 Encounters:   24 110/72   10/31/24 124/82   10/29/24 100/72         SUBJECTIVE/OBJECTIVE:    Patient seen today for her right ear.  She suffers from frequent perforations on the right.  She currently has drainage and going on for several weeks.  Medications have not helped.  She has a history of brain cancer on the right treated with surgery chemotherapy and radiation just to the right side.  She has had issues with that ear since then.        Review of Systems   Constitutional: Negative.    HENT:  Positive for ear discharge and hearing loss.    Eyes: Negative.    Respiratory: Negative.     Cardiovascular: Negative.    Gastrointestinal: Negative.    Endocrine: Negative.    Musculoskeletal: Negative.    Skin: Negative.    Allergic/Immunologic: Negative.    Neurological: Negative.    Hematological: Negative.    Psychiatric/Behavioral: Negative.          Physical Exam  Vitals reviewed.   Constitutional:       Appearance: Normal appearance. She is normal weight.   HENT:      Head: Normocephalic and atraumatic.      Right Ear: Ear canal and external ear normal. Drainage present. Tympanic membrane is perforated.      Left Ear: Tympanic membrane, ear canal and external ear normal.      Nose: Nose normal.      Mouth/Throat:      Mouth: Mucous membranes are moist.      Pharynx: Oropharynx is clear.   Eyes:      Extraocular Movements: Extraocular movements intact.      Pupils: Pupils are equal, round, and reactive to light.   Cardiovascular:      Rate and Rhythm: Normal rate and regular rhythm.   Pulmonary:      Effort: Pulmonary effort is

## 2024-12-20 ENCOUNTER — ANESTHESIA (OUTPATIENT)
Dept: OPERATING ROOM | Age: 44
End: 2024-12-20
Payer: COMMERCIAL

## 2024-12-20 ENCOUNTER — HOSPITAL ENCOUNTER (OUTPATIENT)
Age: 44
Setting detail: OUTPATIENT SURGERY
Discharge: HOME | End: 2024-12-20
Attending: OTOLARYNGOLOGY | Admitting: OTOLARYNGOLOGY
Payer: COMMERCIAL

## 2024-12-20 ENCOUNTER — ANESTHESIA EVENT (OUTPATIENT)
Dept: OPERATING ROOM | Age: 44
End: 2024-12-20
Payer: COMMERCIAL

## 2024-12-20 VITALS
BODY MASS INDEX: 34.55 KG/M2 | HEART RATE: 66 BPM | DIASTOLIC BLOOD PRESSURE: 96 MMHG | TEMPERATURE: 97.7 F | RESPIRATION RATE: 16 BRPM | SYSTOLIC BLOOD PRESSURE: 129 MMHG | WEIGHT: 176 LBS | HEIGHT: 60 IN | OXYGEN SATURATION: 98 %

## 2024-12-20 LAB — HCG SERPL QL: NEGATIVE

## 2024-12-20 PROCEDURE — 2500000003 HC RX 250 WO HCPCS: Performed by: ANESTHESIOLOGY

## 2024-12-20 PROCEDURE — 3700000001 HC ADD 15 MINUTES (ANESTHESIA): Performed by: OTOLARYNGOLOGY

## 2024-12-20 PROCEDURE — 6370000000 HC RX 637 (ALT 250 FOR IP): Performed by: OTOLARYNGOLOGY

## 2024-12-20 PROCEDURE — 36415 COLL VENOUS BLD VENIPUNCTURE: CPT

## 2024-12-20 PROCEDURE — 7100000011 HC PHASE II RECOVERY - ADDTL 15 MIN: Performed by: OTOLARYNGOLOGY

## 2024-12-20 PROCEDURE — 6360000002 HC RX W HCPCS: Performed by: NURSE ANESTHETIST, CERTIFIED REGISTERED

## 2024-12-20 PROCEDURE — 2580000003 HC RX 258: Performed by: ANESTHESIOLOGY

## 2024-12-20 PROCEDURE — 7100000010 HC PHASE II RECOVERY - FIRST 15 MIN: Performed by: OTOLARYNGOLOGY

## 2024-12-20 PROCEDURE — 84703 CHORIONIC GONADOTROPIN ASSAY: CPT

## 2024-12-20 PROCEDURE — 7100000001 HC PACU RECOVERY - ADDTL 15 MIN: Performed by: OTOLARYNGOLOGY

## 2024-12-20 PROCEDURE — 3600000012 HC SURGERY LEVEL 2 ADDTL 15MIN: Performed by: OTOLARYNGOLOGY

## 2024-12-20 PROCEDURE — 3700000000 HC ANESTHESIA ATTENDED CARE: Performed by: OTOLARYNGOLOGY

## 2024-12-20 PROCEDURE — 3600000002 HC SURGERY LEVEL 2 BASE: Performed by: OTOLARYNGOLOGY

## 2024-12-20 PROCEDURE — C1726 CATH, BAL DIL, NON-VASCULAR: HCPCS | Performed by: OTOLARYNGOLOGY

## 2024-12-20 PROCEDURE — 2709999900 HC NON-CHARGEABLE SUPPLY: Performed by: OTOLARYNGOLOGY

## 2024-12-20 PROCEDURE — 69620 MYRINGOPLASTY: CPT | Performed by: OTOLARYNGOLOGY

## 2024-12-20 PROCEDURE — 69705 NPS SURG DILAT EUST TUBE UNI: CPT | Performed by: OTOLARYNGOLOGY

## 2024-12-20 PROCEDURE — 7100000000 HC PACU RECOVERY - FIRST 15 MIN: Performed by: OTOLARYNGOLOGY

## 2024-12-20 RX ORDER — SODIUM CHLORIDE 0.9 % (FLUSH) 0.9 %
5-40 SYRINGE (ML) INJECTION EVERY 12 HOURS SCHEDULED
Status: DISCONTINUED | OUTPATIENT
Start: 2024-12-20 | End: 2024-12-20 | Stop reason: HOSPADM

## 2024-12-20 RX ORDER — NALOXONE HYDROCHLORIDE 0.4 MG/ML
INJECTION, SOLUTION INTRAMUSCULAR; INTRAVENOUS; SUBCUTANEOUS PRN
Status: DISCONTINUED | OUTPATIENT
Start: 2024-12-20 | End: 2024-12-20 | Stop reason: HOSPADM

## 2024-12-20 RX ORDER — LIDOCAINE HYDROCHLORIDE 10 MG/ML
INJECTION, SOLUTION INFILTRATION; PERINEURAL
Status: DISCONTINUED | OUTPATIENT
Start: 2024-12-20 | End: 2024-12-20 | Stop reason: SDUPTHER

## 2024-12-20 RX ORDER — SODIUM CHLORIDE 9 MG/ML
INJECTION, SOLUTION INTRAVENOUS PRN
Status: DISCONTINUED | OUTPATIENT
Start: 2024-12-20 | End: 2024-12-20 | Stop reason: HOSPADM

## 2024-12-20 RX ORDER — LIDOCAINE HYDROCHLORIDE 10 MG/ML
1 INJECTION, SOLUTION EPIDURAL; INFILTRATION; INTRACAUDAL; PERINEURAL
Status: DISCONTINUED | OUTPATIENT
Start: 2024-12-20 | End: 2024-12-20 | Stop reason: HOSPADM

## 2024-12-20 RX ORDER — ONDANSETRON 2 MG/ML
4 INJECTION INTRAMUSCULAR; INTRAVENOUS
Status: DISCONTINUED | OUTPATIENT
Start: 2024-12-20 | End: 2024-12-20 | Stop reason: HOSPADM

## 2024-12-20 RX ORDER — OXYMETAZOLINE HYDROCHLORIDE 0.05 G/100ML
SPRAY NASAL PRN
Status: DISCONTINUED | OUTPATIENT
Start: 2024-12-20 | End: 2024-12-20 | Stop reason: HOSPADM

## 2024-12-20 RX ORDER — DEXAMETHASONE SODIUM PHOSPHATE 10 MG/ML
INJECTION, SOLUTION INTRAMUSCULAR; INTRAVENOUS
Status: DISCONTINUED | OUTPATIENT
Start: 2024-12-20 | End: 2024-12-20 | Stop reason: SDUPTHER

## 2024-12-20 RX ORDER — DEXAMETHASONE SODIUM PHOSPHATE 4 MG/ML
4 INJECTION, SOLUTION INTRA-ARTICULAR; INTRALESIONAL; INTRAMUSCULAR; INTRAVENOUS; SOFT TISSUE ONCE
Status: DISCONTINUED | OUTPATIENT
Start: 2024-12-20 | End: 2024-12-20 | Stop reason: HOSPADM

## 2024-12-20 RX ORDER — FENTANYL CITRATE 50 UG/ML
50 INJECTION, SOLUTION INTRAMUSCULAR; INTRAVENOUS EVERY 5 MIN PRN
Status: DISCONTINUED | OUTPATIENT
Start: 2024-12-20 | End: 2024-12-20 | Stop reason: HOSPADM

## 2024-12-20 RX ORDER — FENTANYL CITRATE 50 UG/ML
25 INJECTION, SOLUTION INTRAMUSCULAR; INTRAVENOUS EVERY 5 MIN PRN
Status: DISCONTINUED | OUTPATIENT
Start: 2024-12-20 | End: 2024-12-20 | Stop reason: HOSPADM

## 2024-12-20 RX ORDER — PROPOFOL 10 MG/ML
INJECTION, EMULSION INTRAVENOUS
Status: DISCONTINUED | OUTPATIENT
Start: 2024-12-20 | End: 2024-12-20 | Stop reason: SDUPTHER

## 2024-12-20 RX ORDER — SODIUM CHLORIDE 0.9 % (FLUSH) 0.9 %
5-40 SYRINGE (ML) INJECTION PRN
Status: DISCONTINUED | OUTPATIENT
Start: 2024-12-20 | End: 2024-12-20 | Stop reason: HOSPADM

## 2024-12-20 RX ORDER — DIPHENHYDRAMINE HYDROCHLORIDE 50 MG/ML
12.5 INJECTION INTRAMUSCULAR; INTRAVENOUS
Status: DISCONTINUED | OUTPATIENT
Start: 2024-12-20 | End: 2024-12-20 | Stop reason: HOSPADM

## 2024-12-20 RX ORDER — CIPROFLOXACIN AND DEXAMETHASONE 3; 1 MG/ML; MG/ML
SUSPENSION/ DROPS AURICULAR (OTIC) PRN
Status: DISCONTINUED | OUTPATIENT
Start: 2024-12-20 | End: 2024-12-20 | Stop reason: HOSPADM

## 2024-12-20 RX ORDER — FENTANYL CITRATE 50 UG/ML
INJECTION, SOLUTION INTRAMUSCULAR; INTRAVENOUS
Status: DISCONTINUED | OUTPATIENT
Start: 2024-12-20 | End: 2024-12-20 | Stop reason: SDUPTHER

## 2024-12-20 RX ORDER — SODIUM CHLORIDE, SODIUM LACTATE, POTASSIUM CHLORIDE, CALCIUM CHLORIDE 600; 310; 30; 20 MG/100ML; MG/100ML; MG/100ML; MG/100ML
INJECTION, SOLUTION INTRAVENOUS CONTINUOUS
Status: DISCONTINUED | OUTPATIENT
Start: 2024-12-20 | End: 2024-12-20 | Stop reason: HOSPADM

## 2024-12-20 RX ORDER — ONDANSETRON 2 MG/ML
INJECTION INTRAMUSCULAR; INTRAVENOUS
Status: DISCONTINUED | OUTPATIENT
Start: 2024-12-20 | End: 2024-12-20 | Stop reason: SDUPTHER

## 2024-12-20 RX ORDER — PROCHLORPERAZINE EDISYLATE 5 MG/ML
5 INJECTION INTRAMUSCULAR; INTRAVENOUS
Status: DISCONTINUED | OUTPATIENT
Start: 2024-12-20 | End: 2024-12-20 | Stop reason: HOSPADM

## 2024-12-20 RX ORDER — MIDAZOLAM HYDROCHLORIDE 2 MG/2ML
2 INJECTION, SOLUTION INTRAMUSCULAR; INTRAVENOUS
Status: DISCONTINUED | OUTPATIENT
Start: 2024-12-20 | End: 2024-12-20 | Stop reason: HOSPADM

## 2024-12-20 RX ADMIN — PROPOFOL 200 MG: 10 INJECTION, EMULSION INTRAVENOUS at 12:34

## 2024-12-20 RX ADMIN — LIDOCAINE HYDROCHLORIDE 50 MG: 10 INJECTION, SOLUTION INFILTRATION; PERINEURAL at 12:34

## 2024-12-20 RX ADMIN — ONDANSETRON 4 MG: 2 INJECTION INTRAMUSCULAR; INTRAVENOUS at 12:39

## 2024-12-20 RX ADMIN — FAMOTIDINE 20 MG: 10 INJECTION, SOLUTION INTRAVENOUS at 10:55

## 2024-12-20 RX ADMIN — DEXAMETHASONE SODIUM PHOSPHATE 10 MG: 10 INJECTION, SOLUTION INTRAMUSCULAR; INTRAVENOUS at 12:39

## 2024-12-20 RX ADMIN — SODIUM CHLORIDE, POTASSIUM CHLORIDE, SODIUM LACTATE AND CALCIUM CHLORIDE: 600; 310; 30; 20 INJECTION, SOLUTION INTRAVENOUS at 10:45

## 2024-12-20 RX ADMIN — FENTANYL CITRATE 25 MCG: 0.05 INJECTION, SOLUTION INTRAMUSCULAR; INTRAVENOUS at 12:37

## 2024-12-20 ASSESSMENT — PAIN - FUNCTIONAL ASSESSMENT
PAIN_FUNCTIONAL_ASSESSMENT: NONE - DENIES PAIN
PAIN_FUNCTIONAL_ASSESSMENT: NONE - DENIES PAIN

## 2024-12-20 NOTE — OP NOTE
Operative Note      Patient: Sue Faria  YOB: 1980  MRN: [id number]    Date of Procedure: 12/20/2024    Pre-Op Diagnosis Codes:      * Dysfunction of right eustachian tube [H69.91]     * Rupture of right tympanic membrane [H72.91]     * Otorrhea of right ear [H92.11]    Post-Op Diagnosis: Same       Procedure(s):  Balloon dilation  Myringoplasty    Surgeon(s):  Jabari Liu MD    Assistant:   * No surgical staff found *    Anesthesia: Choice    Estimated Blood Loss (mL): Minimal    Complications: None    Specimens:   * No specimens in log *    Implants:  * No implants in log *      Drains: * No LDAs found *    Findings:  Infection Present At Time Of Surgery (PATOS) (choose all levels that have infection present):  No infection present  Other Findings: Perforation right TM clear nasopharynx    Detailed Description of Procedure:   After obtaining informed consent, the patient was taken the operating room and placed op table supine position.  After induction of general LMA anesthesia the patient was prepped in the standard fashion for myringoplasties along with balloon dilation on the right.  Once a timeout was performed and Afrin-soaked pledget was placed in the right nare.  Next the microscope used to examine the right ear.  Perforation was noted slightly posterior and inferior.  The edges were freshened with a Londono pick and a paper patch was applied.  Next attention was directed to the right nasal cavity.  The 30 degree endoscope used to identify the open eustachian tube and the balloon was inserted inflated for 2 minutes.  Once complete the patient was returned to anesthesia having suffered no complications.    Electronically signed by Jabari Liu MD on 12/20/2024 at 12:56 PM

## 2024-12-20 NOTE — DISCHARGE INSTRUCTIONS
Please call Dr. Liu with questions or concerns.  Pain meds as needed and follow-up in about a month.

## 2024-12-20 NOTE — ANESTHESIA POSTPROCEDURE EVALUATION
Department of Anesthesiology  Postprocedure Note    Patient: Sue Faria  MRN: [id number]  YOB: 1980  Date of evaluation: 12/20/2024    Procedure Summary       Date: 12/20/24 Room / Location: [address]    Anesthesia Start: 1230 Anesthesia Stop: 1252    Procedures:       Balloon dilation (Right: Ear)      Myringoplasty (Right) Diagnosis:       Dysfunction of right eustachian tube      Rupture of right tympanic membrane      Otorrhea of right ear      (Dysfunction of right eustachian tube [H69.91])      (Rupture of right tympanic membrane [H72.91])      (Otorrhea of right ear [H92.11])    Surgeons: Jabari Liu MD Responsible Provider: Debbi Fernandes APRN - CRNA    Anesthesia Type: general ASA Status: 2            Anesthesia Type: No value filed.    Charito Phase I:      Charito Phase II:      Anesthesia Post Evaluation    Patient location during evaluation: PACU  Patient participation: waiting for patient participation  Level of consciousness: obtunded/minimal responses  Pain score: 0  Airway patency: patent  Nausea & Vomiting: no nausea and no vomiting  Cardiovascular status: hemodynamically stable  Respiratory status: acceptable  Hydration status: stable  Comments: Transported to PACU with LMA  Pain management: adequate    No notable events documented.

## 2024-12-20 NOTE — ANESTHESIA PRE PROCEDURE
Department of Anesthesiology  Preprocedure Note       Name:  Sue Faria   Age:  44 y.o.  :  1980                                          MRN:  [id number]         Date:  2024      Surgeon: Surgeon(s):  Jabari Liu MD    Procedure: Procedure(s):  Balloon dilation  Myringoplasty    Medications prior to admission:   Prior to Admission medications    Medication Sig Start Date End Date Taking? Authorizing Provider   oxyCODONE-acetaminophen (ENDOCET) 5-325 MG per tablet Take 1 tablet by mouth every 6 hours as needed for Pain for up to 3 days. Intended supply: 3 days. Take lowest dose possible to manage pain Max Daily Amount: 4 tablets 24  Jabari Liu MD   vitamin D3 (CHOLECALCIFEROL) 25 MCG (1000 UT) TABS tablet Take 1 tablet by mouth daily 10/29/24   Khalida Britt MD   tobramycin (TOBREX) 0.3 % ophthalmic solution PLACE 3 DROPS IN RIGHT EAR 3 TIMES DAILY FOR 10 DAYS  Patient not taking: Reported on 12/10/2024 10/28/24   Daniel Witt PA-C   topiramate (TOPAMAX) 100 MG tablet Take 1 tablet by mouth 2 times daily 3/18/24   Caleb Villasenor MD   buPROPion (WELLBUTRIN XL) 150 MG extended release tablet TAKE 1 TABLET BY MOUTH EVERY DAY IN THE MORNING 21  Khalida Britt MD   aspirin 81 MG tablet Take 1 tablet by mouth daily    ProviderCatherine MD   ferrous sulfate 325 (65 Fe) MG tablet Take 1 tablet by mouth daily (with breakfast)    ProviderCatherine MD   OXcarbazepine (TRILEPTAL) 300 MG tablet Take 1 tablet by mouth 2 times daily 2 in the am and 2q hs    ProviderCatherine MD       Current medications:    Current Facility-Administered Medications   Medication Dose Route Frequency Provider Last Rate Last Admin    lactated ringers infusion   IntraVENous Continuous Jose Luz DO           Allergies:    Allergies   Allergen Reactions    Bactrim [Sulfamethoxazole-Trimethoprim]      Itching        Problem List:    Patient Active Problem List

## 2024-12-20 NOTE — BRIEF OP NOTE
Brief Postoperative Note      Patient: Sue Faria  YOB: 1980  MRN: [id number]    Date of Procedure: 12/20/2024    Pre-Op Diagnosis Codes:      * Dysfunction of right eustachian tube [H69.91]     * Rupture of right tympanic membrane [H72.91]     * Otorrhea of right ear [H92.11]    Post-Op Diagnosis: Same       Procedure(s):  Balloon dilation  Myringoplasty    Surgeon(s):  Jabari Liu MD    Assistant:  * No surgical staff found *    Anesthesia: Choice    Estimated Blood Loss (mL): Minimal    Complications: None    Specimens:   * No specimens in log *    Implants:  * No implants in log *      Drains: * No LDAs found *    Findings:  Infection Present At Time Of Surgery (PATOS) (choose all levels that have infection present):  No infection present  Other Findings: perforation right tm, clear NP    Electronically signed by Jabair Liu MD on 12/20/2024 at 12:48 PM

## 2024-12-20 NOTE — INTERVAL H&P NOTE
Update History & Physical    The patient's History and Physical of December 10, 2024 was reviewed with the patient and I examined the patient. There was no change. The surgical site was confirmed by the patient and me.     Plan: The risks, benefits, expected outcome, and alternative to the recommended procedure have been discussed with the patient. Patient understands and wants to proceed with the procedure.     Electronically signed by Jabari Liu MD on 12/20/2024 at 12:21 PM

## 2025-01-20 ENCOUNTER — OFFICE VISIT (OUTPATIENT)
Dept: ENT CLINIC | Age: 45
End: 2025-01-20
Payer: COMMERCIAL

## 2025-01-20 VITALS
WEIGHT: 171 LBS | HEIGHT: 60 IN | SYSTOLIC BLOOD PRESSURE: 132 MMHG | BODY MASS INDEX: 33.57 KG/M2 | DIASTOLIC BLOOD PRESSURE: 78 MMHG

## 2025-01-20 DIAGNOSIS — H69.91 DYSFUNCTION OF RIGHT EUSTACHIAN TUBE: Primary | ICD-10-CM

## 2025-01-20 DIAGNOSIS — H92.11 OTORRHEA, RIGHT: ICD-10-CM

## 2025-01-20 PROCEDURE — 99213 OFFICE O/P EST LOW 20 MIN: CPT | Performed by: OTOLARYNGOLOGY

## 2025-01-20 RX ORDER — CIPROFLOXACIN AND DEXAMETHASONE 3; 1 MG/ML; MG/ML
4 SUSPENSION/ DROPS AURICULAR (OTIC) 2 TIMES DAILY
Qty: 7.5 ML | Refills: 0 | Status: SHIPPED | OUTPATIENT
Start: 2025-01-20 | End: 2025-02-03

## 2025-01-20 ASSESSMENT — ENCOUNTER SYMPTOMS
ALLERGIC/IMMUNOLOGIC NEGATIVE: 1
GASTROINTESTINAL NEGATIVE: 1
EYES NEGATIVE: 1
RESPIRATORY NEGATIVE: 1

## 2025-01-20 NOTE — PROGRESS NOTES
of motion.   Skin:     General: Skin is warm and dry.   Neurological:      General: No focal deficit present.      Mental Status: She is alert and oriented to person, place, and time.   Psychiatric:         Mood and Affect: Mood normal.         Behavior: Behavior normal.              ASSESSMENT/PLAN:    1. Dysfunction of right eustachian tube  2. Otorrhea, right  Significant drainage on the right.  I will start her Ciprodex drops and see her back in 3 weeks.  Hopefully call this down to find out the status of the TM.  She said she had no pain after the surgery.    Return in about 3 weeks (around 2/10/2025).    An electronic signature was used to authenticate this note.    Jabari Liu MD       Please note that this chart was generated using dragon dictation software.  Although every effort was made to ensure the accuracy of this automated transcription, some errors in transcription may have occurred.

## 2025-01-28 RX ORDER — CIPROFLOXACIN AND DEXAMETHASONE 3; 1 MG/ML; MG/ML
4 SUSPENSION/ DROPS AURICULAR (OTIC) 2 TIMES DAILY
Qty: 7.5 ML | Refills: 0 | Status: SHIPPED | OUTPATIENT
Start: 2025-01-28 | End: 2025-02-11

## 2025-02-10 ENCOUNTER — OFFICE VISIT (OUTPATIENT)
Dept: ENT CLINIC | Age: 45
End: 2025-02-10
Payer: COMMERCIAL

## 2025-02-10 VITALS
SYSTOLIC BLOOD PRESSURE: 130 MMHG | WEIGHT: 168 LBS | HEIGHT: 60 IN | BODY MASS INDEX: 32.98 KG/M2 | DIASTOLIC BLOOD PRESSURE: 78 MMHG

## 2025-02-10 DIAGNOSIS — H92.11 OTORRHEA, RIGHT: ICD-10-CM

## 2025-02-10 DIAGNOSIS — H69.91 DYSFUNCTION OF RIGHT EUSTACHIAN TUBE: Primary | ICD-10-CM

## 2025-02-10 DIAGNOSIS — Z85.841 HISTORY OF BRAIN CANCER: ICD-10-CM

## 2025-02-10 PROCEDURE — 92504 EAR MICROSCOPY EXAMINATION: CPT | Performed by: OTOLARYNGOLOGY

## 2025-02-10 PROCEDURE — 99213 OFFICE O/P EST LOW 20 MIN: CPT | Performed by: OTOLARYNGOLOGY

## 2025-02-10 ASSESSMENT — ENCOUNTER SYMPTOMS
ALLERGIC/IMMUNOLOGIC NEGATIVE: 1
EYES NEGATIVE: 1
RESPIRATORY NEGATIVE: 1
GASTROINTESTINAL NEGATIVE: 1

## 2025-02-10 NOTE — PROGRESS NOTES
Pulmonary effort is normal.      Breath sounds: Normal breath sounds.   Musculoskeletal:      Cervical back: Normal range of motion.   Skin:     General: Skin is warm and dry.   Neurological:      General: No focal deficit present.      Mental Status: She is alert and oriented to person, place, and time.   Psychiatric:         Mood and Affect: Mood normal.         Behavior: Behavior normal.        Procedure Note:    Otomicroscopy     An operating microscope was utilized to examine the right ear.  Hard debris noted on the TM.  Cannot suction off.  Mild drainage    ASSESSMENT/PLAN:    1. Dysfunction of right eustachian tube  2. Otorrhea, right  3. History of brain cancer  Drops in the next 10 days and see her back in a month make sure that is clearing up.  I do want to pull off the debris as it might pull off the patch.    Return in about 4 weeks (around 3/10/2025).    An electronic signature was used to authenticate this note.    Jabari Liu MD       Please note that this chart was generated using dragon dictation software.  Although every effort was made to ensure the accuracy of this automated transcription, some errors in transcription may have occurred.

## 2025-02-12 DIAGNOSIS — G40.919 INTRACTABLE SEIZURES (HCC): Primary | ICD-10-CM

## 2025-02-12 RX ORDER — TOPIRAMATE 100 MG/1
100 TABLET, FILM COATED ORAL 2 TIMES DAILY
Qty: 180 TABLET | Refills: 3 | Status: SHIPPED | OUTPATIENT
Start: 2025-02-12

## 2025-02-12 NOTE — TELEPHONE ENCOUNTER
Requested Prescriptions     Pending Prescriptions Disp Refills    topiramate (TOPAMAX) 100 MG tablet [Pharmacy Med Name: TOPIRAMATE 100 MG TABLET] 180 tablet 3     Sig: TAKE 1 TABLET BY MOUTH TWICE A DAY       Last Office Visit: 9/24/2024  Next Office Visit: 3/27/2025  Last Medication Refill: 3/18/24 rf 3

## 2025-03-10 ENCOUNTER — OFFICE VISIT (OUTPATIENT)
Dept: ENT CLINIC | Age: 45
End: 2025-03-10
Payer: COMMERCIAL

## 2025-03-10 VITALS
HEIGHT: 60 IN | DIASTOLIC BLOOD PRESSURE: 80 MMHG | SYSTOLIC BLOOD PRESSURE: 124 MMHG | BODY MASS INDEX: 33.38 KG/M2 | WEIGHT: 170 LBS

## 2025-03-10 DIAGNOSIS — H72.91 TYMPANIC MEMBRANE PERFORATION, RIGHT: Primary | ICD-10-CM

## 2025-03-10 DIAGNOSIS — H90.71 MIXED CONDUCTIVE AND SENSORINEURAL HEARING LOSS OF RIGHT EAR WITH UNRESTRICTED HEARING OF LEFT EAR: ICD-10-CM

## 2025-03-10 PROCEDURE — 92504 EAR MICROSCOPY EXAMINATION: CPT | Performed by: OTOLARYNGOLOGY

## 2025-03-10 PROCEDURE — 99213 OFFICE O/P EST LOW 20 MIN: CPT | Performed by: OTOLARYNGOLOGY

## 2025-03-10 RX ORDER — CIPROFLOXACIN AND DEXAMETHASONE 3; 1 MG/ML; MG/ML
4 SUSPENSION/ DROPS AURICULAR (OTIC) NIGHTLY
Qty: 7.5 ML | Refills: 0 | Status: SHIPPED | OUTPATIENT
Start: 2025-03-10 | End: 2025-03-31

## 2025-03-10 ASSESSMENT — ENCOUNTER SYMPTOMS
EYES NEGATIVE: 1
GASTROINTESTINAL NEGATIVE: 1
ALLERGIC/IMMUNOLOGIC NEGATIVE: 1
RESPIRATORY NEGATIVE: 1

## 2025-03-10 NOTE — PROGRESS NOTES
3/10/2025    Sue Faria (:  1980) is a 44 y.o. female, Established patient, here for evaluation of the following chief complaint(s):  Follow-up (Right ear)      Vitals:    03/10/25 0911   BP: 124/80   Weight: 77.1 kg (170 lb)   Height: 1.524 m (5')       Wt Readings from Last 3 Encounters:   03/10/25 77.1 kg (170 lb)   02/10/25 76.2 kg (168 lb)   25 77.6 kg (171 lb)       BP Readings from Last 3 Encounters:   03/10/25 124/80   02/10/25 130/78   25 132/78         SUBJECTIVE/OBJECTIVE:    Patient seen today for her right ear.  I performed balloon dilation and put a patch on the ear but she has had drainage since last time I saw her.  She the drainage stopped this past week but she has but the hearing aid and concerned that it is causing the drainage.        Review of Systems   Constitutional: Negative.    HENT:  Positive for hearing loss.    Eyes: Negative.    Respiratory: Negative.     Cardiovascular: Negative.    Gastrointestinal: Negative.    Endocrine: Negative.    Musculoskeletal: Negative.    Skin: Negative.    Allergic/Immunologic: Negative.    Neurological: Negative.    Hematological: Negative.    Psychiatric/Behavioral: Negative.          Physical Exam  Vitals reviewed.   Constitutional:       Appearance: Normal appearance. She is normal weight.   HENT:      Head: Normocephalic and atraumatic.      Right Ear: Ear canal and external ear normal. Tympanic membrane is perforated.      Left Ear: Tympanic membrane, ear canal and external ear normal.      Ears:      Comments: Dry debris around the perforation     Nose: Nose normal.      Mouth/Throat:      Mouth: Mucous membranes are moist.      Pharynx: Oropharynx is clear.   Eyes:      Extraocular Movements: Extraocular movements intact.      Pupils: Pupils are equal, round, and reactive to light.   Cardiovascular:      Rate and Rhythm: Normal rate and regular rhythm.   Pulmonary:      Effort: Pulmonary effort is normal.      Breath

## 2025-03-27 ENCOUNTER — OFFICE VISIT (OUTPATIENT)
Dept: NEUROLOGY | Age: 45
End: 2025-03-27
Payer: COMMERCIAL

## 2025-03-27 VITALS
HEIGHT: 60 IN | RESPIRATION RATE: 16 BRPM | HEART RATE: 76 BPM | DIASTOLIC BLOOD PRESSURE: 88 MMHG | SYSTOLIC BLOOD PRESSURE: 128 MMHG | WEIGHT: 176 LBS | BODY MASS INDEX: 34.55 KG/M2

## 2025-03-27 DIAGNOSIS — C71.0: ICD-10-CM

## 2025-03-27 DIAGNOSIS — G40.919 INTRACTABLE SEIZURES (HCC): Primary | ICD-10-CM

## 2025-03-27 PROCEDURE — 99213 OFFICE O/P EST LOW 20 MIN: CPT | Performed by: PSYCHIATRY & NEUROLOGY

## 2025-03-27 NOTE — PROGRESS NOTES
[facility]  [address], [address]  [place] [address]  Phone [phone]  Fax [phone]     [facility] Follow Up Encounter  3/27/25 9:49 AM CDT    Information:   Patient Name: Sue Faria  :   1980  Age:   45 y.o.  MRN:   [id number]  Account #:  [id number]  Today:  3/27/25    Provider: Caleb Villasenor M.D.     Chief Complaint:   Chief Complaint   Patient presents with    Follow-up    Seizures       Subjective:   Sue Faria is a 45 y.o. woman with a remote grade 3 astrocytoma resection and seizures who is following up.  She had a recent MRI that showed no tumor recurrence.  She is taking Topamax in addition to Trileptal and tolerates them.  She has had no seizures in over a year since starting the Topamax.        Objective:     Past Medical History:  Past Medical History:   Diagnosis Date    Anaplastic glioma of brain (HCC) 2010    Anxiety 2017    Basal cell carcinoma     Dysthymia 2017    Ear pain     Seizure disorder (HCC) 2017       Past Surgical History:   Procedure Laterality Date    BREAST BIOPSY Left 2006    benign    BREAST BIOPSY Left 2010    benign    COLONOSCOPY N/A 05/10/2024    Dr LILLINAA Rider-Normal, 10 yr recall    CRANIOTOMY  2010    Right Frontal Lobe    MYRINGOTOMY Right 2024    Myringoplasty performed by Jabari Liu MD at [facility] OR    SKIN CANCER EXCISION      TYMPANOSTOMY TUBE PLACEMENT Right 2024    Balloon dilation performed by Jabari Liu MD at [facility] OR       Recent Hospitalizations  None    Significant Injuries  None    Habits  Sue Faria reports that she has never smoked. She has never used smokeless tobacco. She reports that she does not drink alcohol and does not use drugs.    Family History   Problem Relation Age of Onset    Hypertension Mother     Cancer Father     Esophageal Cancer Father     Breast Cancer Maternal Aunt 27    Brain Cancer Paternal Aunt     Ovarian Cancer Other        Social

## 2025-04-08 ENCOUNTER — OFFICE VISIT (OUTPATIENT)
Dept: ENT CLINIC | Age: 45
End: 2025-04-08
Payer: COMMERCIAL

## 2025-04-08 VITALS
WEIGHT: 171 LBS | SYSTOLIC BLOOD PRESSURE: 120 MMHG | BODY MASS INDEX: 33.57 KG/M2 | HEIGHT: 60 IN | DIASTOLIC BLOOD PRESSURE: 70 MMHG

## 2025-04-08 DIAGNOSIS — H72.91 TYMPANIC MEMBRANE PERFORATION, RIGHT: Primary | ICD-10-CM

## 2025-04-08 DIAGNOSIS — H90.71 MIXED CONDUCTIVE AND SENSORINEURAL HEARING LOSS OF RIGHT EAR WITH UNRESTRICTED HEARING OF LEFT EAR: ICD-10-CM

## 2025-04-08 PROCEDURE — 99213 OFFICE O/P EST LOW 20 MIN: CPT | Performed by: OTOLARYNGOLOGY

## 2025-04-08 ASSESSMENT — ENCOUNTER SYMPTOMS
ALLERGIC/IMMUNOLOGIC NEGATIVE: 1
RESPIRATORY NEGATIVE: 1
GASTROINTESTINAL NEGATIVE: 1
EYES NEGATIVE: 1

## 2025-04-08 NOTE — PROGRESS NOTES
2025    Sue Faria (:  1980) is a 45 y.o. female, Established patient, here for evaluation of the following chief complaint(s):  Follow-up (Ears)      Vitals:    25 1006   BP: 120/70   Weight: 77.6 kg (171 lb)   Height: 1.524 m (5')       Wt Readings from Last 3 Encounters:   25 77.6 kg (171 lb)   25 79.8 kg (176 lb)   03/10/25 77.1 kg (170 lb)       BP Readings from Last 3 Encounters:   25 120/70   25 128/88   03/10/25 124/80         SUBJECTIVE/OBJECTIVE:    She is seen today for hear ears.  She says her left ear is doing better. Hearing aids are working well.          Review of Systems   Constitutional: Negative.    HENT:  Positive for hearing loss.    Eyes: Negative.    Respiratory: Negative.     Cardiovascular: Negative.    Gastrointestinal: Negative.    Endocrine: Negative.    Musculoskeletal: Negative.    Skin: Negative.    Allergic/Immunologic: Negative.    Neurological: Negative.    Hematological: Negative.    Psychiatric/Behavioral: Negative.          Physical Exam  Vitals reviewed.   Constitutional:       Appearance: Normal appearance. She is normal weight.   HENT:      Head: Normocephalic and atraumatic.      Right Ear: Ear canal and external ear normal. Tympanic membrane is perforated.      Left Ear: Tympanic membrane, ear canal and external ear normal.      Nose: Nose normal.      Mouth/Throat:      Mouth: Mucous membranes are moist.      Pharynx: Oropharynx is clear.   Eyes:      Extraocular Movements: Extraocular movements intact.      Pupils: Pupils are equal, round, and reactive to light.   Cardiovascular:      Rate and Rhythm: Normal rate and regular rhythm.   Pulmonary:      Effort: Pulmonary effort is normal.      Breath sounds: Normal breath sounds.   Musculoskeletal:      Cervical back: Normal range of motion.   Skin:     General: Skin is warm and dry.   Neurological:      General: No focal deficit present.      Mental Status: She is alert and

## 2025-04-22 DIAGNOSIS — Z13.220 SCREENING, LIPID: ICD-10-CM

## 2025-04-22 DIAGNOSIS — E55.9 VITAMIN D DEFICIENCY: ICD-10-CM

## 2025-04-22 DIAGNOSIS — Z13.1 SCREENING FOR DIABETES MELLITUS: ICD-10-CM

## 2025-04-22 DIAGNOSIS — G40.909 SEIZURE DISORDER (HCC): ICD-10-CM

## 2025-04-22 LAB
25(OH)D3 SERPL-MCNC: 52 NG/ML
ALBUMIN SERPL-MCNC: 4.2 G/DL (ref 3.5–5.2)
ALP SERPL-CCNC: 96 U/L (ref 35–104)
ALT SERPL-CCNC: 17 U/L (ref 10–35)
ANION GAP SERPL CALCULATED.3IONS-SCNC: 9 MMOL/L (ref 8–16)
AST SERPL-CCNC: 16 U/L (ref 10–35)
BILIRUB SERPL-MCNC: <0.2 MG/DL (ref 0.2–1.2)
BUN SERPL-MCNC: 8 MG/DL (ref 6–20)
CALCIUM SERPL-MCNC: 9.7 MG/DL (ref 8.6–10)
CHLORIDE SERPL-SCNC: 108 MMOL/L (ref 98–107)
CHOLEST SERPL-MCNC: 159 MG/DL (ref 0–199)
CO2 SERPL-SCNC: 22 MMOL/L (ref 22–29)
CREAT SERPL-MCNC: 0.7 MG/DL (ref 0.5–0.9)
ERYTHROCYTE [DISTWIDTH] IN BLOOD BY AUTOMATED COUNT: 13.2 % (ref 11.5–14.5)
GLUCOSE SERPL-MCNC: 87 MG/DL (ref 70–99)
HBA1C MFR BLD: 5.4 % (ref 4–5.6)
HCT VFR BLD AUTO: 36.5 % (ref 37–47)
HDLC SERPL-MCNC: 49 MG/DL (ref 40–60)
HGB BLD-MCNC: 11.6 G/DL (ref 12–16)
LDLC SERPL CALC-MCNC: 98 MG/DL
MCH RBC QN AUTO: 29.6 PG (ref 27–31)
MCHC RBC AUTO-ENTMCNC: 31.8 G/DL (ref 33–37)
MCV RBC AUTO: 93.1 FL (ref 81–99)
PLATELET # BLD AUTO: 337 K/UL (ref 130–400)
PMV BLD AUTO: 10.4 FL (ref 9.4–12.3)
POTASSIUM SERPL-SCNC: 4.4 MMOL/L (ref 3.5–5.1)
PROT SERPL-MCNC: 7.3 G/DL (ref 6.4–8.3)
RBC # BLD AUTO: 3.92 M/UL (ref 4.2–5.4)
SODIUM SERPL-SCNC: 139 MMOL/L (ref 136–145)
TRIGL SERPL-MCNC: 59 MG/DL (ref 0–149)
WBC # BLD AUTO: 8.1 K/UL (ref 4.8–10.8)

## 2025-04-26 SDOH — ECONOMIC STABILITY: FOOD INSECURITY: WITHIN THE PAST 12 MONTHS, YOU WORRIED THAT YOUR FOOD WOULD RUN OUT BEFORE YOU GOT MONEY TO BUY MORE.: NEVER TRUE

## 2025-04-26 SDOH — ECONOMIC STABILITY: FOOD INSECURITY: WITHIN THE PAST 12 MONTHS, THE FOOD YOU BOUGHT JUST DIDN'T LAST AND YOU DIDN'T HAVE MONEY TO GET MORE.: NEVER TRUE

## 2025-04-26 SDOH — ECONOMIC STABILITY: INCOME INSECURITY: IN THE LAST 12 MONTHS, WAS THERE A TIME WHEN YOU WERE NOT ABLE TO PAY THE MORTGAGE OR RENT ON TIME?: NO

## 2025-04-26 SDOH — ECONOMIC STABILITY: TRANSPORTATION INSECURITY
IN THE PAST 12 MONTHS, HAS THE LACK OF TRANSPORTATION KEPT YOU FROM MEDICAL APPOINTMENTS OR FROM GETTING MEDICATIONS?: NO

## 2025-04-26 ASSESSMENT — PATIENT HEALTH QUESTIONNAIRE - PHQ9
5. POOR APPETITE OR OVEREATING: NOT AT ALL
2. FEELING DOWN, DEPRESSED OR HOPELESS: NOT AT ALL
9. THOUGHTS THAT YOU WOULD BE BETTER OFF DEAD, OR OF HURTING YOURSELF: NOT AT ALL
SUM OF ALL RESPONSES TO PHQ QUESTIONS 1-9: 0
SUM OF ALL RESPONSES TO PHQ QUESTIONS 1-9: 0
3. TROUBLE FALLING OR STAYING ASLEEP: NOT AT ALL
SUM OF ALL RESPONSES TO PHQ QUESTIONS 1-9: 0
3. TROUBLE FALLING OR STAYING ASLEEP: NOT AT ALL
7. TROUBLE CONCENTRATING ON THINGS, SUCH AS READING THE NEWSPAPER OR WATCHING TELEVISION: NOT AT ALL
10. IF YOU CHECKED OFF ANY PROBLEMS, HOW DIFFICULT HAVE THESE PROBLEMS MADE IT FOR YOU TO DO YOUR WORK, TAKE CARE OF THINGS AT HOME, OR GET ALONG WITH OTHER PEOPLE: NOT DIFFICULT AT ALL
4. FEELING TIRED OR HAVING LITTLE ENERGY: NOT AT ALL
6. FEELING BAD ABOUT YOURSELF - OR THAT YOU ARE A FAILURE OR HAVE LET YOURSELF OR YOUR FAMILY DOWN: NOT AT ALL
10. IF YOU CHECKED OFF ANY PROBLEMS, HOW DIFFICULT HAVE THESE PROBLEMS MADE IT FOR YOU TO DO YOUR WORK, TAKE CARE OF THINGS AT HOME, OR GET ALONG WITH OTHER PEOPLE: NOT DIFFICULT AT ALL
8. MOVING OR SPEAKING SO SLOWLY THAT OTHER PEOPLE COULD HAVE NOTICED. OR THE OPPOSITE - BEING SO FIDGETY OR RESTLESS THAT YOU HAVE BEEN MOVING AROUND A LOT MORE THAN USUAL: NOT AT ALL
2. FEELING DOWN, DEPRESSED OR HOPELESS: NOT AT ALL
7. TROUBLE CONCENTRATING ON THINGS, SUCH AS READING THE NEWSPAPER OR WATCHING TELEVISION: NOT AT ALL
SUM OF ALL RESPONSES TO PHQ QUESTIONS 1-9: 0
SUM OF ALL RESPONSES TO PHQ QUESTIONS 1-9: 0
9. THOUGHTS THAT YOU WOULD BE BETTER OFF DEAD, OR OF HURTING YOURSELF: NOT AT ALL
5. POOR APPETITE OR OVEREATING: NOT AT ALL
8. MOVING OR SPEAKING SO SLOWLY THAT OTHER PEOPLE COULD HAVE NOTICED. OR THE OPPOSITE, BEING SO FIGETY OR RESTLESS THAT YOU HAVE BEEN MOVING AROUND A LOT MORE THAN USUAL: NOT AT ALL
6. FEELING BAD ABOUT YOURSELF - OR THAT YOU ARE A FAILURE OR HAVE LET YOURSELF OR YOUR FAMILY DOWN: NOT AT ALL
4. FEELING TIRED OR HAVING LITTLE ENERGY: NOT AT ALL
1. LITTLE INTEREST OR PLEASURE IN DOING THINGS: NOT AT ALL
1. LITTLE INTEREST OR PLEASURE IN DOING THINGS: NOT AT ALL

## 2025-04-29 ENCOUNTER — OFFICE VISIT (OUTPATIENT)
Dept: INTERNAL MEDICINE | Age: 45
End: 2025-04-29
Payer: COMMERCIAL

## 2025-04-29 VITALS
DIASTOLIC BLOOD PRESSURE: 78 MMHG | WEIGHT: 169 LBS | BODY MASS INDEX: 33.18 KG/M2 | HEIGHT: 60 IN | HEART RATE: 74 BPM | SYSTOLIC BLOOD PRESSURE: 110 MMHG | OXYGEN SATURATION: 100 %

## 2025-04-29 DIAGNOSIS — Z85.841 HISTORY OF BRAIN CANCER: Primary | ICD-10-CM

## 2025-04-29 DIAGNOSIS — F34.1 DYSTHYMIA: ICD-10-CM

## 2025-04-29 DIAGNOSIS — C71.9 ANAPLASTIC ASTROCYTOMA (HCC): ICD-10-CM

## 2025-04-29 DIAGNOSIS — Z13.220 SCREENING, LIPID: ICD-10-CM

## 2025-04-29 DIAGNOSIS — H92.11 OTORRHEA OF RIGHT EAR: ICD-10-CM

## 2025-04-29 DIAGNOSIS — Z12.31 SCREENING MAMMOGRAM FOR BREAST CANCER: ICD-10-CM

## 2025-04-29 DIAGNOSIS — G40.909 SEIZURE DISORDER (HCC): ICD-10-CM

## 2025-04-29 DIAGNOSIS — L98.9 FOOT LESION: ICD-10-CM

## 2025-04-29 DIAGNOSIS — E55.9 VITAMIN D DEFICIENCY: ICD-10-CM

## 2025-04-29 DIAGNOSIS — Z13.1 SCREENING FOR DIABETES MELLITUS: ICD-10-CM

## 2025-04-29 PROCEDURE — 99214 OFFICE O/P EST MOD 30 MIN: CPT | Performed by: INTERNAL MEDICINE

## 2025-04-29 RX ORDER — MULTIVIT-MIN/IRON/FOLIC ACID/K 18-600-40
2000 CAPSULE ORAL DAILY
COMMUNITY

## 2025-04-29 NOTE — PROGRESS NOTES
Chief Complaint   Patient presents with    6 Month Follow-Up     Hx of seizure disorder       HPI:   History of Present Illness  The patient presents for evaluation of seizures, plantar wart, ear drainage, and heavy menstrual bleeding.    Seizures are managed with Trileptal and Topamax. She has been seizure-free since 04/2024, with only one episode after starting new medication. Driving is maintained without incidents. Annual visits and biannual MRIs with [facility] are scheduled unless a seizure occurs. 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    Persistent plantar wart causing discomfort for one year. Liquid treatment applied, but core not removed due to pain. Does not walk barefoot outdoors. Mole removed from foot by dermatologist several years ago.    Heavy menstrual cycles with clots reported monthly. Suspected chronic anemia.    Occasional ear drainage noted, follow-up in 6 months. Ear drops prescribed.    Colonoscopy last year showed no polyps, good for 5 years.    GYNECOLOGICAL HISTORY:  - Frequency and Flow: Monthly, heavy with clots    PAST SURGICAL HISTORY:  - Mole removal from foot    FAMILY HISTORY  Mother went through menopause at 48.       Past Medical History:   Diagnosis Date    Anaplastic glioma of brain (HCC) 02/2010    Anxiety 06/28/2017    Basal cell carcinoma     Dysthymia 06/28/2017    Ear pain     Seizure disorder (HCC) 06/28/2017       Past Surgical History:   Procedure Laterality Date    BREAST BIOPSY Left 2006    benign    BREAST BIOPSY Left 2010    benign    COLONOSCOPY N/A 05/10/2024    Dr LILLIANA Rider-Normal, 10 yr recall    CRANIOTOMY  2010    Right Frontal Lobe    MYRINGOTOMY Right 12/20/2024    Myringoplasty performed by Jabari Liu MD at [facility] OR    SKIN CANCER EXCISION      TYMPANOSTOMY TUBE PLACEMENT Right 12/20/2024    Balloon dilation performed by Jabari Liu MD at [facility] OR       Family History   Problem Relation Age of Onset

## 2025-05-05 ENCOUNTER — HOSPITAL ENCOUNTER (OUTPATIENT)
Dept: WOMENS IMAGING | Age: 45
Discharge: HOME | End: 2025-05-05
Attending: INTERNAL MEDICINE
Payer: COMMERCIAL

## 2025-05-05 VITALS — BODY MASS INDEX: 33.38 KG/M2 | HEIGHT: 60 IN | WEIGHT: 170 LBS

## 2025-05-05 DIAGNOSIS — Z12.31 SCREENING MAMMOGRAM FOR BREAST CANCER: ICD-10-CM

## 2025-05-05 PROCEDURE — 77063 BREAST TOMOSYNTHESIS BI: CPT

## 2025-05-07 ENCOUNTER — RESULTS FOLLOW-UP (OUTPATIENT)
Dept: INTERNAL MEDICINE | Age: 45
End: 2025-05-07

## 2025-05-07 PROBLEM — R21 RASH: Status: RESOLVED | Noted: 2023-03-23 | Resolved: 2025-05-07

## 2025-08-25 ENCOUNTER — TELEPHONE (OUTPATIENT)
Dept: NEUROLOGY | Age: 45
End: 2025-08-25

## 2025-08-26 RX ORDER — TOPIRAMATE 50 MG/1
50 TABLET, FILM COATED ORAL 2 TIMES DAILY
Qty: 60 TABLET | Refills: 3 | Status: SHIPPED | OUTPATIENT
Start: 2025-08-26

## 2025-09-02 ENCOUNTER — TELEPHONE (OUTPATIENT)
Dept: NEUROLOGY | Age: 45
End: 2025-09-02

## 2025-09-02 DIAGNOSIS — G40.919 INTRACTABLE SEIZURES (HCC): Primary | ICD-10-CM

## 2025-09-04 ENCOUNTER — OFFICE VISIT (OUTPATIENT)
Dept: ENT CLINIC | Age: 45
End: 2025-09-04
Payer: COMMERCIAL

## 2025-09-04 VITALS
HEIGHT: 60 IN | DIASTOLIC BLOOD PRESSURE: 78 MMHG | BODY MASS INDEX: 33.38 KG/M2 | WEIGHT: 170 LBS | SYSTOLIC BLOOD PRESSURE: 112 MMHG

## 2025-09-04 DIAGNOSIS — H66.014 RECURRENT ACUTE SUPPURATIVE OTITIS MEDIA OF RIGHT EAR WITH SPONTANEOUS RUPTURE OF TYMPANIC MEMBRANE: Primary | ICD-10-CM

## 2025-09-04 PROCEDURE — 99213 OFFICE O/P EST LOW 20 MIN: CPT | Performed by: PHYSICIAN ASSISTANT

## 2025-09-04 RX ORDER — NEOMYCIN SULFATE, POLYMYXIN B SULFATE AND HYDROCORTISONE 3.5; 10000; 1 MG/ML; [IU]/ML; MG/ML
4 SOLUTION AURICULAR (OTIC) 3 TIMES DAILY
Qty: 10 ML | Refills: 0 | Status: SHIPPED | OUTPATIENT
Start: 2025-09-04 | End: 2025-09-14

## 2025-09-05 RX ORDER — CLONAZEPAM 0.5 MG/1
0.5 TABLET ORAL 2 TIMES DAILY
Qty: 60 TABLET | Refills: 0 | Status: SHIPPED | OUTPATIENT
Start: 2025-09-05 | End: 2025-10-05

## (undated) DEVICE — BLADE 45DEG EAR UNITOM SPEAR TIP NAR

## (undated) DEVICE — SYSTEM  EUSTACHIAN TUBE DILATION

## (undated) DEVICE — SENSOR OXMTR PED /INFANT L1FT ADH WRP DISP TRUSIGNAL

## (undated) DEVICE — GAUZE,SPONGE,4"X4",8PLY,STRL,LF,10/TRAY: Brand: MEDLINE

## (undated) DEVICE — SURGICAL SUCTION CONNECTING TUBE WITH MALE CONNECTOR AND SUCTION CLAMP, 2 FT. LONG (.6 M), 5 MM I.D.: Brand: CONMED

## (undated) DEVICE — PITCHER PT 1200ML W HNDL CSR WRP

## (undated) DEVICE — SPONGE,NEURO,1"X3",XR,STRL,LF,10/PK: Brand: MEDLINE

## (undated) DEVICE — DEFOGGER!" ANTI FOG KIT: Brand: DEROYAL

## (undated) DEVICE — ABSORBENT COTTON BALLS: Brand: DEROYAL

## (undated) DEVICE — TOWEL,OR,DSP,ST,BLUE,DLX,4/PK,20PK/CS: Brand: MEDLINE

## (undated) DEVICE — DUAL LUMEN STOMACH TUBE: Brand: SALEM SUMP

## (undated) DEVICE — GLOVE SURG SZ 7 CRM LTX FREE POLYISOPRENE POLYMER BEAD ANTI

## (undated) DEVICE — SPONGE,NEURO,0.5"X3",XR,STRL,LF,10/PK: Brand: MEDLINE

## (undated) DEVICE — ENDO KIT,LOURDES HOSPITAL: Brand: MEDLINE INDUSTRIES, INC.

## (undated) DEVICE — TUBING, SUCTION, 1/4" X 20', STRAIGHT: Brand: MEDLINE INDUSTRIES, INC.

## (undated) DEVICE — SYRINGE 20ML LL S/C 50

## (undated) DEVICE — CIRCUIT BRTH PED L108IN 1L BACT AND VIR FLTR PARA WYE 2 LIMB

## (undated) DEVICE — COVER,MAYO STAND,STERILE: Brand: MEDLINE

## (undated) DEVICE — MASK ANES CHILD SM SZ 3 SUP ERGO RND STYL FLX EC ULT THN